# Patient Record
Sex: FEMALE | Race: OTHER | HISPANIC OR LATINO | URBAN - METROPOLITAN AREA
[De-identification: names, ages, dates, MRNs, and addresses within clinical notes are randomized per-mention and may not be internally consistent; named-entity substitution may affect disease eponyms.]

---

## 2020-03-11 ENCOUNTER — EMERGENCY (EMERGENCY)
Facility: HOSPITAL | Age: 50
LOS: 1 days | Discharge: HOME | End: 2020-03-11
Attending: EMERGENCY MEDICINE
Payer: SELF-PAY

## 2020-03-11 VITALS
HEART RATE: 81 BPM | DIASTOLIC BLOOD PRESSURE: 82 MMHG | HEIGHT: 66.54 IN | RESPIRATION RATE: 17 BRPM | TEMPERATURE: 98 F | SYSTOLIC BLOOD PRESSURE: 132 MMHG | OXYGEN SATURATION: 98 % | WEIGHT: 240.08 LBS

## 2020-03-11 LAB
APPEARANCE UR: CLEAR — SIGNIFICANT CHANGE UP
BACTERIA # UR AUTO: NEGATIVE — SIGNIFICANT CHANGE UP
BILIRUB UR-MCNC: NEGATIVE — SIGNIFICANT CHANGE UP
COLOR SPEC: YELLOW — SIGNIFICANT CHANGE UP
DIFF PNL FLD: ABNORMAL
EPI CELLS # UR: SIGNIFICANT CHANGE UP
GLUCOSE UR QL: 50 MG/DL
KETONES UR-MCNC: ABNORMAL
LEUKOCYTE ESTERASE UR-ACNC: NEGATIVE — SIGNIFICANT CHANGE UP
NITRITE UR-MCNC: NEGATIVE — SIGNIFICANT CHANGE UP
PH UR: 5 — SIGNIFICANT CHANGE UP (ref 5–8)
PROT UR-MCNC: 30 MG/DL
RBC CASTS # UR COMP ASSIST: ABNORMAL /HPF (ref 0–4)
SP GR SPEC: 1.03 — HIGH (ref 1.01–1.02)
UROBILINOGEN FLD QL: NEGATIVE MG/DL — SIGNIFICANT CHANGE UP
WBC UR QL: SIGNIFICANT CHANGE UP

## 2020-03-11 PROCEDURE — 99283 EMERGENCY DEPT VISIT LOW MDM: CPT | Mod: 25

## 2020-03-11 PROCEDURE — T1013: CPT

## 2020-03-11 PROCEDURE — 87086 URINE CULTURE/COLONY COUNT: CPT

## 2020-03-11 PROCEDURE — 71046 X-RAY EXAM CHEST 2 VIEWS: CPT

## 2020-03-11 PROCEDURE — 99284 EMERGENCY DEPT VISIT MOD MDM: CPT

## 2020-03-11 PROCEDURE — 81001 URINALYSIS AUTO W/SCOPE: CPT

## 2020-03-11 PROCEDURE — 71046 X-RAY EXAM CHEST 2 VIEWS: CPT | Mod: 26

## 2020-03-11 RX ORDER — METHOCARBAMOL 500 MG/1
1000 TABLET, FILM COATED ORAL ONCE
Refills: 0 | Status: COMPLETED | OUTPATIENT
Start: 2020-03-11 | End: 2020-03-11

## 2020-03-11 RX ORDER — AMOXICILLIN 250 MG/5ML
1 SUSPENSION, RECONSTITUTED, ORAL (ML) ORAL
Qty: 14 | Refills: 0
Start: 2020-03-11 | End: 2020-03-17

## 2020-03-11 RX ORDER — IBUPROFEN 200 MG
1 TABLET ORAL
Qty: 20 | Refills: 0
Start: 2020-03-11 | End: 2020-03-15

## 2020-03-11 RX ADMIN — METHOCARBAMOL 1000 MILLIGRAM(S): 500 TABLET, FILM COATED ORAL at 13:42

## 2020-03-11 NOTE — ED PROVIDER NOTE - ADDITIONAL NOTES AND INSTRUCTIONS:
PT was evaluated At [facility] ED and was found to have a condition that warranted time of to rest and heal from WORK/SCHOOL.   Juan Carlos Lemus PA-C

## 2020-03-11 NOTE — ED PROVIDER NOTE - CARE PROVIDER_API CALL
Hero Montoya)  Otolaryngology  [address] [place] [address], [place] [address]  Phone: [phone]  Fax: [phone]  Follow Up Time:

## 2020-03-11 NOTE — ED PROVIDER NOTE - ATTENDING CONTRIBUTION TO CARE
Danuta: I performed a face to face bedside interview with patient regarding history of present illness, review of symptoms and past medical history. I completed an independent physical exam.  I have discussed patient's plan of care with advanced care provider.   I agree with note as stated above including HISTORY OF PRESENT ILLNESS, HIV, PAST MEDICAL/SURGICAL/FAMILY/SOCIAL HISTORY, ALLERGIES AND HOME MEDICATIONS, REVIEW OF SYSTEMS, PHYSICAL EXAM, MEDICAL DECISION MAKING and any PROGRESS NOTES during the time I functioned as the attending physician for this patient  unless otherwise noted. My brief assessment is as follows: 50F h/o HTN, HLD p/w myalgias, fever. Debies travel, sick contacts, nausea, vomiting.   Gen: Well appearing in NAD  Head: NC/AT, R TM erythematous/bulging, no mastoid ttp.   Neck: trachea midline  Resp:  No distress, CTAB  Ext: no deformities  Neuro:  A&O appears non focal  Skin:  Warm and dry as visualized  Psych:  Normal affect and mood  UA and CXR WNL, will treat for otitis media.

## 2020-03-11 NOTE — ED ADULT TRIAGE NOTE - CHIEF COMPLAINT QUOTE
Patient reports 4-5 days of cough, fever and body aches. Pt states that she travels a lot on the train

## 2020-03-11 NOTE — ED PROVIDER NOTE - OBJECTIVE STATEMENT
PT with no SPMHx presents to the ED with complaint of body achess, fevers, SOB, x1wk. PT staets that she has been having mild non raditing symptioms over the last few PT with no SPMHx presents to the ED with complaint of body aches, fevers, x1wk. PT states that she has been having mild diffuse symptoms over the wk. PT states that symptoms started gradually have been consistent since onset, have not been made better or worse with anything. PT states that she has not been exposed to any sick contacts. PT dines cough, sob, diff breathing, weakness, numbness, tingling, rash, n/v.

## 2020-03-11 NOTE — ED PROVIDER NOTE - PATIENT PORTAL LINK FT
You can access the FollowMyHealth Patient Portal offered by [facility] by registering at the following website: [email or web address]. By joining [employer]’s FollowMyHealth portal, you will also be able to view your health information using other applications (apps) compatible with our system.

## 2020-03-12 LAB
CULTURE RESULTS: SIGNIFICANT CHANGE UP
SPECIMEN SOURCE: SIGNIFICANT CHANGE UP

## 2020-03-17 ENCOUNTER — EMERGENCY (EMERGENCY)
Facility: HOSPITAL | Age: 50
LOS: 1 days | Discharge: HOME | End: 2020-03-17
Attending: EMERGENCY MEDICINE
Payer: SELF-PAY

## 2020-03-17 VITALS
DIASTOLIC BLOOD PRESSURE: 81 MMHG | HEART RATE: 93 BPM | HEIGHT: 66.54 IN | OXYGEN SATURATION: 97 % | SYSTOLIC BLOOD PRESSURE: 125 MMHG | RESPIRATION RATE: 16 BRPM | TEMPERATURE: 98 F | WEIGHT: 227.08 LBS

## 2020-03-17 LAB
ALBUMIN SERPL ELPH-MCNC: 3.8 G/DL — SIGNIFICANT CHANGE UP (ref 3.3–5.2)
ALP SERPL-CCNC: 147 U/L — HIGH (ref 40–120)
ALT FLD-CCNC: 18 U/L — SIGNIFICANT CHANGE UP
ANION GAP SERPL CALC-SCNC: 11 MMOL/L — SIGNIFICANT CHANGE UP (ref 5–17)
AST SERPL-CCNC: 18 U/L — SIGNIFICANT CHANGE UP
BASOPHILS # BLD AUTO: 0.05 K/UL — SIGNIFICANT CHANGE UP (ref 0–0.2)
BASOPHILS NFR BLD AUTO: 0.5 % — SIGNIFICANT CHANGE UP (ref 0–2)
BILIRUB SERPL-MCNC: <0.2 MG/DL — LOW (ref 0.4–2)
BUN SERPL-MCNC: 20 MG/DL — SIGNIFICANT CHANGE UP (ref 8–20)
CALCIUM SERPL-MCNC: 8.9 MG/DL — SIGNIFICANT CHANGE UP (ref 8.6–10.2)
CHLORIDE SERPL-SCNC: 104 MMOL/L — SIGNIFICANT CHANGE UP (ref 98–107)
CO2 SERPL-SCNC: 24 MMOL/L — SIGNIFICANT CHANGE UP (ref 22–29)
CREAT SERPL-MCNC: 0.59 MG/DL — SIGNIFICANT CHANGE UP (ref 0.5–1.3)
EOSINOPHIL # BLD AUTO: 0.16 K/UL — SIGNIFICANT CHANGE UP (ref 0–0.5)
EOSINOPHIL NFR BLD AUTO: 1.5 % — SIGNIFICANT CHANGE UP (ref 0–6)
GLUCOSE SERPL-MCNC: 127 MG/DL — HIGH (ref 70–99)
HCG SERPL-ACNC: <4 MIU/ML — SIGNIFICANT CHANGE UP
HCT VFR BLD CALC: 40.6 % — SIGNIFICANT CHANGE UP (ref 34.5–45)
HGB BLD-MCNC: 12.9 G/DL — SIGNIFICANT CHANGE UP (ref 11.5–15.5)
IMM GRANULOCYTES NFR BLD AUTO: 0.5 % — SIGNIFICANT CHANGE UP (ref 0–1.5)
LYMPHOCYTES # BLD AUTO: 2.86 K/UL — SIGNIFICANT CHANGE UP (ref 1–3.3)
LYMPHOCYTES # BLD AUTO: 26 % — SIGNIFICANT CHANGE UP (ref 13–44)
MCHC RBC-ENTMCNC: 29.9 PG — SIGNIFICANT CHANGE UP (ref 27–34)
MCHC RBC-ENTMCNC: 31.8 GM/DL — LOW (ref 32–36)
MCV RBC AUTO: 94 FL — SIGNIFICANT CHANGE UP (ref 80–100)
MONOCYTES # BLD AUTO: 0.96 K/UL — HIGH (ref 0–0.9)
MONOCYTES NFR BLD AUTO: 8.7 % — SIGNIFICANT CHANGE UP (ref 2–14)
NEUTROPHILS # BLD AUTO: 6.91 K/UL — SIGNIFICANT CHANGE UP (ref 1.8–7.4)
NEUTROPHILS NFR BLD AUTO: 62.8 % — SIGNIFICANT CHANGE UP (ref 43–77)
PLATELET # BLD AUTO: 280 K/UL — SIGNIFICANT CHANGE UP (ref 150–400)
POTASSIUM SERPL-MCNC: 4.2 MMOL/L — SIGNIFICANT CHANGE UP (ref 3.5–5.3)
POTASSIUM SERPL-SCNC: 4.2 MMOL/L — SIGNIFICANT CHANGE UP (ref 3.5–5.3)
PROT SERPL-MCNC: 7.2 G/DL — SIGNIFICANT CHANGE UP (ref 6.6–8.7)
RAPID RVP RESULT: DETECTED
RBC # BLD: 4.32 M/UL — SIGNIFICANT CHANGE UP (ref 3.8–5.2)
RBC # FLD: 12.6 % — SIGNIFICANT CHANGE UP (ref 10.3–14.5)
RV+EV RNA SPEC QL NAA+PROBE: DETECTED
SODIUM SERPL-SCNC: 139 MMOL/L — SIGNIFICANT CHANGE UP (ref 135–145)
WBC # BLD: 10.99 K/UL — HIGH (ref 3.8–10.5)
WBC # FLD AUTO: 10.99 K/UL — HIGH (ref 3.8–10.5)

## 2020-03-17 PROCEDURE — 71045 X-RAY EXAM CHEST 1 VIEW: CPT | Mod: 26

## 2020-03-17 PROCEDURE — 87486 CHLMYD PNEUM DNA AMP PROBE: CPT

## 2020-03-17 PROCEDURE — 85027 COMPLETE CBC AUTOMATED: CPT

## 2020-03-17 PROCEDURE — 36415 COLL VENOUS BLD VENIPUNCTURE: CPT

## 2020-03-17 PROCEDURE — 87581 M.PNEUMON DNA AMP PROBE: CPT

## 2020-03-17 PROCEDURE — 94640 AIRWAY INHALATION TREATMENT: CPT

## 2020-03-17 PROCEDURE — 84702 CHORIONIC GONADOTROPIN TEST: CPT

## 2020-03-17 PROCEDURE — 71045 X-RAY EXAM CHEST 1 VIEW: CPT

## 2020-03-17 PROCEDURE — 99284 EMERGENCY DEPT VISIT MOD MDM: CPT | Mod: 25

## 2020-03-17 PROCEDURE — 96374 THER/PROPH/DIAG INJ IV PUSH: CPT

## 2020-03-17 PROCEDURE — 96375 TX/PRO/DX INJ NEW DRUG ADDON: CPT

## 2020-03-17 PROCEDURE — 80053 COMPREHEN METABOLIC PANEL: CPT

## 2020-03-17 PROCEDURE — 99284 EMERGENCY DEPT VISIT MOD MDM: CPT

## 2020-03-17 PROCEDURE — 87798 DETECT AGENT NOS DNA AMP: CPT

## 2020-03-17 PROCEDURE — 87633 RESP VIRUS 12-25 TARGETS: CPT

## 2020-03-17 RX ORDER — KETOROLAC TROMETHAMINE 30 MG/ML
15 SYRINGE (ML) INJECTION ONCE
Refills: 0 | Status: DISCONTINUED | OUTPATIENT
Start: 2020-03-17 | End: 2020-03-17

## 2020-03-17 RX ORDER — AZITHROMYCIN 500 MG/1
500 TABLET, FILM COATED ORAL ONCE
Refills: 0 | Status: COMPLETED | OUTPATIENT
Start: 2020-03-17 | End: 2020-03-17

## 2020-03-17 RX ORDER — ALBUTEROL 90 UG/1
2 AEROSOL, METERED ORAL ONCE
Refills: 0 | Status: COMPLETED | OUTPATIENT
Start: 2020-03-17 | End: 2020-03-17

## 2020-03-17 RX ORDER — CEFTRIAXONE 500 MG/1
1000 INJECTION, POWDER, FOR SOLUTION INTRAMUSCULAR; INTRAVENOUS ONCE
Refills: 0 | Status: COMPLETED | OUTPATIENT
Start: 2020-03-17 | End: 2020-03-17

## 2020-03-17 RX ORDER — SODIUM CHLORIDE 9 MG/ML
500 INJECTION INTRAMUSCULAR; INTRAVENOUS; SUBCUTANEOUS ONCE
Refills: 0 | Status: COMPLETED | OUTPATIENT
Start: 2020-03-17 | End: 2020-03-17

## 2020-03-17 RX ADMIN — Medication 15 MILLIGRAM(S): at 22:52

## 2020-03-17 RX ADMIN — ALBUTEROL 2 PUFF(S): 90 AEROSOL, METERED ORAL at 20:04

## 2020-03-17 RX ADMIN — AZITHROMYCIN 255 MILLIGRAM(S): 500 TABLET, FILM COATED ORAL at 20:52

## 2020-03-17 RX ADMIN — CEFTRIAXONE 100 MILLIGRAM(S): 500 INJECTION, POWDER, FOR SOLUTION INTRAMUSCULAR; INTRAVENOUS at 20:04

## 2020-03-17 RX ADMIN — SODIUM CHLORIDE 500 MILLILITER(S): 9 INJECTION INTRAMUSCULAR; INTRAVENOUS; SUBCUTANEOUS at 20:04

## 2020-03-17 NOTE — ED PROVIDER NOTE - PATIENT PORTAL LINK FT
You can access the FollowMyHealth Patient Portal offered by [facility] by registering at the following website: [email or web address]. By joining [employer]’s FollowMyHealth portal, you will also be able to view your health information using other applications (apps) compatible with our system.

## 2020-03-17 NOTE — ED PROVIDER NOTE - NSFOLLOWUPINSTRUCTIONS_ED_ALL_ED_FT
POR FAVOR SAMUEL WERO ALEXA CON CHOWDARY MEDICO PRIMARIO. PUEDE UTILIZAR 2 ROCIOS DE ALBUTEROL CADA 4 HORAS PARA LA TOZ O FALTA DE AIRE. SI SE SIENTE PEOR, REGRESE A LA KATE DE EMERGENCIAS EN SEGUIDA.  TRATE DE EVITAR LUGARES PUBLICOS Y OTRAS PERSONAS HASTA QUE SE SIENTE MEJOR

## 2020-03-17 NOTE — ED PROVIDER NOTE - OBJECTIVE STATEMENT
50yoF with HTN, HLD, c/o fever, cough, body aches since last Tuesday; reports productive cough; now c/o some SOB even at rest.  Reports she completed a course of antibiotics given to her on last ED visit but she feels worse now.  C/o L flank area pain.  Denies travel but reports she commutes on the train to [place] daily.  vaccinated via flu. Took ibuprofen last time this morning.  Finished course of antibiotics tomorrow.

## 2020-03-17 NOTE — ED ADULT NURSE NOTE - OBJECTIVE STATEMENT
Assumed pt care at this time. Pt resting comfortably in stretcher, A&Ox3, NAD noted, respirations even and nonlabored. Pt c/o of SOB and cough. Pt is r/o covid-19.

## 2020-03-17 NOTE — ED ADULT TRIAGE NOTE - CHIEF COMPLAINT QUOTE
50, F, nad, sent by [facility] for worsening PNA, sob and fever. Pt reports antibiotics being taken and making her worse. Per charge RN pt to wait in subwaiting A for MAIN [facility] bed to be free.

## 2020-03-17 NOTE — ED ADULT NURSE NOTE - CHIEF COMPLAINT QUOTE
50, F, nad, sent by [facility] for worsening PNA, sob and fever. Pt reports antibiotics being taken and making her worse. Per charge RN pt to wait in subwaiting A for MAIN [facility] bed to be free.

## 2020-03-17 NOTE — ED PROVIDER NOTE - PROGRESS NOTE DETAILS
Dyspnea improved. Pt pending RVP results/ Dyspnea improved after bronchodilator. Pt pending RVP results but well enough to be discharged so patient will leave prior to results. Instructed to exercise supportive care/ social isolation/ self quarantine, and return if worse.

## 2020-03-18 PROBLEM — I10 ESSENTIAL (PRIMARY) HYPERTENSION: Chronic | Status: ACTIVE | Noted: 2020-03-11

## 2020-03-18 PROBLEM — E78.5 HYPERLIPIDEMIA, UNSPECIFIED: Chronic | Status: ACTIVE | Noted: 2020-03-11

## 2020-06-11 NOTE — ED PROVIDER NOTE - RESPIRATORY DISTRESS
no Left UE Active Assistive ROM was WFL  (within functional limits)/Right UE Active ROM was WFL (within functional limits)

## 2020-06-25 ENCOUNTER — INPATIENT (INPATIENT)
Facility: HOSPITAL | Age: 50
LOS: 0 days | Discharge: HOME | DRG: 93 | End: 2020-06-26
Attending: HOSPITALIST | Admitting: HOSPITALIST
Payer: MEDICAID

## 2020-06-25 VITALS
TEMPERATURE: 98 F | SYSTOLIC BLOOD PRESSURE: 197 MMHG | HEIGHT: 63 IN | HEART RATE: 98 BPM | WEIGHT: 199.96 LBS | RESPIRATION RATE: 22 BRPM | OXYGEN SATURATION: 97 % | DIASTOLIC BLOOD PRESSURE: 110 MMHG

## 2020-06-25 DIAGNOSIS — I63.9 CEREBRAL INFARCTION, UNSPECIFIED: ICD-10-CM

## 2020-06-25 LAB
A1C WITH ESTIMATED AVERAGE GLUCOSE RESULT: 7.7 % — HIGH (ref 4–5.6)
ALBUMIN SERPL ELPH-MCNC: 3.7 G/DL — SIGNIFICANT CHANGE UP (ref 3.3–5.2)
ALP SERPL-CCNC: 146 U/L — HIGH (ref 40–120)
ALT FLD-CCNC: 22 U/L — SIGNIFICANT CHANGE UP
ANION GAP SERPL CALC-SCNC: 11 MMOL/L — SIGNIFICANT CHANGE UP (ref 5–17)
AST SERPL-CCNC: 15 U/L — SIGNIFICANT CHANGE UP
BILIRUB SERPL-MCNC: <0.2 MG/DL — LOW (ref 0.4–2)
BUN SERPL-MCNC: 14 MG/DL — SIGNIFICANT CHANGE UP (ref 8–20)
CALCIUM SERPL-MCNC: 8.9 MG/DL — SIGNIFICANT CHANGE UP (ref 8.6–10.2)
CHLORIDE SERPL-SCNC: 103 MMOL/L — SIGNIFICANT CHANGE UP (ref 98–107)
CO2 SERPL-SCNC: 26 MMOL/L — SIGNIFICANT CHANGE UP (ref 22–29)
CREAT SERPL-MCNC: 0.48 MG/DL — LOW (ref 0.5–1.3)
D DIMER BLD IA.RAPID-MCNC: <150 NG/ML DDU — SIGNIFICANT CHANGE UP
ESTIMATED AVERAGE GLUCOSE: 174 MG/DL — HIGH (ref 68–114)
GLUCOSE BLDC GLUCOMTR-MCNC: 121 MG/DL — HIGH (ref 70–99)
GLUCOSE BLDC GLUCOMTR-MCNC: 201 MG/DL — HIGH (ref 70–99)
GLUCOSE SERPL-MCNC: 212 MG/DL — HIGH (ref 70–99)
HCT VFR BLD CALC: 39.5 % — SIGNIFICANT CHANGE UP (ref 34.5–45)
HGB BLD-MCNC: 12.6 G/DL — SIGNIFICANT CHANGE UP (ref 11.5–15.5)
MCHC RBC-ENTMCNC: 29.3 PG — SIGNIFICANT CHANGE UP (ref 27–34)
MCHC RBC-ENTMCNC: 31.9 GM/DL — LOW (ref 32–36)
MCV RBC AUTO: 91.9 FL — SIGNIFICANT CHANGE UP (ref 80–100)
NT-PROBNP SERPL-SCNC: 30 PG/ML — SIGNIFICANT CHANGE UP (ref 0–300)
PLATELET # BLD AUTO: 264 K/UL — SIGNIFICANT CHANGE UP (ref 150–400)
POTASSIUM SERPL-MCNC: 4 MMOL/L — SIGNIFICANT CHANGE UP (ref 3.5–5.3)
POTASSIUM SERPL-SCNC: 4 MMOL/L — SIGNIFICANT CHANGE UP (ref 3.5–5.3)
PROT SERPL-MCNC: 6.8 G/DL — SIGNIFICANT CHANGE UP (ref 6.6–8.7)
RBC # BLD: 4.3 M/UL — SIGNIFICANT CHANGE UP (ref 3.8–5.2)
RBC # FLD: 12.6 % — SIGNIFICANT CHANGE UP (ref 10.3–14.5)
SODIUM SERPL-SCNC: 140 MMOL/L — SIGNIFICANT CHANGE UP (ref 135–145)
TROPONIN T SERPL-MCNC: <0.01 NG/ML — SIGNIFICANT CHANGE UP (ref 0–0.06)
TROPONIN T SERPL-MCNC: <0.01 NG/ML — SIGNIFICANT CHANGE UP (ref 0–0.06)
TSH SERPL-MCNC: 0.85 UIU/ML — SIGNIFICANT CHANGE UP (ref 0.27–4.2)
WBC # BLD: 8.91 K/UL — SIGNIFICANT CHANGE UP (ref 3.8–10.5)
WBC # FLD AUTO: 8.91 K/UL — SIGNIFICANT CHANGE UP (ref 3.8–10.5)

## 2020-06-25 PROCEDURE — 70498 CT ANGIOGRAPHY NECK: CPT | Mod: 26

## 2020-06-25 PROCEDURE — 93010 ELECTROCARDIOGRAM REPORT: CPT

## 2020-06-25 PROCEDURE — 70450 CT HEAD/BRAIN W/O DYE: CPT | Mod: 26,59

## 2020-06-25 PROCEDURE — 99285 EMERGENCY DEPT VISIT HI MDM: CPT

## 2020-06-25 PROCEDURE — 70496 CT ANGIOGRAPHY HEAD: CPT | Mod: 26

## 2020-06-25 PROCEDURE — 71045 X-RAY EXAM CHEST 1 VIEW: CPT | Mod: 26

## 2020-06-25 PROCEDURE — 99223 1ST HOSP IP/OBS HIGH 75: CPT

## 2020-06-25 PROCEDURE — 72125 CT NECK SPINE W/O DYE: CPT | Mod: 26

## 2020-06-25 RX ORDER — HEPARIN SODIUM 5000 [USP'U]/ML
5000 INJECTION INTRAVENOUS; SUBCUTANEOUS EVERY 8 HOURS
Refills: 0 | Status: DISCONTINUED | OUTPATIENT
Start: 2020-06-25 | End: 2020-06-26

## 2020-06-25 RX ORDER — ASPIRIN/CALCIUM CARB/MAGNESIUM 324 MG
325 TABLET ORAL DAILY
Refills: 0 | Status: DISCONTINUED | OUTPATIENT
Start: 2020-06-25 | End: 2020-06-26

## 2020-06-25 RX ORDER — DEXTROSE 50 % IN WATER 50 %
25 SYRINGE (ML) INTRAVENOUS ONCE
Refills: 0 | Status: DISCONTINUED | OUTPATIENT
Start: 2020-06-25 | End: 2020-06-26

## 2020-06-25 RX ORDER — VENLAFAXINE HCL 75 MG
75 CAPSULE, EXT RELEASE 24 HR ORAL DAILY
Refills: 0 | Status: DISCONTINUED | OUTPATIENT
Start: 2020-06-25 | End: 2020-06-26

## 2020-06-25 RX ORDER — DEXTROSE 50 % IN WATER 50 %
15 SYRINGE (ML) INTRAVENOUS ONCE
Refills: 0 | Status: DISCONTINUED | OUTPATIENT
Start: 2020-06-25 | End: 2020-06-26

## 2020-06-25 RX ORDER — DEXTROSE 50 % IN WATER 50 %
12.5 SYRINGE (ML) INTRAVENOUS ONCE
Refills: 0 | Status: DISCONTINUED | OUTPATIENT
Start: 2020-06-25 | End: 2020-06-26

## 2020-06-25 RX ORDER — SODIUM CHLORIDE 9 MG/ML
1000 INJECTION, SOLUTION INTRAVENOUS
Refills: 0 | Status: DISCONTINUED | OUTPATIENT
Start: 2020-06-25 | End: 2020-06-26

## 2020-06-25 RX ORDER — GLUCAGON INJECTION, SOLUTION 0.5 MG/.1ML
1 INJECTION, SOLUTION SUBCUTANEOUS ONCE
Refills: 0 | Status: DISCONTINUED | OUTPATIENT
Start: 2020-06-25 | End: 2020-06-26

## 2020-06-25 RX ORDER — INSULIN LISPRO 100/ML
VIAL (ML) SUBCUTANEOUS
Refills: 0 | Status: DISCONTINUED | OUTPATIENT
Start: 2020-06-25 | End: 2020-06-26

## 2020-06-25 RX ORDER — NITROGLYCERIN 6.5 MG
0.4 CAPSULE, EXTENDED RELEASE ORAL
Refills: 0 | Status: DISCONTINUED | OUTPATIENT
Start: 2020-06-25 | End: 2020-06-26

## 2020-06-25 RX ORDER — ASPIRIN/CALCIUM CARB/MAGNESIUM 324 MG
162 TABLET ORAL DAILY
Refills: 0 | Status: DISCONTINUED | OUTPATIENT
Start: 2020-06-25 | End: 2020-06-25

## 2020-06-25 RX ORDER — FAMOTIDINE 10 MG/ML
20 INJECTION INTRAVENOUS
Refills: 0 | Status: DISCONTINUED | OUTPATIENT
Start: 2020-06-25 | End: 2020-06-26

## 2020-06-25 RX ORDER — MORPHINE SULFATE 50 MG/1
2 CAPSULE, EXTENDED RELEASE ORAL EVERY 4 HOURS
Refills: 0 | Status: DISCONTINUED | OUTPATIENT
Start: 2020-06-25 | End: 2020-06-25

## 2020-06-25 RX ORDER — ATORVASTATIN CALCIUM 80 MG/1
20 TABLET, FILM COATED ORAL AT BEDTIME
Refills: 0 | Status: DISCONTINUED | OUTPATIENT
Start: 2020-06-25 | End: 2020-06-26

## 2020-06-25 RX ORDER — MORPHINE SULFATE 50 MG/1
4 CAPSULE, EXTENDED RELEASE ORAL ONCE
Refills: 0 | Status: DISCONTINUED | OUTPATIENT
Start: 2020-06-25 | End: 2020-06-25

## 2020-06-25 RX ADMIN — ATORVASTATIN CALCIUM 20 MILLIGRAM(S): 80 TABLET, FILM COATED ORAL at 21:56

## 2020-06-25 RX ADMIN — Medication 162 MILLIGRAM(S): at 11:25

## 2020-06-25 RX ADMIN — HEPARIN SODIUM 5000 UNIT(S): 5000 INJECTION INTRAVENOUS; SUBCUTANEOUS at 21:56

## 2020-06-25 NOTE — PHYSICAL THERAPY INITIAL EVALUATION ADULT - DISCHARGE DISPOSITION, PT EVAL
home w/ home PT/home w/ assist/home with assist, RW and home PT pending progress and pending continued stair training

## 2020-06-25 NOTE — PHYSICAL THERAPY INITIAL EVALUATION ADULT - PHYSICAL ASSIST/NONPHYSICAL ASSIST: STAIR NEGOTIATION, REHAB EVAL
nonverbal cues (demo/gestures)/1 person assist/verbal cues/pt required increased physical assistance to help maintain proper upright walking posture and for safety + falls prevention during stair negotiation; verbal cues for proper stair sequence; provided demonstration re proper use of RW during stair negotiation with repeat demonstration provided by pt

## 2020-06-25 NOTE — PHYSICAL THERAPY INITIAL EVALUATION ADULT - PHYSICAL ASSIST/NONPHYSICAL ASSIST: STAND/SIT, REHAB EVAL
pt required increased physical assistance to help perform transfer/to safely lower to a sitting position; verbal cues re proper sequence + proper hand placement in prep to perform transfer/verbal cues/1 person assist

## 2020-06-25 NOTE — ED ADULT TRIAGE NOTE - CHIEF COMPLAINT QUOTE
pt came to ED c/o left side chest heaviness and SOB that began this AM. Pt states she also has had left arm tingling x 2 days. Pt denies any cardiac history. Ambulatory in ED. No other symptoms noted. Pt tearful in triage

## 2020-06-25 NOTE — ED PROVIDER NOTE - OBJECTIVE STATEMENT
51 yo female pmh htn, hld comes to ed wit 3 days history of numbness and pain of left upper extremity; pt reports chest pain this morning; pt denies fever, chills, exposure to sick contacts;

## 2020-06-25 NOTE — PHYSICAL THERAPY INITIAL EVALUATION ADULT - GENERAL OBSERVATIONS, REHAB EVAL
Pt received in [facility], [facility], WOLFGANG chavez'ed pt for PT. pt [place]ian speaking, treating PT spoke Mal[place]ian. Pt observed semi-ontiveros in bed with telemontior with , darcy hemphill, A&O and c/o 8/10 headache

## 2020-06-25 NOTE — H&P ADULT - NEUROLOGICAL DETAILS
responds to verbal commands/alert and oriented x 3/responds to pain/focal deficit/strength decreased

## 2020-06-25 NOTE — CONSULT NOTE ADULT - SUBJECTIVE AND OBJECTIVE BOX
CHIEF COMPLAINT: chest pressure    HPI: 50yFemale    · Chief Complaint: The patient is a 50y Female complaining of chest pressure.	  · HPI Objective Statement: 51 yo female pmh htn, hld comes to ed wit 3 days history of numbness and pain of left upper extremity; pt reports chest pain this morning; pt denies fever, chills, exposure to sick contacts;	  · Presenting Symptoms: DIZZINESS	      PAST MEDICAL & SURGICAL HISTORY:  HLD (hyperlipidemia)  HTN (hypertension)  No significant past surgical history    MEDICATIONS  (STANDING):  aspirin enteric coated 325 milliGRAM(s) Oral daily  atorvastatin 20 milliGRAM(s) Oral at bedtime  dextrose 5%. 1000 milliLiter(s) (50 mL/Hr) IV Continuous <Continuous>  dextrose 50% Injectable 12.5 Gram(s) IV Push once  dextrose 50% Injectable 25 Gram(s) IV Push once  dextrose 50% Injectable 25 Gram(s) IV Push once  famotidine    Tablet 20 milliGRAM(s) Oral two times a day  heparin   Injectable 5000 Unit(s) SubCutaneous every 8 hours  insulin lispro (HumaLOG) corrective regimen sliding scale   SubCutaneous three times a day before meals  venlafaxine 75 milliGRAM(s) Oral daily    MEDICATIONS  (PRN):  dextrose 40% Gel 15 Gram(s) Oral once PRN Blood Glucose LESS THAN 70 milliGRAM(s)/deciliter  glucagon  Injectable 1 milliGRAM(s) IntraMuscular once PRN Glucose LESS THAN 70 milligrams/deciliter  nitroglycerin     SubLingual 0.4 milliGRAM(s) SubLingual every 5 minutes PRN Chest Pain    Allergies    No Known Allergies    Intolerances        FAMILY HISTORY:          SOCIAL HISTORY:    Tobacco:  no  Alcohol:  no  Drugs:  no        REVIEW OF SYSTEMS:    Relevant systems are negative except as noted in the chart, HPI, and PMH      VITAL SIGNS:  Vital Signs Last 24 Hrs  T(C): 36.1 (25 Jun 2020 10:51), Max: 36.8 (25 Jun 2020 09:19)  T(F): 96.9 (25 Jun 2020 10:51), Max: 98.3 (25 Jun 2020 09:19)  HR: 74 (25 Jun 2020 10:51) (74 - 98)  BP: 132/93 (25 Jun 2020 10:51) (132/93 - 197/110)  BP(mean): --  RR: 20 (25 Jun 2020 10:51) (20 - 22)  SpO2: 94% (25 Jun 2020 10:51) (94% - 97%)    PHYSICAL EXAMINATION:    General: Well-developed, well nourished, in no acute distress.  Cardiac:  Regular rate and rhythm. No carotid bruits appreciated.  Eyes: Fundoscopic examination was deferred.  Neurologic:  - Mental Status:  Alert, awake, oriented to person, place, and time; Speech is fluent. Language is normal. Follows commands well.  Insight and knowledge appear appropriate.  Cranial Nerves II-XII:    II:  Visual acuity is normal for age ; Visual fields are full to confrontation; Pupils are equal, round, and reactive to light.  III, IV, VI:  Extraocular movements are intact without nystagmus.  V:  Facial sensation is intact in the V1-V3 distribution bilaterally.  VII:  Face is symmetric with normal eye closure and smile  VIII:  Hearing is grossly intact  IX, X, XII:  speech is clear  XI:  Head turning and shoulder shrug are intact.  - Motor:  Strength is 5/5 x 4.   There is no pronator drift. .  - Reflexes:  2+ and symmetric at the knees.  Plantar responses flexor.  - Sensory:  Symmetric to light touch  - Coordination:  Finger-nose-finger is normal. Rapid alternating hand and foot  movements are intact. Dexterity appears normal      LABS:                          12.6   8.91  )-----------( 264      ( 25 Jun 2020 10:58 )             39.5     25 Jun 2020 10:58    140    |  103    |  14.0   ----------------------------<  212    4.0     |  26.0   |  0.48     Ca    8.9        25 Jun 2020 10:58    TPro  6.8    /  Alb  3.7    /  TBili  <0.2   /  DBili  x      /  AST  15     /  ALT  22     /  AlkPhos  146    25 Jun 2020 10:58    LIVER FUNCTIONS - ( 25 Jun 2020 10:58 )  Alb: 3.7 g/dL / Pro: 6.8 g/dL / ALK PHOS: 146 U/L / ALT: 22 U/L / AST: 15 U/L / GGT: x                 RADIOLOGY & ADDITIONAL STUDIES:    < from: CT Head No Cont (06.25.20 @ 13:19) >  CT head:     Age-indeterminate right cerebellar lacunar infarct (4:7). There is no acute intracranial hemorrhage or mass effect. The ventricles and sulci are normal in size for patient's age.     There is no extraaxial fluid collection.     There is no displaced calvarial fracture. The visualized orbits are within normal limits. Left sphenoid sinus mucosal thickening. Right mastoid effusion.      CT cervical spine:    Reversal of the cervical lordosis. Vertebral heights are within normal limits. Intervertebral disc spaces are preserved. Disc osteophyte complexes at C5-C6 and C6-C7.    Vertebral body heights and alignment are maintained. The intervertebral disc spaces are preserved. There is no significant spinal canal or neural foraminal narrowing.     Prominent nasopharyngeal soft tissues and bilateral palatine tonsils. The paraspinal soft tissues are unremarkable. The lung apices are clear.       IMPRESSION:     CT head:   1.  No acute intracranial hemorrhage.  2.  Age-indeterminate right cerebellar lacunar infarct.    CT cervical spine:   1.  No evidence for acute displaced fracture or malalignment.   2.  Prominent nasopharyngeal soft tissues and bilateral palatine tonsils. Correlate with direct visualization to exclude underlying lesion.    < end of copied text >      < from: CT Angio Head w/ IV Cont (06.25.20 @ 13:19) >  CT angiography neck:   1.  No hemodynamically significant stenosis of the bilateral cervical ICAs using NASCET criteria.  Patent vertebral arteries.  No evidence of vascular dissection.  2.  Prominent nasopharyngeal soft tissues and bilateral palatine tonsils.Recommend direct visualization to exclude an underlying lesion.    CT angiography brain:   1.  No large vessel occlusion.  2.  3 x 2 x 2 mm outpouching from the left supraclinoid ICA directed inferiorly suggesting an aneurysm (8:53).      < end of copied text >      IMPRESSION:  Atypical symptoms of left arm and chest pain/pressure, dizziness  with nonfocal exam as per ED  Ct shows questionable cerebellar infarct vs artifact.   CTA suggests incidental aneurysm. No significant stenosis    PLAN:  1. MRI brain and C spine  2. MRA brain. Consider neurosurgical opinion on the incidental finding   3. Cerebrovascular risk factor assessment and management  4. Medical and Cardiac evaluation and treatment as indicated  5. Antiplatelet therapy as prescribed.  4. Will see patient in the AM personally. COVID pending??  5.

## 2020-06-25 NOTE — PHYSICAL THERAPY INITIAL EVALUATION ADULT - RANGE OF MOTION EXAMINATION, REHAB EVAL
Left UE ROM was WFL (within functional limits)/Left LE ROM was WFL (within functional limits)/Right UE ROM was WNL (within normal limits)/Right LE ROM was WNL (within normal limits)

## 2020-06-25 NOTE — H&P ADULT - RS GEN PE MLT RESP DETAILS PC
airway patent/good air movement/breath sounds equal/no rales/no chest wall tenderness/respirations non-labored/no rhonchi/clear to auscultation bilaterally

## 2020-06-25 NOTE — H&P ADULT - NSICDXFAMILYHX_GEN_ALL_CORE_FT
FAMILY HISTORY:  Family history of coronary artery disease, father  at age 64  Family history of hypertension in mother, she is alive 83 yo

## 2020-06-25 NOTE — PHYSICAL THERAPY INITIAL EVALUATION ADULT - CRITERIA FOR SKILLED THERAPEUTIC INTERVENTIONS
rehab potential/anticipated equipment needs at discharge/predicted duration of therapy intervention/impairments found/anticipated discharge recommendation/functional limitations in following categories/therapy frequency/risk reduction/prevention

## 2020-06-25 NOTE — PHYSICAL THERAPY INITIAL EVALUATION ADULT - PHYSICAL ASSIST/NONPHYSICAL ASSIST: SIT/STAND, REHAB EVAL
verbal cues/pt required increased physical assistance to help perform transfer/to rise to a full standing position. verbal cues re proper sequence + proper hand placement in prep to perform transfer/1 person assist

## 2020-06-25 NOTE — H&P ADULT - HISTORY OF PRESENT ILLNESS
49 y/o F pt with PMHx depression presents to the ED c/o chest pressure beginning this morning, She was interviewed with   Casi . Pt states  that 3 days ago she started  to have tingling pressure pain in the left hand then went up to his shoulder and felt weak and pain in the left side of chest . Pt's also had reported some dyspnea . She felt off balance  and confused this am on ambulation . she still having left arm weakness as well as left leg and chest discomfort at present . On arrival BP is high she denies any h/o HTN or taking medication + dizziness.  Pt has never had similar symptoms.  No cardiac hx, no hx PE, no hx blood clots.  Smoker.

## 2020-06-25 NOTE — PHYSICAL THERAPY INITIAL EVALUATION ADULT - ADDITIONAL COMMENTS
Pt lives in a 1 story private home with spouse, daughter and three grandchildren with 1 AVERY no handrails, bed&bath on ground level and no stairs inside. Pt's PLOF was independent in all ADL's + ambulation without an Assistive Device. Pt has no DME at home. At this time, RW and tub transfer bench &/or shower chair recommended upon d/c

## 2020-06-25 NOTE — H&P ADULT - NSHPLABSRESULTS_GEN_ALL_CORE
12.6   8.91  )-----------( 264      ( 25 Jun 2020 10:58 )             39.5   06-25    140  |  103  |  14.0  ----------------------------<  212<H>  4.0   |  26.0  |  0.48<L>    Ca    8.9      25 Jun 2020 10:58    TPro  6.8  /  Alb  3.7  /  TBili  <0.2<L>  /  DBili  x   /  AST  15  /  ALT  22  /  AlkPhos  146<H>  06-25  < from: CT Angio Head w/ IV Cont (06.25.20 @ 13:19) >    IMPRESSION:     CT angiography neck:   1.  No hemodynamically significant stenosis of the bilateral cervical ICAs using NASCET criteria.  Patent vertebral arteries.  No evidence of vascular dissection.  2.  Prominent nasopharyngeal soft tissues and bilateral palatine tonsils.Recommend direct visualization to exclude an underlying lesion.    CT angiography brain:   1.  No large vessel occlusion.  2.  3 x 2 x 2 mm outpouching from the left supraclinoid ICA directed inferiorly suggesting an aneurysm (8:53).          < end of copied text >

## 2020-06-25 NOTE — ED STATDOCS - PROGRESS NOTE DETAILS
49 y/o F pt with PMHx depression presents to the ED c/o chest pressure beginning this morning.  Pt reports chest pressure, currently rated 5/10 in severity.  She also states this morning she began having tingling and pain in her LUE, beginning in her L hand and gradually traveling up her arm, states she feels like she is unable to raise her LUE.  She also reports dizziness.  Pt has never had similar symptoms.  No cardiac hx, no hx PE, no hx blood clots.  Smoker.  Exam: FROM of LUE, strength and sensation intact, (+) focal costochondral tenderness  EKG shows NSR, HR 93, no ST changes.  Pt will be sent to the Main ED for further treatment and evaluation. Initial orders placed.

## 2020-06-25 NOTE — PHYSICAL THERAPY INITIAL EVALUATION ADULT - PHYSICAL ASSIST/NONPHYSICAL ASSIST: SIT/SUPINE, REHAB EVAL
verbal cues/pt required supervision for safety + falls prevention; verbal cues for proper sequence + proper use of bed rails/supervision

## 2020-06-25 NOTE — PHYSICAL THERAPY INITIAL EVALUATION ADULT - PERTINENT HX OF CURRENT PROBLEM, REHAB EVAL
BIBA with c/o left sided chest pressure/heaviness, SOB and arm tingling x multiple days; CT head (+) age indetermine right cerebellar lacunar infarct

## 2020-06-25 NOTE — ED PROVIDER NOTE - PATIENT/CAREGIVER ACCEPTED INTERPRETER SERVICES
Cued into patient's room.  Permission received per patient to turn camera to view patient.  Introduced as VN for night shift that will be working with floor nurse and nursing assistant.  Hanane ROCA and Becca WALLER at bedside.  Educated patient on VN's role in patient care. Plan of care reviewed with patient. Education per flowsheet.  Opportunity given for questions and questions answered.  Instructed to call for assistance.  Will cont to monitor.         yes

## 2020-06-25 NOTE — ED ADULT NURSE NOTE - OBJECTIVE STATEMENT
Pt with hx of Vertigo, HTN (did NOT take htn meds today) arrives with c/o 3 days LUE numbness from fingers progressively moving towards shoulder. This morning pt reports sudden onset left chest "pressure" with dizziness when moving head. Pt able to maex4 with equal strength and purpose.

## 2020-06-25 NOTE — H&P ADULT - NSHPPHYSICALEXAM_GEN_ALL_CORE
Vital Signs Last 24 Hrs  T(C): 36.1 (25 Jun 2020 10:51), Max: 36.8 (25 Jun 2020 09:19)  T(F): 96.9 (25 Jun 2020 10:51), Max: 98.3 (25 Jun 2020 09:19)  HR: 74 (25 Jun 2020 10:51) (74 - 98)  BP: 132/93 (25 Jun 2020 10:51) (132/93 - 197/110)  BP(mean): --  RR: 20 (25 Jun 2020 10:51) (20 - 22)  SpO2: 94% (25 Jun 2020 10:51) (94% - 97%)

## 2020-06-25 NOTE — PHYSICAL THERAPY INITIAL EVALUATION ADULT - ASSISTIVE DEVICE FOR STAIR TRANSFER, REHAB EVAL
1st attempt used 1 handrail and contralateral HHA provided by treating PT and 2nd attempt used RW/rolling walker/left rail up/right rail down

## 2020-06-25 NOTE — H&P ADULT - ASSESSMENT
49 yo obese F with h/o NICOLE on CIPAP, depression [presented to the ED with c/o left arm/ leg  weakness , Chest pain and SOB , in the ED BP is high on arrival       1- Left sided weakness arm and leg   suspected TIA / vs CVA   CT of head result noted , ? cerebellar infarct   aspirin and statin started   stroke protocol   neurology consulted   TTE , PT ambulate     2- Chest pain ? SOB   serial cardiac enzymes troponin ordered   TTE , if echo abnormal  troponin positive will call cardiology to evaluate   NTG if needed for pain   cont  aspirin     3- Elevated BP - no h/o HTN   will cont to monitor treat if indicated     4- NICOLE on CIPAP   pt can use own     5- Depression on effexor will continue

## 2020-06-26 ENCOUNTER — TRANSCRIPTION ENCOUNTER (OUTPATIENT)
Age: 50
End: 2020-06-26

## 2020-06-26 VITALS
RESPIRATION RATE: 18 BRPM | HEART RATE: 84 BPM | DIASTOLIC BLOOD PRESSURE: 82 MMHG | TEMPERATURE: 98 F | SYSTOLIC BLOOD PRESSURE: 135 MMHG | OXYGEN SATURATION: 96 %

## 2020-06-26 LAB
A1C WITH ESTIMATED AVERAGE GLUCOSE RESULT: 7.8 % — HIGH (ref 4–5.6)
CHOLEST SERPL-MCNC: 194 MG/DL — SIGNIFICANT CHANGE UP (ref 110–199)
ESTIMATED AVERAGE GLUCOSE: 177 MG/DL — HIGH (ref 68–114)
GLUCOSE BLDC GLUCOMTR-MCNC: 166 MG/DL — HIGH (ref 70–99)
GLUCOSE BLDC GLUCOMTR-MCNC: 185 MG/DL — HIGH (ref 70–99)
GLUCOSE BLDC GLUCOMTR-MCNC: 244 MG/DL — HIGH (ref 70–99)
HDLC SERPL-MCNC: 31 MG/DL — LOW
LIPID PNL WITH DIRECT LDL SERPL: SIGNIFICANT CHANGE UP MG/DL
SARS-COV-2 IGG SERPL QL IA: NEGATIVE — SIGNIFICANT CHANGE UP
SARS-COV-2 IGM SERPL IA-ACNC: <0.1 INDEX — SIGNIFICANT CHANGE UP
SARS-COV-2 RNA SPEC QL NAA+PROBE: SIGNIFICANT CHANGE UP
TOTAL CHOLESTEROL/HDL RATIO MEASUREMENT: 6 RATIO — SIGNIFICANT CHANGE UP (ref 3.3–7.1)
TRIGL SERPL-MCNC: 450 MG/DL — HIGH (ref 10–200)
TROPONIN T SERPL-MCNC: <0.01 NG/ML — SIGNIFICANT CHANGE UP (ref 0–0.06)

## 2020-06-26 PROCEDURE — 70450 CT HEAD/BRAIN W/O DYE: CPT

## 2020-06-26 PROCEDURE — 80061 LIPID PANEL: CPT

## 2020-06-26 PROCEDURE — T1013: CPT

## 2020-06-26 PROCEDURE — 70544 MR ANGIOGRAPHY HEAD W/O DYE: CPT | Mod: 26,59

## 2020-06-26 PROCEDURE — 84443 ASSAY THYROID STIM HORMONE: CPT

## 2020-06-26 PROCEDURE — 93306 TTE W/DOPPLER COMPLETE: CPT | Mod: 26

## 2020-06-26 PROCEDURE — 99285 EMERGENCY DEPT VISIT HI MDM: CPT

## 2020-06-26 PROCEDURE — C8929: CPT

## 2020-06-26 PROCEDURE — 99239 HOSP IP/OBS DSCHRG MGMT >30: CPT

## 2020-06-26 PROCEDURE — 36415 COLL VENOUS BLD VENIPUNCTURE: CPT

## 2020-06-26 PROCEDURE — 83036 HEMOGLOBIN GLYCOSYLATED A1C: CPT

## 2020-06-26 PROCEDURE — U0003: CPT

## 2020-06-26 PROCEDURE — 85379 FIBRIN DEGRADATION QUANT: CPT

## 2020-06-26 PROCEDURE — 82962 GLUCOSE BLOOD TEST: CPT

## 2020-06-26 PROCEDURE — 86769 SARS-COV-2 COVID-19 ANTIBODY: CPT

## 2020-06-26 PROCEDURE — 70496 CT ANGIOGRAPHY HEAD: CPT

## 2020-06-26 PROCEDURE — 70498 CT ANGIOGRAPHY NECK: CPT

## 2020-06-26 PROCEDURE — 72125 CT NECK SPINE W/O DYE: CPT

## 2020-06-26 PROCEDURE — 85027 COMPLETE CBC AUTOMATED: CPT

## 2020-06-26 PROCEDURE — 70544 MR ANGIOGRAPHY HEAD W/O DYE: CPT

## 2020-06-26 PROCEDURE — 71045 X-RAY EXAM CHEST 1 VIEW: CPT

## 2020-06-26 PROCEDURE — 80053 COMPREHEN METABOLIC PANEL: CPT

## 2020-06-26 PROCEDURE — 70551 MRI BRAIN STEM W/O DYE: CPT | Mod: 26

## 2020-06-26 PROCEDURE — 70551 MRI BRAIN STEM W/O DYE: CPT

## 2020-06-26 PROCEDURE — 93005 ELECTROCARDIOGRAM TRACING: CPT

## 2020-06-26 PROCEDURE — 83880 ASSAY OF NATRIURETIC PEPTIDE: CPT

## 2020-06-26 PROCEDURE — 84484 ASSAY OF TROPONIN QUANT: CPT

## 2020-06-26 RX ORDER — VENLAFAXINE HCL 75 MG
1 CAPSULE, EXT RELEASE 24 HR ORAL
Qty: 0 | Refills: 0 | DISCHARGE
Start: 2020-06-26

## 2020-06-26 RX ORDER — LISINOPRIL 2.5 MG/1
1 TABLET ORAL
Qty: 0 | Refills: 0 | DISCHARGE
Start: 2020-06-26

## 2020-06-26 RX ORDER — METFORMIN HYDROCHLORIDE 850 MG/1
1 TABLET ORAL
Qty: 60 | Refills: 0
Start: 2020-06-26 | End: 2020-07-25

## 2020-06-26 RX ORDER — LISINOPRIL 2.5 MG/1
2 TABLET ORAL
Qty: 0 | Refills: 0 | DISCHARGE
Start: 2020-06-26

## 2020-06-26 RX ORDER — ASPIRIN/CALCIUM CARB/MAGNESIUM 324 MG
1 TABLET ORAL
Qty: 0 | Refills: 0 | DISCHARGE
Start: 2020-06-26

## 2020-06-26 RX ORDER — ASPIRIN/CALCIUM CARB/MAGNESIUM 324 MG
1 TABLET ORAL
Qty: 0 | Refills: 0 | DISCHARGE

## 2020-06-26 RX ORDER — ASPIRIN/CALCIUM CARB/MAGNESIUM 324 MG
1 TABLET ORAL
Qty: 30 | Refills: 0
Start: 2020-06-26 | End: 2020-07-25

## 2020-06-26 RX ORDER — LISINOPRIL 2.5 MG/1
5 TABLET ORAL DAILY
Refills: 0 | Status: DISCONTINUED | OUTPATIENT
Start: 2020-06-26 | End: 2020-06-26

## 2020-06-26 RX ORDER — ATORVASTATIN CALCIUM 80 MG/1
1 TABLET, FILM COATED ORAL
Qty: 30 | Refills: 0
Start: 2020-06-26 | End: 2020-07-25

## 2020-06-26 RX ORDER — LISINOPRIL 2.5 MG/1
1 TABLET ORAL
Qty: 30 | Refills: 0
Start: 2020-06-26 | End: 2020-07-25

## 2020-06-26 RX ORDER — ATORVASTATIN CALCIUM 80 MG/1
1 TABLET, FILM COATED ORAL
Qty: 30 | Refills: 0
Start: 2020-06-26

## 2020-06-26 RX ORDER — ATORVASTATIN CALCIUM 80 MG/1
1 TABLET, FILM COATED ORAL
Qty: 0 | Refills: 0 | DISCHARGE
Start: 2020-06-26

## 2020-06-26 RX ADMIN — HEPARIN SODIUM 5000 UNIT(S): 5000 INJECTION INTRAVENOUS; SUBCUTANEOUS at 05:44

## 2020-06-26 RX ADMIN — Medication 2: at 12:25

## 2020-06-26 RX ADMIN — Medication 75 MILLIGRAM(S): at 12:25

## 2020-06-26 RX ADMIN — FAMOTIDINE 20 MILLIGRAM(S): 10 INJECTION INTRAVENOUS at 05:43

## 2020-06-26 RX ADMIN — HEPARIN SODIUM 5000 UNIT(S): 5000 INJECTION INTRAVENOUS; SUBCUTANEOUS at 14:10

## 2020-06-26 RX ADMIN — Medication 1: at 09:59

## 2020-06-26 RX ADMIN — Medication 1: at 15:46

## 2020-06-26 RX ADMIN — Medication 325 MILLIGRAM(S): at 12:25

## 2020-06-26 NOTE — DISCHARGE NOTE PROVIDER - CARE PROVIDER_API CALL
Cornelio Enriquez  NEUROLOGY  [address] [place] [address]  Phone: [phone]  Fax: [phone]  Follow Up Time: 2 weeks

## 2020-06-26 NOTE — CHART NOTE - NSCHARTNOTEFT_GEN_A_CORE
Consult received for assessment.    -Pt provided with written diabetes nutrition education. Pt encouraged to request RD with further questions.     RD to remain available.

## 2020-06-26 NOTE — DISCHARGE NOTE PROVIDER - NSDCMRMEDTOKEN_GEN_ALL_CORE_FT
atorvastatin 20 mg oral tablet: 1 tab(s) orally once a day (at bedtime)  Ecotrin Adult Low Strength 81 mg oral delayed release tablet: 1 tab(s) orally once a day  lisinopril 5 mg oral tablet: 1 tab(s) orally once a day  metFORMIN 500 mg oral tablet: 1 tab(s) orally 2 times a day   venlafaxine 75 mg oral tablet: 1 tab(s) orally once a day glucometer (per patient&#x27;s insurance): Test blood sugars four times a day. Dispense #1 glucometer.  lisinopril 5 mg oral tablet: 1 tab(s) orally once a day  metFORMIN 500 mg oral tablet: 1 tab(s) orally 2 times a day   test strips (per patient&#x27;s insurance): 1 application subcutaneously 4 times a day. ** Compatible with patient&#x27;s glucometer **  venlafaxine 75 mg oral tablet: 1 tab(s) orally once a day

## 2020-06-26 NOTE — PROGRESS NOTE ADULT - SUBJECTIVE AND OBJECTIVE BOX
INTERVAL HISTORY:  asymptomatic, walking aobut without difficulty  She decribes mainly pain and discomfort of lthe chest and left arm      VITAL SIGNS:  Vital Signs Last 24 Hrs  T(C): 36.6 (26 Jun 2020 07:29), Max: 37 (25 Jun 2020 21:10)  T(F): 97.9 (26 Jun 2020 07:29), Max: 98.6 (25 Jun 2020 21:10)  HR: 74 (26 Jun 2020 07:29) (73 - 79)  BP: 148/83 (26 Jun 2020 07:29) (125/79 - 148/83)  BP(mean): --  RR: 19 (26 Jun 2020 07:29) (18 - 20)  SpO2: 95% (26 Jun 2020 07:29) (94% - 98%)    PHYSICAL EXAMINATION:    Mentation:  nl  Language/Speech: nl  CN: nl  Visual Fields: ful  Motor: nl, gait normal  Sensory:nl  DTR: 2+ symm  Babinski:      MEDS:  MEDICATIONS  (STANDING):  aspirin enteric coated 325 milliGRAM(s) Oral daily  atorvastatin 20 milliGRAM(s) Oral at bedtime  dextrose 5%. 1000 milliLiter(s) (50 mL/Hr) IV Continuous <Continuous>  dextrose 50% Injectable 12.5 Gram(s) IV Push once  dextrose 50% Injectable 25 Gram(s) IV Push once  dextrose 50% Injectable 25 Gram(s) IV Push once  famotidine    Tablet 20 milliGRAM(s) Oral two times a day  heparin   Injectable 5000 Unit(s) SubCutaneous every 8 hours  insulin lispro (HumaLOG) corrective regimen sliding scale   SubCutaneous three times a day before meals  lisinopril 5 milliGRAM(s) Oral daily  venlafaxine 75 milliGRAM(s) Oral daily    MEDICATIONS  (PRN):  dextrose 40% Gel 15 Gram(s) Oral once PRN Blood Glucose LESS THAN 70 milliGRAM(s)/deciliter  glucagon  Injectable 1 milliGRAM(s) IntraMuscular once PRN Glucose LESS THAN 70 milligrams/deciliter  morphine  - Injectable 2 milliGRAM(s) IV Push every 4 hours PRN Severe Pain (7 - 10)  nitroglycerin     SubLingual 0.4 milliGRAM(s) SubLingual every 5 minutes PRN Chest Pain      LABS:                          12.6   8.91  )-----------( 264      ( 25 Jun 2020 10:58 )             39.5     06-25    140  |  103  |  14.0  ----------------------------<  212<H>  4.0   |  26.0  |  0.48<L>    Ca    8.9      25 Jun 2020 10:58    TPro  6.8  /  Alb  3.7  /  TBili  <0.2<L>  /  DBili  x   /  AST  15  /  ALT  22  /  AlkPhos  146<H>  06-25    LIVER FUNCTIONS - ( 25 Jun 2020 10:58 )  Alb: 3.7 g/dL / Pro: 6.8 g/dL / ALK PHOS: 146 U/L / ALT: 22 U/L / AST: 15 U/L / GGT: x               RADIOLOGY & ADDITIONAL STUDIES:    MRI- + old cerebellar encephalomalacia, no acute changes  MRA- confirms tiny aneurysm  CTAs - no large  vessel disease    IMPRESSION & PLAN:      Atypical symptoms as described- TIA unlikley  Incidental cerebral aneurysm    REC:  Medical and Cardiac evaluation and treatment as indicated- r/o atypical angina  Cerebrovascular risk factor assessment and management  Antiplatelet therapy as prescribed.  Neurosurgery eval of aneurysm- can be as outpatient  Consider MRI of Cspine and EMG if symtptoms persisit- can be done as outpatient  Will not actively follow.   Neurologically cleared for discharge/disposition.  Please recontact as needed.  Follow up in office in 2-4 weeks as instructed.

## 2020-06-26 NOTE — DISCHARGE NOTE PROVIDER - HOSPITAL COURSE
51 yo F obese with depression no other significant  PMHX admitted with atypical chest pain , left sided weakness arm pain , dyspnea     BP on arrival is high as well , CT of head done showed cerebellar infarct cronic , small aneursm . Pt's day 2 symptoms resolved , MR of brain and MRA of head ordered , showed no acute infarcts , 3 mm aneurism .  Ecotrin simvastatin given . Pt's had serial cardiac enzymes neg , TTE normal EF no wall motion abnormalities , chest pain resolved . Pt's also noted to have high BG hemoglobin a1c ordered 7.8 , new dx of diabetes , educated about BG monitoring , referal given to home care and diabetes     pt has no insurance apparently will go to clinic

## 2020-06-26 NOTE — DISCHARGE NOTE NURSING/CASE MANAGEMENT/SOCIAL WORK - NSDCPEPTSTRK_GEN_ALL_CORE
Prescribed medications/Stroke warning signs and symptoms/Signs and symptoms of stroke/Need for follow up after discharge/Risk factors for stroke/Stroke education booklet/Call 911 for stroke/Stroke support groups for patients, families, and friends

## 2020-06-26 NOTE — PROGRESS NOTE ADULT - ASSESSMENT
51 yo F obese with depression no other significant  PMHX admitted with atypical chest pain , left sided weakness arm pain , dyspnea   BP on arrival is high , neurology consulted , MR of brain with old cerebellar infarct , TTE normal EF no wall motion abnormalities , chest pain resolved , troponin neg       1- Left sided weakness / numbness resolved   cont aspirin 81 , risk managament   on statin   life style changes , diet and exercise recommended   neurology input appreciated   symptoms resolved     2- Dm type 2 new dx   diet education , rec weight loss   will get diabetic  education prior discharge   and refer to outpatient diabetes meeting     3- high blood pressure   started low dose lisinopril   cont to monitor outpatient   instructed pt to buy machine to check her BP   follow up with her PCP in 3-4 days     4- brain aneurism incidental on Mr   small , per neurology follow up outpatient     if symptoms persist may need outpatient MR EMG per Dr Enriquez     will discharge home soon

## 2020-06-26 NOTE — DISCHARGE NOTE NURSING/CASE MANAGEMENT/SOCIAL WORK - PATIENT PORTAL LINK FT
You can access the FollowMyHealth Patient Portal offered by [facility] by registering at the following website: [email or web address]. By joining [email or web address]’s [email or web address] portal, you will also be able to view your health information using other applications (apps) compatible with our system.

## 2020-06-26 NOTE — PROGRESS NOTE ADULT - SUBJECTIVE AND OBJECTIVE BOX
Internal Medicine Hospitalist Progress Note  follow up for left sided weakness , resolved   Pt is feeling better today , denies any chest pain, no dizziness , no arm weakness   mood is good   seen with  Svetlana davis/carly neurologist Dr Enriquez           ROS: as above, all remaining ROS are negative.       BACKGROUND:  MEDICATIONS  (STANDING):  aspirin enteric coated 325 milliGRAM(s) Oral daily  atorvastatin 20 milliGRAM(s) Oral at bedtime  dextrose 5%. 1000 milliLiter(s) (50 mL/Hr) IV Continuous <Continuous>  dextrose 50% Injectable 12.5 Gram(s) IV Push once  dextrose 50% Injectable 25 Gram(s) IV Push once  dextrose 50% Injectable 25 Gram(s) IV Push once  famotidine    Tablet 20 milliGRAM(s) Oral two times a day  heparin   Injectable 5000 Unit(s) SubCutaneous every 8 hours  insulin lispro (HumaLOG) corrective regimen sliding scale   SubCutaneous three times a day before meals  lisinopril 5 milliGRAM(s) Oral daily  venlafaxine 75 milliGRAM(s) Oral daily    MEDICATIONS  (PRN):  dextrose 40% Gel 15 Gram(s) Oral once PRN Blood Glucose LESS THAN 70 milliGRAM(s)/deciliter  glucagon  Injectable 1 milliGRAM(s) IntraMuscular once PRN Glucose LESS THAN 70 milligrams/deciliter  morphine  - Injectable 2 milliGRAM(s) IV Push every 4 hours PRN Severe Pain (7 - 10)  nitroglycerin     SubLingual 0.4 milliGRAM(s) SubLingual every 5 minutes PRN Chest Pain    Allergies    No Known Allergies    Intolerances            VITALS:  Vital Signs Last 24 Hrs  T(C): 36.6 (26 Jun 2020 07:29), Max: 37 (25 Jun 2020 21:10)  T(F): 97.9 (26 Jun 2020 07:29), Max: 98.6 (25 Jun 2020 21:10)  HR: 74 (26 Jun 2020 07:29) (73 - 79)  BP: 148/83 (26 Jun 2020 07:29) (125/79 - 148/83)  BP(mean): --  RR: 19 (26 Jun 2020 07:29) (18 - 20)  SpO2: 95% (26 Jun 2020 07:29) (94% - 98%) Daily     Daily   CAPILLARY BLOOD GLUCOSE      POCT Blood Glucose.: 185 mg/dL (26 Jun 2020 09:44)  POCT Blood Glucose.: 201 mg/dL (25 Jun 2020 21:46)  POCT Blood Glucose.: 121 mg/dL (25 Jun 2020 16:43)    A1C with Estimated Average Glucose Result: 7.8 % (06.26.20 @ 09:42)          PHYSICAL EXAM:      Constitutional: awake alert no distress    Neck: supple , no JVD     Respiratory: CTA bilateral     Cardiovascular: regular s1/s2     Gastrointestinal: soft no tenderness , BS positive     Extremities: no pretibial edema     Neurological: AXOX3 , no focal deficits LUE LLE MS 5/5     Skin: normal color no rash             LABS:                        12.6   8.91  )-----------( 264      ( 25 Jun 2020 10:58 )             39.5     06-25    140  |  103  |  14.0  ----------------------------<  212<H>  4.0   |  26.0  |  0.48<L>    Ca    8.9      25 Jun 2020 10:58    TPro  6.8  /  Alb  3.7  /  TBili  <0.2<L>  /  DBili  x   /  AST  15  /  ALT  22  /  AlkPhos  146<H>  06-25        Radiology :        < from: MR Head No Cont (06.26.20 @ 08:51) >    Brain MRI: No acute infarct. Chronic right cerebellar lacunar infarct.     Brain MRA:   1.  No large vessel occlusion.  2.  Redemonstration of a 3 mm inferiorly directed outpouching arising from the left supraclinoid ICA suggesting an aneurysm versus infundibulum.            < end of copied text >

## 2021-01-01 NOTE — DISCHARGE NOTE NURSING/CASE MANAGEMENT/SOCIAL WORK - NSTRANSFERBELONGINGSRESP_GEN_A_NUR
----- Message from Elaine Rodriguez sent at 2021 11:32 AM CDT -----  Subject: Appointment Request    Reason for Call: Routine ED Follow Up Visit    QUESTIONS  Type of Appointment? Established Patient  Reason for appointment request? No appointments available during search  Additional Information for Provider? Patient of Dr. Alethea Khan Was at ER on   10/23 [facility]), for Rhino virus, still has a bad cough, runny nose,   fever down, no appt available Please call [phone]  ---------------------------------------------------------------------------  --------------  CALL BACK INFO  What is the best way for the office to contact you? OK to leave message on   voicemail  Preferred Call Back Phone Number? [phone]  ---------------------------------------------------------------------------  --------------  SCRIPT ANSWERS  Relationship to Patient? Parent  Representative Name? Consuelo Freeman  Additional information verified (besides Name and Date of Birth)? Address  (Patient requests to see provider urgently. )? No  Do you have any questions for your/childs primary care provider that need   to be answered prior to the appointment? No  Have you been diagnosed with, awaiting test results for, or told that you   are suspected of having COVID-19 (Coronavirus)? (If patient has tested   negative or was tested as a requirement for work, school, or travel and   not based on symptoms, answer no)? No  Within the past two weeks have you developed any of the following symptoms   (answer no if symptoms have been present longer than 2 weeks or began   more than 2 weeks ago)? Fever or Chills, Cough, Shortness of breath or   difficulty breathing, Loss of taste or smell, Sore throat, Nasal   congestion, Sneezing or runny nose, Fatigue or generalized body aches   (answer no if pain is specific to a body part e.g. back pain), Diarrhea,   Headache?  Yes yes

## 2021-01-09 ENCOUNTER — EMERGENCY (EMERGENCY)
Facility: HOSPITAL | Age: 51
LOS: 1 days | Discharge: HOME | End: 2021-01-09
Attending: EMERGENCY MEDICINE
Payer: MEDICAID

## 2021-01-09 VITALS
SYSTOLIC BLOOD PRESSURE: 154 MMHG | TEMPERATURE: 98 F | OXYGEN SATURATION: 97 % | HEART RATE: 91 BPM | RESPIRATION RATE: 20 BRPM | DIASTOLIC BLOOD PRESSURE: 91 MMHG

## 2021-01-09 VITALS — WEIGHT: 211.64 LBS | HEIGHT: 63 IN

## 2021-01-09 PROCEDURE — 99284 EMERGENCY DEPT VISIT MOD MDM: CPT

## 2021-01-09 PROCEDURE — 96372 THER/PROPH/DIAG INJ SC/IM: CPT

## 2021-01-09 PROCEDURE — 99283 EMERGENCY DEPT VISIT LOW MDM: CPT | Mod: 25

## 2021-01-09 RX ORDER — METHOCARBAMOL 500 MG/1
1 TABLET, FILM COATED ORAL
Qty: 15 | Refills: 0
Start: 2021-01-09 | End: 2021-01-13

## 2021-01-09 RX ORDER — IBUPROFEN 200 MG
1 TABLET ORAL
Qty: 15 | Refills: 0
Start: 2021-01-09 | End: 2021-01-13

## 2021-01-09 RX ORDER — KETOROLAC TROMETHAMINE 30 MG/ML
30 SYRINGE (ML) INJECTION ONCE
Refills: 0 | Status: DISCONTINUED | OUTPATIENT
Start: 2021-01-09 | End: 2021-01-09

## 2021-01-09 RX ORDER — METHOCARBAMOL 500 MG/1
1500 TABLET, FILM COATED ORAL ONCE
Refills: 0 | Status: COMPLETED | OUTPATIENT
Start: 2021-01-09 | End: 2021-01-09

## 2021-01-09 RX ADMIN — METHOCARBAMOL 1500 MILLIGRAM(S): 500 TABLET, FILM COATED ORAL at 11:59

## 2021-01-09 RX ADMIN — Medication 30 MILLIGRAM(S): at 11:59

## 2021-01-09 RX ADMIN — Medication 40 MILLIGRAM(S): at 11:59

## 2021-01-09 NOTE — ED PROVIDER NOTE - OBJECTIVE STATEMENT
49 y/o F with left LBP radiating to buttocks and down posterior aspect left leg with paresthesia worsening over 3 days.  She denies trauma, bowel or bladder dysfunction, or saddle anesthesia.  The pain improves when she is up and walking or busy doing chores, worse with sitting and laying down.  Last took ibuprofen 600mg at 5 am with some relief. 50 year old female with left LBP radiating to buttocks and down posterior aspect left leg with paresthesia worsening over 3 days.  She denies trauma, bowel or bladder dysfunction, or saddle anesthesia.  The pain improves when she is up and walking or busy doing chores, worse with sitting and laying down.  Last took ibuprofen 600mg at 5 am with some relief.

## 2021-01-09 NOTE — ED PROVIDER NOTE - NSFOLLOWUPINSTRUCTIONS_ED_ALL_ED_FT
- take all medication as directed with food for 5 full days  - do not drive while taking robaxin, it will make you sleepy  - call to make a follow up appointment with spine center if the pain lasts longer than 5 days

## 2021-01-09 NOTE — ED PROVIDER NOTE - PATIENT PORTAL LINK FT
You can access the FollowMyHealth Patient Portal offered by [facility] by registering at the following website: [email or web address]. By joining [facility]’s FollowMyHealth portal, you will also be able to view your health information using other applications (apps) compatible with our system.

## 2021-01-09 NOTE — ED PROVIDER NOTE - NSFOLLOWUPCLINICS_GEN_ALL_ED_FT
[facility] - [facility] ([place], [place])  Neurosurgery/Spine  [address] [place] [address]  Phone: [phone]  Fax:   Follow Up Time:

## 2021-01-09 NOTE — ED PROVIDER NOTE - FAMILY HISTORY
Family history of hypertension in mother, she is alive 85 yo     Father  Still living? Unknown  Family history of coronary artery disease, Age at diagnosis: Age Unknown

## 2021-01-09 NOTE — ED PROVIDER NOTE - CLINICAL SUMMARY MEDICAL DECISION MAKING FREE TEXT BOX
Neurologically intact 57 y/o F with lumbar radiculopathy, no evidence of cauda equina, able to ambulate.  Will rx NSAID, muscle relaxer and steroids, spine referral.

## 2021-01-18 ENCOUNTER — EMERGENCY (EMERGENCY)
Facility: HOSPITAL | Age: 51
LOS: 1 days | Discharge: HOME | End: 2021-01-18
Attending: EMERGENCY MEDICINE
Payer: MEDICAID

## 2021-01-18 VITALS
SYSTOLIC BLOOD PRESSURE: 112 MMHG | HEART RATE: 88 BPM | RESPIRATION RATE: 16 BRPM | OXYGEN SATURATION: 99 % | TEMPERATURE: 98 F | HEIGHT: 63 IN | WEIGHT: 212.97 LBS | DIASTOLIC BLOOD PRESSURE: 72 MMHG

## 2021-01-18 PROBLEM — E11.9 TYPE 2 DIABETES MELLITUS WITHOUT COMPLICATIONS: Chronic | Status: ACTIVE | Noted: 2021-01-09

## 2021-01-18 LAB — SARS-COV-2 RNA SPEC QL NAA+PROBE: DETECTED

## 2021-01-18 PROCEDURE — 96372 THER/PROPH/DIAG INJ SC/IM: CPT

## 2021-01-18 PROCEDURE — U0005: CPT

## 2021-01-18 PROCEDURE — 99283 EMERGENCY DEPT VISIT LOW MDM: CPT | Mod: 25

## 2021-01-18 PROCEDURE — 99284 EMERGENCY DEPT VISIT MOD MDM: CPT

## 2021-01-18 PROCEDURE — U0003: CPT

## 2021-01-18 RX ORDER — IBUPROFEN 200 MG
600 TABLET ORAL ONCE
Refills: 0 | Status: COMPLETED | OUTPATIENT
Start: 2021-01-18 | End: 2021-01-18

## 2021-01-18 RX ORDER — METHOCARBAMOL 500 MG/1
750 TABLET, FILM COATED ORAL ONCE
Refills: 0 | Status: COMPLETED | OUTPATIENT
Start: 2021-01-18 | End: 2021-01-18

## 2021-01-18 RX ORDER — DEXAMETHASONE 0.5 MG/5ML
10 ELIXIR ORAL ONCE
Refills: 0 | Status: COMPLETED | OUTPATIENT
Start: 2021-01-18 | End: 2021-01-18

## 2021-01-18 RX ADMIN — Medication 600 MILLIGRAM(S): at 09:02

## 2021-01-18 RX ADMIN — METHOCARBAMOL 750 MILLIGRAM(S): 500 TABLET, FILM COATED ORAL at 09:02

## 2021-01-18 RX ADMIN — Medication 10 MILLIGRAM(S): at 09:03

## 2021-01-18 NOTE — ED PROVIDER NOTE - CHPI ED SYMPTOMS NEG
no anorexia/no bladder dysfunction/no bowel dysfunction/no constipation/no fatigue/no neck tenderness/no numbness/no difficulty bearing weight/no motor function loss

## 2021-01-18 NOTE — ED PROVIDER NOTE - CLINICAL SUMMARY MEDICAL DECISION MAKING FREE TEXT BOX
The patient presents with L sided low back pain radiating down to L leg most likely sciatica and will dc home and follow up with [facility] and spine clinic

## 2021-01-18 NOTE — ED PROVIDER NOTE - OBJECTIVE STATEMENT
The patient is a 50 year old female presents with left sided back pain radiating down to L leg and was here for same problem but unable to follow up with spine surgery clinic.  No motor No sensory loss, No bowel dysfunction, No bladder dysfunction, No CP, No SOB, No abd pain  The patient has exposure to covid as the patient was told by her family member today that they have test positive today.

## 2021-01-18 NOTE — ED ADULT TRIAGE NOTE - CHIEF COMPLAINT QUOTE
Left lower back pain radiating down left leg to big toe, was seen 1 week ago for same, symptoms worsening.

## 2021-01-18 NOTE — ED PROVIDER NOTE - PATIENT PORTAL LINK FT
You can access the FollowMyHealth Patient Portal offered by [facility] by registering at the following website: [email or web address]. By joining [employer]’s FollowMyHealth portal, you will also be able to view your health information using other applications (apps) compatible with our system.

## 2021-01-25 PROBLEM — Z00.00 ENCOUNTER FOR PREVENTIVE HEALTH EXAMINATION: Status: ACTIVE | Noted: 2021-01-25

## 2021-01-27 ENCOUNTER — APPOINTMENT (OUTPATIENT)
Dept: NEUROSURGERY | Facility: CLINIC | Age: 51
End: 2021-01-27

## 2021-01-30 ENCOUNTER — EMERGENCY (EMERGENCY)
Facility: HOSPITAL | Age: 51
LOS: 1 days | Discharge: HOME | End: 2021-01-30
Attending: EMERGENCY MEDICINE
Payer: MEDICAID

## 2021-01-30 VITALS
OXYGEN SATURATION: 98 % | HEIGHT: 63 IN | TEMPERATURE: 98 F | WEIGHT: 203.93 LBS | SYSTOLIC BLOOD PRESSURE: 188 MMHG | HEART RATE: 91 BPM | DIASTOLIC BLOOD PRESSURE: 99 MMHG | RESPIRATION RATE: 20 BRPM

## 2021-01-30 LAB — HCG UR QL: NEGATIVE — SIGNIFICANT CHANGE UP

## 2021-01-30 PROCEDURE — 72131 CT LUMBAR SPINE W/O DYE: CPT | Mod: 26

## 2021-01-30 PROCEDURE — 96372 THER/PROPH/DIAG INJ SC/IM: CPT

## 2021-01-30 PROCEDURE — 99284 EMERGENCY DEPT VISIT MOD MDM: CPT | Mod: 25

## 2021-01-30 PROCEDURE — 99284 EMERGENCY DEPT VISIT MOD MDM: CPT

## 2021-01-30 PROCEDURE — 72131 CT LUMBAR SPINE W/O DYE: CPT

## 2021-01-30 PROCEDURE — 81025 URINE PREGNANCY TEST: CPT

## 2021-01-30 RX ORDER — KETOROLAC TROMETHAMINE 30 MG/ML
30 SYRINGE (ML) INJECTION ONCE
Refills: 0 | Status: DISCONTINUED | OUTPATIENT
Start: 2021-01-30 | End: 2021-01-30

## 2021-01-30 RX ORDER — OXYCODONE AND ACETAMINOPHEN 5; 325 MG/1; MG/1
1 TABLET ORAL EVERY 4 HOURS
Refills: 0 | Status: DISCONTINUED | OUTPATIENT
Start: 2021-01-30 | End: 2021-01-30

## 2021-01-30 RX ORDER — CYCLOBENZAPRINE HYDROCHLORIDE 10 MG/1
10 TABLET, FILM COATED ORAL ONCE
Refills: 0 | Status: COMPLETED | OUTPATIENT
Start: 2021-01-30 | End: 2021-01-30

## 2021-01-30 RX ORDER — LIDOCAINE 4 G/100G
1 CREAM TOPICAL ONCE
Refills: 0 | Status: COMPLETED | OUTPATIENT
Start: 2021-01-30 | End: 2021-01-30

## 2021-01-30 RX ADMIN — LIDOCAINE 1 PATCH: 4 CREAM TOPICAL at 11:13

## 2021-01-30 RX ADMIN — OXYCODONE AND ACETAMINOPHEN 1 TABLET(S): 5; 325 TABLET ORAL at 12:35

## 2021-01-30 RX ADMIN — Medication 60 MILLIGRAM(S): at 11:13

## 2021-01-30 RX ADMIN — CYCLOBENZAPRINE HYDROCHLORIDE 10 MILLIGRAM(S): 10 TABLET, FILM COATED ORAL at 11:13

## 2021-01-30 RX ADMIN — Medication 30 MILLIGRAM(S): at 11:13

## 2021-01-30 NOTE — ED STATDOCS - NSFOLLOWUPINSTRUCTIONS_ED_ALL_ED_FT
Take Tylenol every 6 hours   Use heating pads   take steroids the next couple days   Follow up with spine asap     Feel better !!!         Back Pain    Back pain is very common in adults. The cause of back pain is rarely dangerous and the pain often gets better over time. The cause of your back pain may not be known and may include strain of muscles or ligaments, degeneration of the spinal disks, or arthritis. Occasionally the pain may radiate down your leg(s). Over-the-counter medicines to reduce pain and inflammation are often the most helpful. Stretching and remaining active frequently helps the healing process.     SEEK IMMEDIATE MEDICAL CARE IF YOU HAVE ANY OF THE FOLLOWING SYMPTOMS: bowel or bladder control problems, unusual weakness or numbness in your arms or legs, nausea or vomiting, abdominal pain, fever, dizziness/lightheadedness.

## 2021-01-30 NOTE — ED STATDOCS - CLINICAL SUMMARY MEDICAL DECISION MAKING FREE TEXT BOX
symptoms consistent with lumbar radiculopathy, since this is the pt's 3rd visit will perform work up with lumbar imaging

## 2021-01-30 NOTE — ED ADULT NURSE NOTE - NSIMPLEMENTINTERV_GEN_ALL_ED
Implemented All Fall Risk Interventions:  [place] to call system. Call bell, personal items and telephone within reach. Instruct patient to call for assistance. Room bathroom lighting operational. Non-slip footwear when patient is off stretcher. Physically safe environment: no spills, clutter or unnecessary equipment. Stretcher in lowest position, wheels locked, appropriate side rails in place. Provide visual cue, wrist band, yellow gown, etc. Monitor gait and stability. Monitor for mental status changes and reorient to person, place, and time. Review medications for side effects contributing to fall risk. Reinforce activity limits and safety measures with patient and family.

## 2021-01-30 NOTE — ED STATDOCS - CARE PROVIDER_API CALL
Tan Sierra; PhD)  Neurosurgery  [address] [place] [address]  Phone: [phone]  Fax: [phone]  Follow Up Time:

## 2021-01-30 NOTE — ED STATDOCS - MUSCULOSKELETAL, MLM
+ left paraspinal lumbar ttp,+ decreased sensation to the left lateral thigh and + positive straight leg raise to the left

## 2021-01-30 NOTE — ED STATDOCS - ATTENDING CONTRIBUTION TO CARE
I, Jose Angel Quiles, performed the initial face to face bedside interview with this patient regarding history of present illness, review of symptoms and relevant past medical, social and family history.  I completed an independent physical examination.  I was the initial provider who evaluated this patient. I have signed out the follow up of any pending tests (i.e. labs, radiological studies) to the ACP.  I have communicated the patient’s plan of care and disposition with the ACP.

## 2021-01-30 NOTE — ED ADULT TRIAGE NOTE - CHIEF COMPLAINT QUOTE
Pt walks in limping c/o L leg pain from the lower back down to the leg for almost one month , pt was here twice received pain meds without much improvement , pain got worse today

## 2021-01-30 NOTE — ED STATDOCS - NS ED MD DISPO DISCHARGE
DVT PPx: IMPROVE Score of 0, no indication for pharmacological ppx  Diet: Regular  Dispo:    Polly Garcia PGY-1  Internal Medicine   Pager [phone] Home

## 2021-01-30 NOTE — ED STATDOCS - OBJECTIVE STATEMENT
50 y.o F with a PMHx of DM, HTN, and HLD presents to the ED with c/o back pain for 4 weeks. Pt has been to the ED twice and was given injections. Pt took her pain pills of muscle relaxers and ibuprofen but the pain was unbearable where she could not sleep. Pt does not have insurance and as a result could not see the spine specialist. Pt is waiting to see her PMD. Pt states that her left leg is numb as well.     : Quang

## 2021-01-30 NOTE — ED STATDOCS - PATIENT PORTAL LINK FT
You can access the FollowMyHealth Patient Portal offered by [facility] by registering at the following website: [email or web address]. By joining [email or web address]’s FollowMyHealth portal, you will also be able to view your health information using other applications (apps) compatible with our system.

## 2021-01-30 NOTE — ED ADULT NURSE NOTE - OBJECTIVE STATEMENT
Assumed  care 1100 pt in apparent distress co left lower back pain radiating to left leg, pt having difficulty walking.

## 2021-01-30 NOTE — ED STATDOCS - PROGRESS NOTE DETAILS
Discussed the + CT results with the pt, + for herniated discs. Explained this is the cause of her symptoms. Explained the importance of following up with spine asap -  Johare

## 2021-02-11 ENCOUNTER — EMERGENCY (EMERGENCY)
Facility: HOSPITAL | Age: 51
LOS: 1 days | Discharge: HOME | End: 2021-02-11
Attending: EMERGENCY MEDICINE
Payer: MEDICAID

## 2021-02-11 VITALS
OXYGEN SATURATION: 98 % | DIASTOLIC BLOOD PRESSURE: 93 MMHG | SYSTOLIC BLOOD PRESSURE: 154 MMHG | HEIGHT: 63 IN | WEIGHT: 205.91 LBS | RESPIRATION RATE: 20 BRPM | TEMPERATURE: 99 F | HEART RATE: 87 BPM

## 2021-02-11 VITALS
HEART RATE: 78 BPM | DIASTOLIC BLOOD PRESSURE: 65 MMHG | RESPIRATION RATE: 16 BRPM | SYSTOLIC BLOOD PRESSURE: 132 MMHG | OXYGEN SATURATION: 99 % | TEMPERATURE: 98 F

## 2021-02-11 LAB
ALBUMIN SERPL ELPH-MCNC: 3.8 G/DL — SIGNIFICANT CHANGE UP (ref 3.3–5.2)
ALP SERPL-CCNC: 182 U/L — HIGH (ref 40–120)
ALT FLD-CCNC: 25 U/L — SIGNIFICANT CHANGE UP
ANION GAP SERPL CALC-SCNC: 12 MMOL/L — SIGNIFICANT CHANGE UP (ref 5–17)
APPEARANCE UR: CLEAR — SIGNIFICANT CHANGE UP
AST SERPL-CCNC: 18 U/L — SIGNIFICANT CHANGE UP
BACTERIA # UR AUTO: NEGATIVE — SIGNIFICANT CHANGE UP
BASOPHILS # BLD AUTO: 0.04 K/UL — SIGNIFICANT CHANGE UP (ref 0–0.2)
BASOPHILS NFR BLD AUTO: 0.5 % — SIGNIFICANT CHANGE UP (ref 0–2)
BILIRUB SERPL-MCNC: <0.2 MG/DL — LOW (ref 0.4–2)
BILIRUB UR-MCNC: NEGATIVE — SIGNIFICANT CHANGE UP
BUN SERPL-MCNC: 22 MG/DL — HIGH (ref 8–20)
CALCIUM SERPL-MCNC: 9.2 MG/DL — SIGNIFICANT CHANGE UP (ref 8.6–10.2)
CHLORIDE SERPL-SCNC: 101 MMOL/L — SIGNIFICANT CHANGE UP (ref 98–107)
CO2 SERPL-SCNC: 25 MMOL/L — SIGNIFICANT CHANGE UP (ref 22–29)
COLOR SPEC: YELLOW — SIGNIFICANT CHANGE UP
CREAT SERPL-MCNC: 0.47 MG/DL — LOW (ref 0.5–1.3)
DIFF PNL FLD: ABNORMAL
EOSINOPHIL # BLD AUTO: 0.17 K/UL — SIGNIFICANT CHANGE UP (ref 0–0.5)
EOSINOPHIL NFR BLD AUTO: 1.9 % — SIGNIFICANT CHANGE UP (ref 0–6)
EPI CELLS # UR: SIGNIFICANT CHANGE UP
GLUCOSE SERPL-MCNC: 146 MG/DL — HIGH (ref 70–99)
GLUCOSE UR QL: 50 MG/DL
HCG UR QL: NEGATIVE — SIGNIFICANT CHANGE UP
HCT VFR BLD CALC: 39.7 % — SIGNIFICANT CHANGE UP (ref 34.5–45)
HGB BLD-MCNC: 12.4 G/DL — SIGNIFICANT CHANGE UP (ref 11.5–15.5)
IMM GRANULOCYTES NFR BLD AUTO: 0.3 % — SIGNIFICANT CHANGE UP (ref 0–1.5)
KETONES UR-MCNC: NEGATIVE — SIGNIFICANT CHANGE UP
LEUKOCYTE ESTERASE UR-ACNC: ABNORMAL
LYMPHOCYTES # BLD AUTO: 2.47 K/UL — SIGNIFICANT CHANGE UP (ref 1–3.3)
LYMPHOCYTES # BLD AUTO: 28.1 % — SIGNIFICANT CHANGE UP (ref 13–44)
MCHC RBC-ENTMCNC: 29.2 PG — SIGNIFICANT CHANGE UP (ref 27–34)
MCHC RBC-ENTMCNC: 31.2 GM/DL — LOW (ref 32–36)
MCV RBC AUTO: 93.6 FL — SIGNIFICANT CHANGE UP (ref 80–100)
MONOCYTES # BLD AUTO: 0.66 K/UL — SIGNIFICANT CHANGE UP (ref 0–0.9)
MONOCYTES NFR BLD AUTO: 7.5 % — SIGNIFICANT CHANGE UP (ref 2–14)
NEUTROPHILS # BLD AUTO: 5.43 K/UL — SIGNIFICANT CHANGE UP (ref 1.8–7.4)
NEUTROPHILS NFR BLD AUTO: 61.7 % — SIGNIFICANT CHANGE UP (ref 43–77)
NITRITE UR-MCNC: NEGATIVE — SIGNIFICANT CHANGE UP
PH UR: 6 — SIGNIFICANT CHANGE UP (ref 5–8)
PLATELET # BLD AUTO: 280 K/UL — SIGNIFICANT CHANGE UP (ref 150–400)
POTASSIUM SERPL-MCNC: 4.4 MMOL/L — SIGNIFICANT CHANGE UP (ref 3.5–5.3)
POTASSIUM SERPL-SCNC: 4.4 MMOL/L — SIGNIFICANT CHANGE UP (ref 3.5–5.3)
PROT SERPL-MCNC: 6.8 G/DL — SIGNIFICANT CHANGE UP (ref 6.6–8.7)
PROT UR-MCNC: 15 MG/DL
RBC # BLD: 4.24 M/UL — SIGNIFICANT CHANGE UP (ref 3.8–5.2)
RBC # FLD: 13.2 % — SIGNIFICANT CHANGE UP (ref 10.3–14.5)
RBC CASTS # UR COMP ASSIST: ABNORMAL /HPF (ref 0–4)
SODIUM SERPL-SCNC: 138 MMOL/L — SIGNIFICANT CHANGE UP (ref 135–145)
SP GR SPEC: 1.02 — SIGNIFICANT CHANGE UP (ref 1.01–1.02)
UROBILINOGEN FLD QL: NEGATIVE MG/DL — SIGNIFICANT CHANGE UP
WBC # BLD: 8.8 K/UL — SIGNIFICANT CHANGE UP (ref 3.8–10.5)
WBC # FLD AUTO: 8.8 K/UL — SIGNIFICANT CHANGE UP (ref 3.8–10.5)
WBC UR QL: SIGNIFICANT CHANGE UP

## 2021-02-11 PROCEDURE — 80053 COMPREHEN METABOLIC PANEL: CPT

## 2021-02-11 PROCEDURE — 74176 CT ABD & PELVIS W/O CONTRAST: CPT | Mod: 26

## 2021-02-11 PROCEDURE — 36415 COLL VENOUS BLD VENIPUNCTURE: CPT

## 2021-02-11 PROCEDURE — 96374 THER/PROPH/DIAG INJ IV PUSH: CPT

## 2021-02-11 PROCEDURE — 96375 TX/PRO/DX INJ NEW DRUG ADDON: CPT

## 2021-02-11 PROCEDURE — 99285 EMERGENCY DEPT VISIT HI MDM: CPT

## 2021-02-11 PROCEDURE — 99284 EMERGENCY DEPT VISIT MOD MDM: CPT | Mod: 25

## 2021-02-11 PROCEDURE — 74176 CT ABD & PELVIS W/O CONTRAST: CPT

## 2021-02-11 PROCEDURE — 85025 COMPLETE CBC W/AUTO DIFF WBC: CPT

## 2021-02-11 PROCEDURE — 81025 URINE PREGNANCY TEST: CPT

## 2021-02-11 PROCEDURE — 81001 URINALYSIS AUTO W/SCOPE: CPT

## 2021-02-11 PROCEDURE — 87086 URINE CULTURE/COLONY COUNT: CPT

## 2021-02-11 RX ORDER — MORPHINE SULFATE 50 MG/1
6 CAPSULE, EXTENDED RELEASE ORAL ONCE
Refills: 0 | Status: DISCONTINUED | OUTPATIENT
Start: 2021-02-11 | End: 2021-02-11

## 2021-02-11 RX ORDER — LIDOCAINE 4 G/100G
1 CREAM TOPICAL
Qty: 4 | Refills: 0
Start: 2021-02-11 | End: 2021-02-14

## 2021-02-11 RX ORDER — DEXAMETHASONE 0.5 MG/5ML
10 ELIXIR ORAL ONCE
Refills: 0 | Status: COMPLETED | OUTPATIENT
Start: 2021-02-11 | End: 2021-02-11

## 2021-02-11 RX ORDER — IBUPROFEN 200 MG
1 TABLET ORAL
Qty: 20 | Refills: 0
Start: 2021-02-11 | End: 2021-02-15

## 2021-02-11 RX ORDER — KETOROLAC TROMETHAMINE 30 MG/ML
15 SYRINGE (ML) INJECTION ONCE
Refills: 0 | Status: DISCONTINUED | OUTPATIENT
Start: 2021-02-11 | End: 2021-02-11

## 2021-02-11 RX ORDER — DIAZEPAM 5 MG
1 TABLET ORAL
Qty: 6 | Refills: 0
Start: 2021-02-11 | End: 2021-02-13

## 2021-02-11 RX ADMIN — Medication 15 MILLIGRAM(S): at 10:31

## 2021-02-11 RX ADMIN — Medication 10 MILLIGRAM(S): at 12:46

## 2021-02-11 RX ADMIN — MORPHINE SULFATE 6 MILLIGRAM(S): 50 CAPSULE, EXTENDED RELEASE ORAL at 10:33

## 2021-02-11 NOTE — ED PROVIDER NOTE - NSFOLLOWUPCLINICS_GEN_ALL_ED_FT
[facility] - [facility]  Neurosurgery/Spine  [address] [place] [id number]  Phone: [phone]  Fax:   Follow Up Time:

## 2021-02-11 NOTE — ED PROVIDER NOTE - PROGRESS NOTE DETAILS
Juan Carlos Lemus PA-C: Pt informed that they will be contacted if after culture is resulted there is a need for change in ABx or return to the ED.

## 2021-02-11 NOTE — ED PROVIDER NOTE - CLINICAL SUMMARY MEDICAL DECISION MAKING FREE TEXT BOX
PT with stable VS, no acute distress, non toxic appearing, tolerating PO in the ED, PT neuro vasc intact, sig improvement after NSAID, no acute findings, on CT, Pt informed of all lab work, no UTI, pt to be dc home with pain meds follow up to neuro, educated about when to return to the ED if needed. PT verbalizes that he understands all instructions and results. Pt informed that ED is open and available 24/7 365 days a yr, encouraged to return to the ED if they have any change in condition, or feel the need for revaluation.    utilized to obtain History, ROS, Physical Exam, explanations of results and plan of care, as well as follow up instructions.

## 2021-02-11 NOTE — ED PROVIDER NOTE - NS ED ROS FT
ROS: CONTUSIONAL: Denies fever, chills, fatigue, wt loss. HEAD: Denies trauma, HA, Dizziness. EYE: Denies Acute visual changes, diplopia. ENMT: Denies change in hearing, tinnitus, epistaxis, difficulty swallowing, sore throat. CARDIO: Denies CP, palpitations, edema. RESP: Denies Cough, SOB , Diff breathing, hemoptysis. GI: Denies N/V, ABD pain, change in bowel movement. URINARY: Denies difficulty urinating, pelvic pain. MS: +back pain, Denies joint pain,  weakness, decreased ROM, swelling. NEURO: Denies change in gait, seizures, loss of sensation, dizziness, confusion LOC.  PSY: NO SI/HI.

## 2021-02-11 NOTE — ED PROVIDER NOTE - ADDITIONAL NOTES AND INSTRUCTIONS:
PT was evaluated At [facility] ED and was found to have a condition that warranted time of to rest and heal from WORK/SCHOOL.   Juan Carlos Lemus PA-C

## 2021-02-11 NOTE — ED PROVIDER NOTE - PATIENT PORTAL LINK FT
You can access the FollowMyHealth Patient Portal offered by [facility] by registering at the following website: [email or web address]. By joining [employer]’s FollowMyHealth portal, you will also be able to view your health information using other applications (apps) compatible with our system.

## 2021-02-11 NOTE — ED PROVIDER NOTE - OBJECTIVE STATEMENT
PT with SPMHX of recent dx of disk herniation presents to the ED with complaint of acute on chronic lower back pain. Pt states that last night she had a change in her back pain and she has been having Lt sided back pain since then. Pt states that she has sever, constat, sharp pain, that radiates down the back of her leg and to her abd. Pt states that pain is similar to earlier pain but more sever. PT denies fever, chills, numbness tingling, loss of sensation saddle anesthesia, weakness, HE, loss of control of urine, or bowels IVDA. PT with PMHx of recent dx of disk herniation presents to the ED with complaint of acute on chronic lower back pain. Pt states that last night she had a change in her back pain and she has been having Lt sided back pain since then. Pt states that she has sever, constat, sharp pain, that radiates down the back of her leg and to her abd. Pt states that pain is similar to earlier pain but more sever. PT denies fever, chills, numbness tingling, loss of sensation saddle anesthesia, weakness, HE, loss of control of urine, or bowels IVDA.

## 2021-02-11 NOTE — ED PROVIDER NOTE - NSFOLLOWUPINSTRUCTIONS_ED_ALL_ED_FT
Educación para el paciente: Dolor en la parte baja de la espalda en adultos (Conceptos Básicos)  View in English  Redactado por los médicos y editores de [employer]  ¿Micki preocuparme si tengo dolor en la parte baja de la espalda?  No suponga lo peor. Geeta todo el danisha sufre dolor de espalda en algún momento. El dolor en la parte baja de la espalda puede asustarlo, kulwinder incluso si es intenso, generalmente desaparece por sí solo en algunas semanas. Son muy poco frecuentes los casos que requieren atención de urgencia o cirugía.    Consulte a burnette médico o enfermero si tiene dolor de espalda y, además:    ?Se cayó o sufrió camille lesión en la espalda hace poco    ?Siente las piernas adormecidas o débiles    ?Tiene problemas para controlar la orina o kesha evacuaciones    ?Bajó de peso sin explicación    ?Tiene fiebre o se siente enfermo de cualquier otra forma    ?Brenda esteroides, christopher prednisona, en forma regular    ?Tiene diabetes o un problema de cm que debilita el sistema inmunitario    ?Tiene antecedentes de cáncer u osteoporosis    También debe consultar al médico si:    ?El dolor de espalda es tan grave que no puede hacer tareas simples    ?El dolor de espalda no comienza a mejorar dentro de las 4 semanas    ¿Cómo está formada la espalda?  La espalda está formada por (figura 1):    ?Vértebras – Camille pila de huesos que se apoyan honorio sobre otro christopher camille pila de monedas. Cada honorio de estos huesos tiene un agujero en el centro. Cuando están apilados, los huesos gabbi un tubo hueco que protege la médula botello.    ?Discos – Discos de aspecto gomoso que se ubican entre cada camille de las vértebras y sirven para amortiguar y permitir los movimientos.    ?Médula botello y nervios – La médula botello es la autopista de nervios que conecta el cerebro con el rod del cuerpo. Recorre el interior de las vértebras dentro del conducto raquídeo. Los nervios se ramifican desde la médula botello y pasan entre las vértebras. Desde allí se conectan con los brazos, las piernas y los órganos. (Es por esto que los problemas de espalda pueden causar dolor en las piernas o problemas de vejiga o intestinos).    ?Músculos, tendones y ligamentos – Los músculos, tendones y ligamentos reciben el nombre de "tejidos blandos" de la espalda. Estos tejidos blandos sirven christopher soporte de la espalda y ayudan a mantenerla íntegra.    ¿Cuál es la causa del dolor en la parte baja de la espalda?  El dolor en la parte baja de la espalda puede deberse a muchas cosas. La mayoría de las veces, los médicos desconocen la causa exacta.    Un desgarro en un músculo puede causar dolor de espalda. Con frecuencia, esto es lo que ocurre cuando alguien dice que sintió un "tirón" en la espalda, y se refiere al dolor que comienza súbitamente después de hacer camille actividad física, christopher levantar un objeto pesado o flexionar la espalda.    El dolor de espalda también puede aparecer si usted padece:    ?Discos dañados, protuberantes o lesionados    ?Artritis que afecta las articulaciones de la columna    ?Crecimientos óseos en las vértebras que comprimen los nervios cercanos    ?Vértebras fuera de lugar    ?Estrechamiento del conducto raquídeo    ?Un tumor o camille infección (aunque es muy poco frecuente)    ¿Micki realizarme un estudio de imagen?  La mayoría de las personas no necesitan ningún estudio de imagen, christopher por ejemplo camille radiografía, camille tomografía o camille resonancia magnética nuclear. En la mayoría de los casos, el dolor en la parte baja de la espalda desaparece en algunas semanas. Los médicos por lo general no indican estudios de imagen a menos que existan indicios de un problema inusual.    No se preocupe si burnette médico no le indica un estudio de imagen. Igual puede obtener mucha información sobre burnette dolor con solo revisarlo y hablar con usted.    ¿Cómo puede determinar el médico o enfermero qué me pasa con solo hablar conmigo?  Kesha síntomas aportan mucha información sobre la causa del dolor al médico o enfermero, christopher por ejemplo:    ?Si el dolor comenzó después de hacer algo específico, christopher levantar un objeto pesado o torcer la espalda, es posible que se haya desagarrado un músculo.    ?Si el dolor se extiende desde la espalda a la parte posterior de un muslo, podría indicar que un disco protuberante o lesionado está pellizcando honorio de los nervios que va a burnette pierna.    ?Si el dolor se extiende hasta las dos piernas, podría indicar que tiene un estrechamiento del conducto raquídeo. Lockhart se debe con más frecuencia a formaciones óseas en la columna.    ¿Cómo se trata el dolor de espalda?  La mayoría de las personas que sufren un episodio de dolor en la parte baja de la espalda no tienen ningún problema de cm grave, y pueden probar tratamientos simples, christopher por ejemplo:    ?Mantenerse activos – Lo mejor que puede hacer es mantenerse lo más activo posible, incluso si siente dolor. Las personas con dolor en la parte baja de la espalda se recuperan más rápidamente si se mantienen activas. Si el dolor es patrick, posiblemente deba reposar 1 o 2 días. Sin embargo, es importante que vuelva a caminar y moverse lo antes posible. Si wayne es conveniente que evite levantar objetos pesados y hacer deporte mientras le duela la espalda, trate de seguir con kesha actividades diarias.    ?Calor – A algunas personas les ayuda usar camille almohadilla o manta térmica. Tenga cuidado y evite un ajuste de temperatura muy lauren para no quemarse la piel.    ?Medicinas – En primer lugar, puede probar medicinas para el dolor de venta sin receta. En muchos casos, los médicos sugieren que winnie pruebe con camille medicina antiinflamatoria no esteroidea, o "JEN". Entre las JEN se encuentran el ibuprofeno (ejemplos de nombres comerciales: Advil, Motrin) y el naproxeno (ejemplo de nombre comercial: Aleve). Estas medicinas podrían ser más efectivas que el paracetamol (acetaminofén) (Tylenol) para el dolor de espalda.    Si las medicinas de venta sin receta no son de ayuda, dígaselo a burnette médico o enfermero. En algunos casos, los médicos recetan camille medicina para relajar los músculos (llamada “relajante muscular”). Sin embargo, tenga en cuenta que por lo general los relajantes musculares no se usan en pacientes mayores de 65 años. En las personas mayores, estas medicinas pueden causar efectos secundarios, christopher dificultad para orinar o confusión.    ?Tratamientos para ayudar con los síntomas – Algunos tratamientos podrían ayudarlo a sentirse mejor kiki algún tiempo. Son, entre otros:    •Manipulación de la columna – Es un tratamiento que realiza un quiropráctico, un fisioterapeuta u otro profesional al  o "acomodar" las articulaciones de burnette espalda. Si desea probar esta opción, hable winnie con burnette médico o enfermero.    •Acupuntura – Es un tratamiento que realiza un experto en medicina china tradicional, mediante el cual se insertan pequeñas agujas en el cuerpo para bloquear las señales de dolor.    •Masajes    Si wayne el dolor en la parte baja de la espalda suele desaparecer por sí solo en pocas semanas, algunas personas siguen teniendo dolor por más tiempo. En rhonda stephy, algunos tratamientos adicionales podrían ser, entre otros:    ?Autocuidado – Lockhart consiste en estar atento al dolor. Si wayne es recomendable que repose cuando lo necesite, es importante que se mantenga activo en la mayor medida posible. Aplicar calor y hacer estiramientos suaves, entre otras cosas, también puede ayudar a que se sienta mejor.    ?Fisioterapia – Un fisioterapeuta es un experto en ejercicios que puede enseñarle estiramientos y movimientos para ayudar a fortalecer los músculos. El objetivo es aliviar el dolor kulwinder también ayudarlo a reanudar kesha actividades normales.    Algunos ejercicios pueden ser caminar, nadar o usar camille bicicleta fija. El Jose Chi o el yoga también pueden ayudar a algunas personas con dolor de espalda. Encontrar actividades que disfrute puede ayudarlo a mantenerse activo.    ?Reducción del estrés – A algunas personas las ayuda probar un tratamiento llamado "reducción del estrés basada en técnicas contemplativas", que consiste en asistir a un programa grupal para practicar la relajación y la meditación. Si se siente deprimido o ansioso a causa del dolor de espalda, hable con burnette médico o enfermero. Hay otros tratamientos que pueden ayudar con estos problemas.    Algunas personas se preguntan si las inyecciones pueden ayudar a aliviar el dolor de espalda. En algunos casos, los médicos podrían recomendar camille inyección de camille medicina para adormecer la deya o reducir la inflamación, kulwinder se cronin comprobado que esto solo funciona en situaciones específicas.    Solo en muy pocos casos es necesaria camille cirugía para tratar el dolor de espalda.    ¿Qué puedo hacer para no volver a tener dolor de espalda?  Lo mejor que puede hacer es mantenerse activo. Hacer ejercicios para fortalecer y estirar la espalda puede ser de ayuda. También puede hacer lo siguiente:    ?Aprenda a levantar cosas usando las piernas en lugar de la espalda    ?Evite estar parado o sentado en la misma posición kiki mucho tiempo    Tener dolor de espalda puede causarle temor o frustración, kulwinder quizás le ayude saber que mary estas medidas puede reducir el riesgo de que tenga otro episodio.

## 2021-02-12 LAB
CULTURE RESULTS: SIGNIFICANT CHANGE UP
SPECIMEN SOURCE: SIGNIFICANT CHANGE UP

## 2021-11-18 ENCOUNTER — EMERGENCY (EMERGENCY)
Facility: HOSPITAL | Age: 51
LOS: 1 days | Discharge: HOME | End: 2021-11-18
Attending: STUDENT IN AN ORGANIZED HEALTH CARE EDUCATION/TRAINING PROGRAM
Payer: MEDICAID

## 2021-11-18 VITALS — HEIGHT: 63 IN | WEIGHT: 242.07 LBS

## 2021-11-18 LAB
ALBUMIN SERPL ELPH-MCNC: 4.3 G/DL — SIGNIFICANT CHANGE UP (ref 3.3–5.2)
ALP SERPL-CCNC: 186 U/L — HIGH (ref 40–120)
ALT FLD-CCNC: 27 U/L — SIGNIFICANT CHANGE UP
ANION GAP SERPL CALC-SCNC: 16 MMOL/L — SIGNIFICANT CHANGE UP (ref 5–17)
APTT BLD: 25.5 SEC — LOW (ref 27.5–35.5)
AST SERPL-CCNC: 20 U/L — SIGNIFICANT CHANGE UP
BASOPHILS # BLD AUTO: 0.06 K/UL — SIGNIFICANT CHANGE UP (ref 0–0.2)
BASOPHILS NFR BLD AUTO: 0.5 % — SIGNIFICANT CHANGE UP (ref 0–2)
BILIRUB SERPL-MCNC: <0.2 MG/DL — LOW (ref 0.4–2)
BUN SERPL-MCNC: 17 MG/DL — SIGNIFICANT CHANGE UP (ref 8–20)
CALCIUM SERPL-MCNC: 8.9 MG/DL — SIGNIFICANT CHANGE UP (ref 8.6–10.2)
CHLORIDE SERPL-SCNC: 101 MMOL/L — SIGNIFICANT CHANGE UP (ref 98–107)
CO2 SERPL-SCNC: 21 MMOL/L — LOW (ref 22–29)
CREAT SERPL-MCNC: 0.43 MG/DL — LOW (ref 0.5–1.3)
EOSINOPHIL # BLD AUTO: 0.09 K/UL — SIGNIFICANT CHANGE UP (ref 0–0.5)
EOSINOPHIL NFR BLD AUTO: 0.7 % — SIGNIFICANT CHANGE UP (ref 0–6)
ETHANOL SERPL-MCNC: <10 MG/DL — SIGNIFICANT CHANGE UP (ref 0–9)
GLUCOSE SERPL-MCNC: 239 MG/DL — HIGH (ref 70–99)
HCT VFR BLD CALC: 41.6 % — SIGNIFICANT CHANGE UP (ref 34.5–45)
HGB BLD-MCNC: 13.3 G/DL — SIGNIFICANT CHANGE UP (ref 11.5–15.5)
IMM GRANULOCYTES NFR BLD AUTO: 0.6 % — SIGNIFICANT CHANGE UP (ref 0–1.5)
INR BLD: 1.02 RATIO — SIGNIFICANT CHANGE UP (ref 0.88–1.16)
LYMPHOCYTES # BLD AUTO: 24 % — SIGNIFICANT CHANGE UP (ref 13–44)
LYMPHOCYTES # BLD AUTO: 3 K/UL — SIGNIFICANT CHANGE UP (ref 1–3.3)
MCHC RBC-ENTMCNC: 28.4 PG — SIGNIFICANT CHANGE UP (ref 27–34)
MCHC RBC-ENTMCNC: 32 GM/DL — SIGNIFICANT CHANGE UP (ref 32–36)
MCV RBC AUTO: 88.9 FL — SIGNIFICANT CHANGE UP (ref 80–100)
MONOCYTES # BLD AUTO: 0.7 K/UL — SIGNIFICANT CHANGE UP (ref 0–0.9)
MONOCYTES NFR BLD AUTO: 5.6 % — SIGNIFICANT CHANGE UP (ref 2–14)
NEUTROPHILS # BLD AUTO: 8.6 K/UL — HIGH (ref 1.8–7.4)
NEUTROPHILS NFR BLD AUTO: 68.6 % — SIGNIFICANT CHANGE UP (ref 43–77)
PLATELET # BLD AUTO: 290 K/UL — SIGNIFICANT CHANGE UP (ref 150–400)
POTASSIUM SERPL-MCNC: 3.9 MMOL/L — SIGNIFICANT CHANGE UP (ref 3.5–5.3)
POTASSIUM SERPL-SCNC: 3.9 MMOL/L — SIGNIFICANT CHANGE UP (ref 3.5–5.3)
PROT SERPL-MCNC: 7.9 G/DL — SIGNIFICANT CHANGE UP (ref 6.6–8.7)
PROTHROM AB SERPL-ACNC: 11.8 SEC — SIGNIFICANT CHANGE UP (ref 10.6–13.6)
RBC # BLD: 4.68 M/UL — SIGNIFICANT CHANGE UP (ref 3.8–5.2)
RBC # FLD: 12.4 % — SIGNIFICANT CHANGE UP (ref 10.3–14.5)
SODIUM SERPL-SCNC: 138 MMOL/L — SIGNIFICANT CHANGE UP (ref 135–145)
TROPONIN T SERPL-MCNC: <0.01 NG/ML — SIGNIFICANT CHANGE UP (ref 0–0.06)
WBC # BLD: 12.52 K/UL — HIGH (ref 3.8–10.5)
WBC # FLD AUTO: 12.52 K/UL — HIGH (ref 3.8–10.5)

## 2021-11-18 PROCEDURE — 93010 ELECTROCARDIOGRAM REPORT: CPT

## 2021-11-18 PROCEDURE — 70496 CT ANGIOGRAPHY HEAD: CPT | Mod: 26,MA

## 2021-11-18 PROCEDURE — 99232 SBSQ HOSP IP/OBS MODERATE 35: CPT

## 2021-11-18 PROCEDURE — 0042T: CPT

## 2021-11-18 PROCEDURE — 99220: CPT

## 2021-11-18 PROCEDURE — 70498 CT ANGIOGRAPHY NECK: CPT | Mod: 26,MA

## 2021-11-18 RX ORDER — LISINOPRIL 2.5 MG/1
5 TABLET ORAL DAILY
Refills: 0 | Status: DISCONTINUED | OUTPATIENT
Start: 2021-11-18 | End: 2021-11-23

## 2021-11-18 RX ORDER — ONDANSETRON 8 MG/1
4 TABLET, FILM COATED ORAL ONCE
Refills: 0 | Status: COMPLETED | OUTPATIENT
Start: 2021-11-18 | End: 2021-11-18

## 2021-11-18 RX ORDER — PANTOPRAZOLE SODIUM 20 MG/1
40 TABLET, DELAYED RELEASE ORAL
Refills: 0 | Status: DISCONTINUED | OUTPATIENT
Start: 2021-11-18 | End: 2021-11-23

## 2021-11-18 RX ORDER — METFORMIN HYDROCHLORIDE 850 MG/1
500 TABLET ORAL DAILY
Refills: 0 | Status: DISCONTINUED | OUTPATIENT
Start: 2021-11-18 | End: 2021-11-23

## 2021-11-18 RX ORDER — VENLAFAXINE HCL 75 MG
75 CAPSULE, EXT RELEASE 24 HR ORAL DAILY
Refills: 0 | Status: DISCONTINUED | OUTPATIENT
Start: 2021-11-18 | End: 2021-11-23

## 2021-11-18 RX ORDER — MORPHINE SULFATE 50 MG/1
4 CAPSULE, EXTENDED RELEASE ORAL ONCE
Refills: 0 | Status: DISCONTINUED | OUTPATIENT
Start: 2021-11-18 | End: 2021-11-18

## 2021-11-18 RX ORDER — ALTEPLASE 100 MG
9 KIT INTRAVENOUS ONCE
Refills: 0 | Status: DISCONTINUED | OUTPATIENT
Start: 2021-11-18 | End: 2021-11-18

## 2021-11-18 RX ORDER — ALTEPLASE 100 MG
81 KIT INTRAVENOUS ONCE
Refills: 0 | Status: DISCONTINUED | OUTPATIENT
Start: 2021-11-18 | End: 2021-11-18

## 2021-11-18 RX ORDER — METOCLOPRAMIDE HCL 10 MG
10 TABLET ORAL ONCE
Refills: 0 | Status: COMPLETED | OUTPATIENT
Start: 2021-11-18 | End: 2021-11-18

## 2021-11-18 RX ADMIN — Medication 10 MILLIGRAM(S): at 16:47

## 2021-11-18 NOTE — ED PROVIDER NOTE - PROGRESS NOTE DETAILS
Pt seen ambulating to the bathroom unassisted with steady gait. Reviewed all results with pt as well as plans for observation admission. Pt is comfortable with plan for obs stay. Questions answered.. - Adalberto Yost, PGY-3

## 2021-11-18 NOTE — ED ADULT NURSE NOTE - NSICDXFAMILYHX_GEN_ALL_CORE_FT
FAMILY HISTORY:  Family history of hypertension in mother, she is alive 85 yo    Father  Still living? Unknown  Family history of coronary artery disease, Age at diagnosis: Age Unknown

## 2021-11-18 NOTE — CONSULT NOTE ADULT - SUBJECTIVE AND OBJECTIVE BOX
NI[facility] SS:   Date:   Time:   1a) Level of consciousness (0-3):   1b) Questions (0-2):   1c) Commands (0-2):   2  ) Gaze (0-2):   3  ) Visual field (0-3):   4  ) Facial palsy (0-3):   Motor  5a) Left arm (0-4):   5b) Right arm (0-4):   6a) Left leg (0-4):   6b) Right leg (0-4):   7  ) Ataxia (0-2):   Sensory  8  ) Sensory (0-2):   Speech  9  ) Language (0-3):   10) Dysarthria (0-2):   Extinction  11) Extinction/inattention (0-2):     Total score:     Prestroke Modified [place]:     (0: No symptoms and no disability.  1: No significant disability despite symptoms; able to carry out all usual duties and activities.  2: Slight disability; unable to carry out all previous activity but able to look after own affairs without assistance.  3: Moderate disability; requiring some help but able to walk without assistance.   4: Moderately severe disability; unable to walk without assistance and unable to attend to own bodily needs without assistance.  5: Severe disability; bedridden, incontinent and requiring constant nursing care and attention.   6: Dead. )     Neurology consult    YANICK JONES 51y Female       HPI:  Patient is a 51y old  Female who is primarily Malaysian speaking. Swedish interpretor services were utilized for this evalaution.  Patient reports she woke up at about 5:30 am and developed a headache soon after. Later on at  1 PM she reports that she developed dizziness with nausea and double vision.  In the ED she was noticed to have left sided weakness and code stroke was activated.          PMH: HTN (hypertension)    HLD (hyperlipidemia)    Diabetes    Prior right cerebellar lacunar stroke.     PSH: No significant past surgical history    FAMILY HISTORY:  Family history of hypertension in mother  she is alive 83 yo    Family history of coronary artery disease (Father)  father  at age 64        SOCIAL HISTORY:  No history of tobacco or alcohol use     Allergies    No Known Allergies    Intolerances        Height (cm): 160 ( @ 16:20)  Weight (kg): 109.8 ( @ 17:02)  BMI (kg/m2): 42.9 ( @ 17:02)    Vital Signs Last 24 Hrs  T(C): 36.7 (2021 17:00), Max: 36.7 (2021 17:00)  T(F): 98 (2021 17:00), Max: 98 (2021 17:00)  HR: 81 (2021 17:00) (81 - 81)  BP: 156/81 (2021 17:00) (156/81 - 156/81)  BP(mean): --  RR: 17 (2021 17:00) (17 - 17)  SpO2: 100% (2021 17:00) (100% - 100%)  MEDICATIONS          LABS:  CBC Full  -  ( 2021 16:22 )  WBC Count : 12.52 K/uL  RBC Count : 4.68 M/uL  Hemoglobin : 13.3 g/dL  Hematocrit : 41.6 %  Platelet Count - Automated : 290 K/uL  Mean Cell Volume : 88.9 fl  Mean Cell Hemoglobin : 28.4 pg  Mean Cell Hemoglobin Concentration : 32.0 gm/dL  Auto Neutrophil # : 8.60 K/uL  Auto Lymphocyte # : 3.00 K/uL  Auto Monocyte # : 0.70 K/uL  Auto Eosinophil # : 0.09 K/uL  Auto Basophil # : 0.06 K/uL  Auto Neutrophil % : 68.6 %  Auto Lymphocyte % : 24.0 %  Auto Monocyte % : 5.6 %  Auto Eosinophil % : 0.7 %  Auto Basophil % : 0.5 %          138  |  101  |  17.0  ----------------------------<  239<H>  3.9   |  21.0<L>  |  0.43<L>    Ca    8.9      2021 16:22    TPro  7.9  /  Alb  4.3  /  TBili  <0.2<L>  /  DBili  x   /  AST  20  /  ALT  27  /  AlkPhos  186<H>      LIVER FUNCTIONS - ( 2021 16:22 )  Alb: 4.3 g/dL / Pro: 7.9 g/dL / ALK PHOS: 186 U/L / ALT: 27 U/L / AST: 20 U/L / GGT: x           Hemoglobin A1C:       PT/INR - ( 2021 16:22 )   PT: 11.8 sec;   INR: 1.02 ratio         PTT - ( 2021 16:22 )  PTT:25.5 sec      On Neurological Examination:  Patients exam fluctated. On my initial exam she seemed to have significant left sided weakness and was not antigravity on left but was 5/5 on the right. Subsequently her exam improved to NIH of 0.  Mental Status - Patient is alert, awake, oriented X3. fluent, names and repeats correctly  There is no dysarthria, no aphasia, follows commands well and able to answer questions appropriately.     Cranial Nerves - PERRL, EOMI,  Visual fields are full to finger counting, no gross facial asymmetry, tongue/uvula midline    Motor Exam -   Right upper ---5/5 No drift  Left upper ---4+/5  but no drift seen.  Right lower ---5/5 No drift  Left lower  ---5-/5 But no drift     nml bulk/tone    Sensory    Intact to light touch and pinprick bilaterally    Coord: FTN intact bilaterally     Gait -  Not assessed.     Reflexes:                         Patient was last seen well at 1300  Patient was seen by me at 1615  CT head was read by me at 1630       [facility] SS:   Date: 2021  Time: 1635  1a) Level of consciousness (0-3):   1b) Questions (0-2):   1c) Commands (0-2):   2  ) Gaze (0-2):   3  ) Visual field (0-3):   4  ) Facial palsy (0-3):   Motor  5a) Left arm (0-4):   5b) Right arm (0-4):   6a) Left leg (0-4):   6b) Right leg (0-4):   7  ) Ataxia (0-2):   Sensory  8  ) Sensory (0-2):   Speech  9  ) Language (0-3):   10) Dysarthria (0-2):   Extinction  11) Extinction/inattention (0-2):     Total score: = 0    Prestroke Modified Tony: 0          GENERAL Exam:     Nontoxic , No Acute Distress   	  HEENT:  normocephalic, atraumatic  		  LUNGS:	Clear bilaterally  No Wheeze  Decreased bilaterally  	  HEART:	 Normal S1S2   No murmur RRR        	  GI/ ABDOMEN:  Soft  Non tender    EXTREMITIES:   No Edema  No Clubbing  No Cyanosis No Edema    MUSCULOSKELETAL: Normal Range of Motion  	   SKIN:      Normal   No Ecchymosis               RADIOLOGY  CTH   1.  No CT evidence of acuteintracranial hemorrhage, territorial infarction or mass effect. If patient has persistent symptoms, new focal neurologic deficits or high clinical suspicion for acute cerebral ischemia, consider further evaluation with MRI as it is a more sensitive modality.    2.  Partial right mastoid/middle ear cavity effusion, nonspecific but correlate clinically for otomastoiditis.      CTA brain:  1. No proximal large vessel arterial occlusion, flow-limiting stenosis, dissection or arteriovenous malformation within the brain. Consider further evaluation with MRI if there is persistent clinical suspicion for evolving acute cerebral infarction.  2. 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    CTA neck:  1. No arterial occlusion, flow-limiting stenosis, dissection or pseudoaneurysm within the neck.  2. ill-defined biapical groundglass opacities, nonspecific but likely due to expiratory phase imaging versus less likely infection/inflammation. Consider dedicated chest imaging as clinically warranted.    CT perfusion:  1. No evidence for acute core ischemic or areas of critically hypoperfused tissue at risk    Initial noncontrast and CTA/CTP findings were discussed with Dr. Adalberto Yost via telephone on 2021 at 4:47 PM with verbal read back.    MRI:  TTE:

## 2021-11-18 NOTE — ED PROVIDER NOTE - PHYSICAL EXAMINATION
Reason For Visit  Verona Ravi is a 67year old right handed female patient with a chief complaint of dementia, behaviors . :  services not used. A chaperone is not applicable. She is accompanied by her adult child son Sabi Juarez. Referred By: Dr. Elliott Beltran   Fax #:. [phone]. Quality    Communication CI communication of results to PCP: Dr. Elliott Beltran. Smoking Cessation CI no tobacco use and did not provide intervention and counseling in regards to tobacco use. History of Present Illness  68 yo RH [facility] woman with a history of hyperlipidemia, hyperparathyroidism, hypothyroidism, osteoarthritis, and long standing DELUSIONS, late onset, age> 76 , abnormal NP testing 2014 for mild cognitive deficits in visuoconstructional and visual attention tasks, and concern for paraphrenia. Abnormal FDG brain PET scan would be concerning for a neurodegenerative process, Alzheimer's dementia (3.28.18). Currently main concern with anger and irritability, challenges in executive brain functions.     had visitors from [facility] and was busy shopping    was bit by a wasp x 2    she feels better, she thinks the citalopram is helping her     per son citalopram 10 mg took the edge off     gets agitated when son checks mail as she misplaces paperwork due to poor organization     son notes mainly problem with time management, she can not prioritize, she can not multitask, she does not accomplish things when she starts she obsesses about things     she denies problems     she insists on taking medications regularly, even though son noted that she forgets to take medications, she still has medication from [facility]     son would like patient to take her medications regularly. Cognitive   Not knowing date/time: no (). Not knowing location: no ().    change in short-term memory: yes (occ). Difficulty remembering recent events: yes (occ). difficulty with word finding: yes (freq).     Forgetting names: yes (freq). Misplacing/losing items: yes (freq). Repeating stories/questions: yes (occ). Getting lost in familiar place: no (). Difficulty following a conversation or TV program: yes (occ). difficulty following instructions: yes (freq). Difficulty with basic arithmetic: yes (occ). Poor judgement (dangerous actions, excess spending, etc.): yes (rare). Behavior   Delusions: occasional.   Hallucinations: none. Agitation/Aggression: occasional.   Depression: occasional.   Anxiety: occasional.   Elation/Euphoria: none. Apathy: rare. Disinhibition: rare. Irritability: frequent. Motor Behavior: rare. Sleep: rare. Appetite: rare. Instrumental ADL's   Driving: no.   Public Transportation: Not performing. Shopping: Independent. Household Chores: Assisted. Cooking: Independent. Paying bills: Assisted, son helps with filing. Medication management: Independent. Telephone: Independent. Laundry: Independent. Socializing: Assisted. Personal ADL's   Dressing: Independent. Bathing: Independent. Personal Grooming: Independent. Toileting: Independent. Eating: Independent. Transferring: No falls. Caregiver Stress   Caregiver Stress is: moderate      Review of Systems    Neuro: memory lapses or loss, but no loss of balance and no falls. Psych: anxiety, depression and delusions . anger and irritability. All other systems reviewed and negative. Current Meds   1. Biotin 5 MG Oral Tablet; TAKE 1 TABLET BY MOUTH DAILY AS DIRECTED; Therapy: (Recorded:[id number]) to Recorded   2. Citalopram Hydrobromide 10 MG Oral Tablet; TAKE 1 TABLET EVERY MORNING; Therapy: [id number] to (Frank Watt)  Requested for: 81FEH3792; Last   Rx:[id number] Ordered   3. Citracal + D TABS; 1-2 TABLETS DAILY; Therapy: [id number] to  Requested for: 21Mar2018 Recorded   4. Donepezil HCl - 5 MG Oral Tablet; TAKE 1 TABLET EVERY MORNING;    Therapy: [id number] to RoseanneLatosha Greeryarelisloren)  Requested for: 87NTX4656; Last   Rx:[id number] Ordered   5. Levothyroxine Sodium 75 MCG Oral Tablet; TAKE ONE TABLET BY MOUTH DAILY; Therapy: 00FOO9173 to (Evaluate:14Jan2018) Recorded   6. Nitrofurantoin Monohyd Macro 100 MG Oral Capsule; TAKE 1 CAPSULE EVERY 12   HOURS DAILY; Therapy: 71GSX3982 to (Evaluate:97Fdq1080)  Requested for: 46Nya8612; Last   Rx:[id number] Ordered   7. Vitamin D 2000 UNIT Oral Capsule; TAKE 1 CAPSULE DAILY; Therapy: 96HOL9500 to Recorded    Active Problems   1. Acute pharyngitis (J02.9)   resolved   2. Anxiety (F41.9)   3. Asymptomatic varicose veins (I83.90)   4. Behavioral change (R46.89)   5. History of Bladder Surgery   Â· 1995- bladder suspension   6. Carpal tunnel syndrome (G56.00)   7. Cystocele   8. Delusions (F22)   9. Dementia (F03.90)   10. Depression (F32.9)   11. Dermatitis (L30.9)   12. Diverticulosis (K57.90)   13. Dysuria (R30.0)   14. Essential hypertension (I10)   15. Foreign Body Sensation In Eyes   16. Hyperlipidemia (E78.5)   17. Hyperlipidemia (E78.5)   18. Hyperparathyroidism (E21.3)   19. Hypothyroidism (E03.9)   20. Hypothyroidism (E03.9)   21. History of Hysterectomy   Â· -1 ovary removed   22. Internal hemorrhoids (K64.8)   23. Irritability and anger (R45.4)   24. Joint Pain, Localized In The Knee   25. Memory loss (R41.3)   26. Multinodular non-toxic goiter (E04.2)   27. Need for immunization against influenza (Z23)   28. Neurodegenerative dementia (F03.90)   29. Osteoarthritis (arthritis due to wear and tear of joints) (M19.90)   30. Osteoporosis (M81.0)   31. Osteoporosis, senile (M81.0)   32. Paraphrenia (F22)   33. Pelvic pain in female (R10.2)   34. Postmenopausal atrophic vaginitis (N95.2)   Â· juanatoclari , recommend kegel exercise   35. Preop examination (Z01.818)   36. Prolapse of female pelvic organs (N81.9)   37. Prolapse of vaginal wall (N81.10)   38. Rash (R21)   39. Stress incontinence, female (N39.3)   40.  Subcutaneous nodules (R22.9)   1 nocule behind left ear   41. Urethral prolapse (N36.8)   42. Urinary tract infection (N39.0)   43. Urinary tract infection symptoms (R39.9)   44. Visit for screening mammogram (Z12.31)   45. Vitamin D deficiency (E55.9)    Past Medical History   1. History of hypothyroidism (Z86.39)   2. Need for immunization against influenza (Z23)    Surgical History   1. History of Bladder Surgery   Â· - bladder suspension   2. History of Hysterectomy   Â· -1 ovary removed   3. History of Varicose Vein Ligation    Family History  Family History    1. Denied: Family history of Breast Cancer   2. Family history of Cancer   3. Denied: Family history of Colon Cancer   4. Family history of Dementia   Â·  old age 80   10. Family history of hypertension (Z82.49)   6. Family history of Heart Disease   7. Family history of Psoriasis   8.  Family history of Stroke syndrome   Â· father age 70  age 80    Social History   Â· Activities of daily living (ADL's), independent   Â· Denied: Alcohol   Â· Denied: Being A Social Drinker   Â· Current diet nutritional quality   Â· Last Impression: 2015  LOW NUTRITIONAL RISK  Caretha Pulse   Â· Exercise habits   Â· Last Impression: 2015  WALKS WEATHER PERMITTING  Caretha Pulse   Â· Feels safe at home   Â· Functional status   Â· Last Impression: 2015  DOES NOT DRIVE  RORY Erinn Gutierres Good dental hygiene   Â· Home safety plan   Â· Living environment   Â· Last Impression: 2015  [address] [facility], FEELS SECURE AND      SUPPORTED  Caretha Pulse   Â·    Â· Never smoker   Â· Retired   Â· Last Impression: 2015    Caretha Pulse   Â· Denied: Tobacco Use    Vitals  Signs   Recorded: [id number] 03:48PM   Height: 5 ft 2 in  Weight: 165 lb 12.48 oz  BMI Calculated: 30.32  BSA Calculated: 9.26  Systolic: 620, LUE, Sitting  Diastolic: 84, LUE, Sitting  Heart Rate: 68    Testing Scores  Test Scores    [id number] [id number] 31BXK4525   MMSE 25 25 24   MoCA   19   Geriatric Gen: no acute distress  Head: normocephalic, atraumatic  Lung: CTAB, no respiratory distress, no wheezing, rales, rhonchi  CV: normal s1/s2, rrr,   Abd: soft, no tenderness, non-distended  MSK: No edema, no visible deformities, full range of motion in all 4 extremities  Neuro: 2/5 strength to LLE, 5/5 strength to all other extremities, sensation intact, CN 2-12 intact   Skin: No rash   Psych: normal affect Depression Scale 0  0     Geriatric Depression Scale: 0     MMSE:   - Orientation Date: 5   - Orientation Place: 4   - Registration: 3   - Serial 7: 3   - Recall: 2   - Namin   - Repetition: 0   - Command: 3   - Readin   - Writin   - Copyin   Total: 25      Physical Exam    GENERAL EXAM  Constitutional: No acute distress, well appearing. Cardiovascular: Auscultation of heart: no murmur. Examination of carotid arteries: no bruits. NEUROLOGIC EXAM    MENTAL STATUS  see testing scores  insight poor  denial  able to provide personal information  able to relay recent events   argumentative with son   Praxis: no apraxia   No aphasia  no neglect     CRANIAL NERVES  II: Pupils equal and reactive to light. III,IV, VI: Extraocular movements full. V: Facial sensation normal.  VII: Facial sensation and symmetry normal.  VIII: hearing normal.  IX, X: Palate elevation normal.  XI: SCM and trapezius strength: normal.  XII: tongue protruded in midline. MUSCULOSKELETAL EXAM  Muscle tone in upper and lower extremities: normal.  Muscle strength in upper and lower extremities 5/5. COORDINATION  Finger to Nose normal bilaterally, no ataxia. Extrapyramidal: no bradykinesia, no bradyphrenia, no facial masking, no rare blinking. Tremor: no tremor    REFLEXES  UE:Biceps,Triceps, Brachioradialis: bilaterally L 2+, R 1+  LE:Patella bilaterally 2+ , Ankle Jerk: bilaterally 0    GAIT AND STATION  Gait: normal, steady,   Freezing of gait: absent  Postural instability on a pull test: absent  Romberg: negative  Tandem: abnormal        Results/Data      Neuropsychological testing dr. Hugo Cole-- relatively well retained cognitive abilities, except visual retention and construction. Suspected late life paraphrenia or delusional disorder.    [id number] 03:10PM   MRI BRAIN WO/W CON   MRI BRAIN WO/W CON: Accession #    [id number]    EXAM: MRI BRAIN WO/W CON    CLINICAL INDICATION: Memory loss, behavioral change    COMPARISON:  06/17/2014    TECHNIQUE: Sagittal, axial and coronal sequences were performed through the brain pre and post   intravenous gadolinium administration. FINDINGS: The ventricles are normal in size. There is age-appropriate cerebral atrophy and   mild ventriculomegaly. There is no acute/subacute infarction. There are no chronic cortical   infarctions. There is a mild degree of chronic supratentorial microvascular ischemic change. There is a single chronic microbleed within the right inferior frontal gyrus that was   present previously. There is no acute hemorrhage. There is no intracranial mass or region   of pathologic contrast enhancement. There is a punctate chronic infarction within the right   cerebellum that was not present previously. There is a small chronic infarction involving the   left lateral cerebellum that was present previously. The brainstem and craniocervical junction   are normal.  The pituitary gland is normal in appearance. Normal flow-voids are present   within the vertebral arteries, basilar artery and carotid arteries. The paranasal sinuses are   essentially clear. IMPRESSION:    No acute abnormality. A punctate right cerebellar chronic infarction has occurred in the interim   since 06/17/2014. Otherwise, there is no change. ****  F I N A L  ****    Transcribed By: VERNON   02/17/18 3:24 pm    Dictated By:            Ritika Reynolds    Electronically Reviewed and Approved By:           Ritika Reynolds  02/17/18 3:36 pm   [id number] 10:42AM   PET BRAIN ALZHEIMER'S   PET BRAIN ALZHEIMERS: Accession #    [id number]    PET CT brain study. Isotope: 15.51 mCi of F-18 FDG. INDICATION: 76-year-old female with dementia for pet CT brain evaluation. PROCEDURE: 45 minutes after the intravenous administration of 15.51 mCi of F-18 FDG to the right   antecubital vein, PET and CT images of the brain were obtained.   The low-dose noncontrast CT is   for anatomical correlation and attenuation correction purposes only. The serum glucose at the   time of isotope administration is 83 mg/dL. FINDINGS: The noncontrast CT images of the brain appear to be unremarkable. No evidence of any   mass lesions or midline shift of structures. The PET images show very subtle decreased FDG activity in the upper posterior parietal cortical   regions bilaterally. Very subtle decreased FDG activity in the lateral temporal cortical region   on the left side. Findings can raise the possibility for minimal cognitive impairment. A   repeat PET/CT brain study in 12-18 months can be of value. Otherwise the isotope activity in   the remaining cerebral cortical and cerebellar tissues appear to be grossly normal.    IMPRESSION:    1. Very subtle decreased FDG activity in the bilateral upper posterior parietal occipital and   left temporal cortical regions, may raise the suspicion for minimal cognitive impairment. Suggest a follow-up in 12-18 months if necessary. ****  F I N A L  ****    Transcribed By: VERNON   03/28/18 11:24 am    Dictated By:            Reanna Ordonez    Electronically Reviewed and Approved By:           Reanna Ordonez  03/29/18 10:10 am   Assessment   1. Irritability and anger (R45.4)   2. Dementia (F03.90)   3. Neurodegenerative dementia (F03.90)    Impression  68 yo RH [facility] woman with a history of hyperlipidemia, hyperparathyroidism, hypothyroidism, osteoarthritis, and long standing DELUSIONS, late onset, age> 76 , abnormal NP testing 2014 for mild cognitive deficits in visuoconstructional and visual attention tasks, and concern for paraphrenia. Abnormal FDG brain PET scan would be concerning for a neurodegenerative process, Alzheimer's dementia (3.28.18). Currently main concern with anger and irritability, argumentative, challenges in executive brain functions (difficult to multitask and prioritize) and denial. Frontal presentation of AD likely. Challenges in care rare elate to denial and refusing to increase assistance with mail and medication. Citalopram helped some. , but better medication compliance, likely would help with behaviors. Plan   Â· [address] TEST; Status:Active; Requested for:54Tjg0414;   TEMP (ETEMP) : n  NAME (ETSTN) : n  LAB ORDER CODE (ERCOD) : n  LAB (EREFL) : n   Â· Citalopram Hydrobromide 20 MG Oral Tablet; TAKE 1 TABLET DAILY         Other Recommendations:     continue donepezil 5 mg a day    increase citalopram 20 mg a day     supervise medication    supervise bills     caregiver stress in in part related to denial and refusal of help    Return to Clinic   6 months. Time    Total face to face time spent with patient 30 minutes. Greater than 50% of the total time was spent counseling and/or coordinating care. Signatures   Electronically signed by :  Margarito William, ; Oct 17 2018  3:53PM CST (Acknowledgement)    Electronically signed by : Roque Cabrales MD; Oct 17 2018 10:54PM CST (Author)

## 2021-11-18 NOTE — ED CDU PROVIDER INITIAL DAY NOTE - NS ED ROS FT
CONSTITUTIONAL: No fevers, no chills  Eyes: No vision changes  Cardiovascular: No Chest pain  Respiratory: No SOB  Gastrointestinal: +n/v, n oc/d, no abd pain  Genitourinary: no dysuria, no hematuria  SKIN: no rashes.  MSK: no weakness, no myalgias, no arthralgias  NEURO: +headache, +weakness, no numbness  PSYCHIATRIC: no SI/HI

## 2021-11-18 NOTE — ED CDU PROVIDER INITIAL DAY NOTE - MEDICAL DECISION MAKING DETAILS
Pt presenting as code stroke with onset of headache, neck pain, and back pain with lkw 0500 today and then onset of dizziness and n/v at 1200. Pt also with focal L leg weakness with unknown time of onset. CTH, CTA h/n, CTP ordered. Labs, trop, ekg, coags, t/s unremarkable, will get MR

## 2021-11-18 NOTE — ED ADULT TRIAGE NOTE - HEIGHT IN FEET
*[facility]*  *[facility]*  [address] [place] [address] [id number] [phone]  Transesophageal Echocardiogram (BHUMIKA)  Patient: Lobito Howell  Study Date/Time:  2019 10:48AM  MRN:     [id number]         FIN#:             [id number]  :     1973        Ht/Wt:  Age:     46                BSA/BMI:  Gender:  M                 Baseline BP:      105 / 92  Ordering Physician:   Monserrat Esquivel  Referring Physician:  Staff Bianca Huitron Staff Cnd  Attending Physician:  Dax Decker  Diagnostic Physician: Angel Cortez MD  Sonographer:          Carlita Marcelo RDCS  -------------------------------------------------------------------  INDICATIONS:   Endocarditis.  -------------------------------------------------------------------  STUDY CONCLUSIONS  SUMMARY:  1. Left ventricle: The cavity size is normal. Wall thickness is     normal.  2. Aortic valve: There is a medium-sized, 1.4cm (L) x 0.9cm (W),     mobile vegetation on the left ventricular aspect of the right     coronary cusp. Mild to moderate regurgitation.  -------------------------------------------------------------------  STUDY DATA:   Procedure:  Initial setup. Surface ECG leads, blood  pressure measurements, and pulse oximetric signals were monitored.  Time out performed. Sedation. Moderate sedation was administered by  the performing cardiologist, Sedation was started at 11:00.  Sedation was ended at 11:02. Transesophageal echocardiography. A  transesophageal probe was inserted by the attending cardiologist.  Image quality was good. The transesophageal probe was removed.  2D,  Doppler, intravenous contrast injection, and image interpretation  of data obtained by other staff.  Study status:  Routine.  Consent:  The risks, benefits, and alternatives to the procedure were  explained and informed consent was obtained.  Study completion:  The patient tolerated the procedure well. There were  no  complications.  FINDINGS  LEFT VENTRICLE:  The cavity size is normal. Wall thickness is  normal. Systolic function is normal.  AORTIC VALVE:  The annulus is normal-sized. trileaflet There is a  medium-sized, 1.4cm (L) x 0.9cm (W), mobile vegetation on the left  ventricular aspect of the right coronary cusp.  Doppler:   Mild to  moderate regurgitation.  AORTA:  There is mild atheromatous plaque.  MITRAL VALVE:   Structurally normal valve.   Leaflet separation is  normal.  There is no evidence of a vegetation.  Doppler:   Trivial  regurgitation.  ATRIAL SEPTUM:  No defect or patent foramen ovale is identified.  Echo contrast study following an increase in RA pressure induced by  provocative maneuvers, shows no right-to-left shunt.  LEFT ATRIUM:  The atrium is normal in size.  There is no evidence  of a thrombus in the atrial cavity or appendage. The appendage is  of normal size. Emptying velocity is normal.  RIGHT VENTRICLE:  The cavity size is normal. Wall thickness is  normal. Systolic function is normal.  PULMONIC VALVE:    Structurally normal valve.   Cusp separation is  normal.  There is no evidence of a vegetation.  TRICUSPID VALVE:   Structurally normal valve.   Leaflet separation  is normal.  There is no evidence of a vegetation.  Doppler:   Mild  regurgitation.  RIGHT ATRIUM:  The atrium is normal in size.  There is no evidence  of a thrombus in the atrial cavity or appendage.  PERICARDIUM:  The pericardium is normal in appearance.  -------------------------------------------------------------------  Measurements   Aortic valve      Value   AR ED v           2.25  m/s   AR decel          661   cm/s^2   AR PHT            100   ms   AR ED grad        20    mm Hg  Legend:  (L)  and  (H)  aurora values outside specified reference range.  Prepared and electronically signed by  Angel Cortez MD  07/24/2019 12:15   5

## 2021-11-18 NOTE — ED CDU PROVIDER INITIAL DAY NOTE - ATTENDING CONTRIBUTION TO CARE
52 y/o F pt with hx of htn, hld, dm,  CVA presenting today for evaluation of dizziness at 0500 today and also  headache, neck pain, back pain. plan tia terry

## 2021-11-18 NOTE — ED PROVIDER NOTE - CLINICAL SUMMARY MEDICAL DECISION MAKING FREE TEXT BOX
Pt presenting as code stroke with onset of headache, neck pain, and back pain with lkw 0500 today and then onset of dizziness and n/v at 1200. Pt also with focal L leg weakness with unknown time of onset. CTH, CTA h/n, CTP ordered. Labs, trop, ekg, coags, t/s ordered will reeval closely.

## 2021-11-18 NOTE — ED ADULT TRIAGE NOTE - CHIEF COMPLAINT QUOTE
biba for altered mental status, patient states she has headache. last known well 9 am. denies fever/cough chills. in transport with ems heart rate went to 20 BPM patient directed to critical care md at bedside at 1610

## 2021-11-18 NOTE — ED PROVIDER NOTE - NSICDXFAMILYHX_GEN_ALL_CORE_FT
FAMILY HISTORY:  Family history of hypertension in mother, she is alive 83 yo    Father  Still living? Unknown  Family history of coronary artery disease, Age at diagnosis: Age Unknown

## 2021-11-18 NOTE — ED PROVIDER NOTE - OBJECTIVE STATEMENT
50 y/o F pt with hx of htn, hld, dm, depression, chronic low back pain, CVA presenting today for evaluation of dizziness. States that at 0500 today she began to develop headache, neck pain, back pain. Around noon developed dizziness, n/v which has persisted. Code stroke called up pt arrival. Pt is a remarkably poor historian. States that her L leg is weak, does not know when this started. Pt denies any dyspnea, fevers, n/v/d, abdominal pain, dysuria, cough, congestion, sore throat, numbness, tingling, syncope, or other complaint.

## 2021-11-18 NOTE — ED PROVIDER NOTE - ATTENDING CONTRIBUTION TO CARE
50 y/o F pt with hx of htn, hld, dm, depression, chronic low back pain, CVA here with multiple symptoms. Woke in morning with mild headache. Here complaining of chest pain, vomiting, low back pain and headache. on exam patient found to have left leg weakness. patient was not aware of weakness. in CT scan, patient seen using left leg moving to CT scan without difficulty. Seen by stroke attending Dr. Child after and had possible left arm weakness that TPA was considered for but not given as patient symptoms resolved.  patient with multiple symptoms. on several rpt exam patient with NIH 0. ambulated to bathroom without difficulty. neuro recommending obs for MR

## 2021-11-18 NOTE — CONSULT NOTE ADULT - ASSESSMENT
IMPRESSION:  - R/O Posterior circulation stroke or TIA.  - R/O Complex migraine    ASSESSMENT/ PLAN:     - Admit to inpatient hospitalist service.  - Neuro checks and vital signs Q 2 hours.  - SBP goal permissive upto 220/120 for 48 hours.  - ASA 81 mg PO or 300 WA QD. First dose now.  - IV Fluids for hydration.  - Lipitor 80 mg PO QD for LDL goal of < 70 when cleared by dysphagia screen.  - Telemetry monitoring.  -MRI Brain stroke protocol when stable.  - Check fasting Lipid panel and HbA1c  - TTE with bubble study.  - PT OT SLP evaluation.  - SCD/ SQ Lovenox for DVT prophylaxis.

## 2021-11-19 VITALS
TEMPERATURE: 98 F | DIASTOLIC BLOOD PRESSURE: 83 MMHG | OXYGEN SATURATION: 99 % | SYSTOLIC BLOOD PRESSURE: 123 MMHG | HEART RATE: 81 BPM | RESPIRATION RATE: 17 BRPM

## 2021-11-19 LAB
A1C WITH ESTIMATED AVERAGE GLUCOSE RESULT: 7.6 % — HIGH (ref 4–5.6)
CHOLEST SERPL-MCNC: 142 MG/DL — SIGNIFICANT CHANGE UP
ESTIMATED AVERAGE GLUCOSE: 171 MG/DL — HIGH (ref 68–114)
HDLC SERPL-MCNC: 46 MG/DL — LOW
LIPID PNL WITH DIRECT LDL SERPL: 69 MG/DL — SIGNIFICANT CHANGE UP
NON HDL CHOLESTEROL: 96 MG/DL — SIGNIFICANT CHANGE UP
RAPID RVP RESULT: SIGNIFICANT CHANGE UP
SARS-COV-2 RNA SPEC QL NAA+PROBE: SIGNIFICANT CHANGE UP
TRIGL SERPL-MCNC: 133 MG/DL — SIGNIFICANT CHANGE UP

## 2021-11-19 PROCEDURE — 93970 EXTREMITY STUDY: CPT | Mod: 26

## 2021-11-19 PROCEDURE — 93306 TTE W/DOPPLER COMPLETE: CPT | Mod: 26

## 2021-11-19 PROCEDURE — 70551 MRI BRAIN STEM W/O DYE: CPT | Mod: 26,MA

## 2021-11-19 PROCEDURE — 99232 SBSQ HOSP IP/OBS MODERATE 35: CPT

## 2021-11-19 PROCEDURE — 99217: CPT

## 2021-11-19 RX ORDER — KETOROLAC TROMETHAMINE 30 MG/ML
15 SYRINGE (ML) INJECTION ONCE
Refills: 0 | Status: DISCONTINUED | OUTPATIENT
Start: 2021-11-19 | End: 2021-11-19

## 2021-11-19 RX ORDER — ATORVASTATIN CALCIUM 80 MG/1
1 TABLET, FILM COATED ORAL
Qty: 30 | Refills: 0
Start: 2021-11-19 | End: 2021-12-18

## 2021-11-19 RX ORDER — ASPIRIN/CALCIUM CARB/MAGNESIUM 324 MG
1 TABLET ORAL
Qty: 30 | Refills: 0
Start: 2021-11-19 | End: 2021-12-18

## 2021-11-19 RX ORDER — ENOXAPARIN SODIUM 100 MG/ML
40 INJECTION SUBCUTANEOUS DAILY
Refills: 0 | Status: DISCONTINUED | OUTPATIENT
Start: 2021-11-19 | End: 2021-11-23

## 2021-11-19 RX ORDER — ATORVASTATIN CALCIUM 80 MG/1
80 TABLET, FILM COATED ORAL AT BEDTIME
Refills: 0 | Status: DISCONTINUED | OUTPATIENT
Start: 2021-11-19 | End: 2021-11-23

## 2021-11-19 RX ORDER — ASPIRIN/CALCIUM CARB/MAGNESIUM 324 MG
81 TABLET ORAL DAILY
Refills: 0 | Status: DISCONTINUED | OUTPATIENT
Start: 2021-11-19 | End: 2021-11-23

## 2021-11-19 RX ADMIN — PANTOPRAZOLE SODIUM 40 MILLIGRAM(S): 20 TABLET, DELAYED RELEASE ORAL at 07:54

## 2021-11-19 RX ADMIN — Medication 75 MILLIGRAM(S): at 10:16

## 2021-11-19 RX ADMIN — Medication 81 MILLIGRAM(S): at 11:12

## 2021-11-19 RX ADMIN — Medication 15 MILLIGRAM(S): at 05:13

## 2021-11-19 RX ADMIN — ATORVASTATIN CALCIUM 80 MILLIGRAM(S): 80 TABLET, FILM COATED ORAL at 20:24

## 2021-11-19 RX ADMIN — LISINOPRIL 5 MILLIGRAM(S): 2.5 TABLET ORAL at 05:17

## 2021-11-19 RX ADMIN — ENOXAPARIN SODIUM 40 MILLIGRAM(S): 100 INJECTION SUBCUTANEOUS at 11:13

## 2021-11-19 RX ADMIN — METFORMIN HYDROCHLORIDE 500 MILLIGRAM(S): 850 TABLET ORAL at 10:16

## 2021-11-19 NOTE — PROGRESS NOTE ADULT - ASSESSMENT
IMPRESSION:  - R/O Posterior circulation stroke or TIA.  - R/O Complex migraine    ASSESSMENT/ PLAN:   - Neuro checks and vital signs Q  hours.  - SBP goal permissive upto 180/100 for 24 hours.  - TTE with bubble study. Has a moderate sized PFO. Her ROPE Score is 3 however--giving her a 0% chance the the TIA is related to PFO  - Check lower extremity venous duplex ( Ordered)  - ASA 81 mg PO  QD.   - IV Fluids for hydration.  - Lipitor 80 mg PO QD for LDL goal of < 70 when cleared by dysphagia screen.  - Telemetry monitoring.  -MRI Brain stroke reviewed no acute infarct seen.  - Check fasting Lipid panel and HbA1c  - SCD/ SQ Lovenox for DVT prophylaxis.  If LE Duplex is negative D/C home on ASA 81 QD.

## 2021-11-19 NOTE — ED CDU PROVIDER DISPOSITION NOTE - PATIENT PORTAL LINK FT
You can access the FollowMyHealth Patient Portal offered by [facility] by registering at the following website: [email or web address]. By joining [facility] [employer] [facility]’s FollowMyHealth portal, you will also be able to view your health information using other applications (apps) compatible with our system.

## 2021-11-19 NOTE — ED ADULT NURSE REASSESSMENT NOTE - PUPILS PERRL
UROLOGY [place]  8 26 20    Cc elevated psa    Age 72, comes in today because his psa was 6.5 in 2020. It had been 8.1 in 2014. In 2012 he had a prostate bx by is and ten years previously had had one done by dr omid pierce, both times for elevated psa; the biopsies were negative for malignancy.      Nocturia x 0-1, no pains or burning. Stream is variable, at times with some intermittency. Pt is on flomax for 15 years. Denies needing to rush to the bathroom to void, no incontinence.     Says his main health problem is his eyes; has been legally blind x 10 years because of bad glaucoma.     In  had an ivp and it showed an abnormal lower pole of the left kidney with a cluster of       stones from 2 to 7 mm in size and also deformity of the calyceal system likely produced by a compressive effect. He was later found to have a renal cyst at that level. The rest of the left     kidney as well as the entire right kidney were normal.                         PMH    Surgical:  has a past surgical history that includes Glaucoma surgery; Eye surgery; Colonoscopy (3/17/2006  [place]); Colonoscopy (2012  [place]); Cystoscopy; and Colonoscopy (N/A, 2019).    Medical:  has a past medical history of Allergy, BPH (benign prostatic hyperplasia), Diverticulitis, Diverticulosis, Elevated PSA, Glaucoma, HEARING LOSS, Hypertension, and Kidney stone.    Familial: brother  of pancreas cancer. No fh of prostate ca    Social:     Meds:   Current Outpatient Medications on File Prior to Visit   Medication Sig Dispense Refill    brimonidine (ALPHAGAN) 0.2 % ophthalmic solution Place 1 drop into both eyes 3y      cholecalciferol, vitamin D3, (VITAMIN D-3) 1,000 unit Chew Take 1 tablet by mouthday      coenzyme Q10 (CO Q-10) 100 mg capsule Take 1 capsule by mouth 2 (day      dorzolamide-timolol (COSOPT) 2-0.5 % ophthalmic solution Place 1 drop into both eyes 2 (two) times da day      DUREZOL 0.05 % Drop 
"ophthalmic solution       latanoprost 0.005 % ophthalmic solution       magnesium oxide-Mg AA chelate (MG-PLUS-PROTEIN) 133 mg Tab Take by mouth.      pilocarpine HCL 2% (PILOCAR) 2 % ophthalmic solution       PROLENSA 0.07 % Drop PLACE 1 DR      tamsulosin (FLOMAX) 0.4 mg Cap TAKE 2 CAPSULES NIGHTLY 180 capsule 2    fish oil-omega-3 fatty acids 300-1,000 mg capsule Take 2 g by mouth once daily.      verapamiL (VERELAN) 180 MG C24P Take 1 capsule (180 mg total) by mo4 day 14 capsule 0       REVIEW OF SYSTEMS  GENERAL: . No headaches or dizzy spells.   HEENT: vision null, legally blind. Sinuses: No complaints.   CARDIOPULMONARY: No swelling of the legs; no chest pain. No shortness of breath.   GASTROINTESTINAL: No heartburn. Denies diarrhea; denies constipation, no blood or mucus in stools.   GENITOURINARY: slow voiding stream. Has high psa. Denies dysuria, bleeding or incontinence.   MUSCULOSKELETAL: has arthritic complaints such as pain or stiffness.   PSYCHIATRIC: No history of depression or anxiety.   ENDOCRINOLOGIC: No reports of sweating, cold or heat intolerance. No polyuria or polydipsia.   NEUROLOGICAL: No dizziness, syncope, paralysis  LYMPHATICS: No enlarged nodes. No history of splenectomy.    Pt alert, oriented, no distress  HEENT: wnl.  Neck: supple, no JVD, no lymphadenopathy  Chest: CV NSR  Lungs: normal chest expansion, no labored breathing  Abdomen flat, nontender, no organomegaly, no masses.  No hernias  Penis noncircumcised, meatus nl  Testes nl, epi nl, scrotum nl  ISAI: anus nl, sphincter nl tone, mucosa without lesions, prostate 60 gm, symmetric, no nodules or indurations  Extremities: no edema, peripheral pulses nl  Neuro: preserved    IMP    bph on flomax, doing well  Elevated psa, long hx since 2004. Has had two negative prostate biopsies. Level currently is 6.5, I discussed pros and cons of proceeding with another needle biopsy of the prostate. Pt is not inclined to get one, "unless "
"the value shoots up".     Sex dysfc on cialis.     Renal cysts    Kidney stones    RTC 6 mo to check again on psa level    "
yes

## 2021-11-19 NOTE — ED CDU PROVIDER SUBSEQUENT DAY NOTE - HISTORY
Pt has been resting comfortably overnight with no acute events or complaints. denied dizziness overnight. she is pending MR head Pt has been resting comfortably overnight with no acute events or complaints. denied dizziness overnight. she is pending MR head, TTE, Neuro following

## 2021-11-19 NOTE — ED CDU PROVIDER SUBSEQUENT DAY NOTE - ATTENDING CONTRIBUTION TO CARE
I performed a face to face history and physical exam of the patient and discussed their management with the student/resident/ACP. I reviewed the student/resident/ACP's note and agree with the documented findings and plan of care.    Pt pending MRI and TTE.

## 2021-11-19 NOTE — ED CDU PROVIDER DISPOSITION NOTE - ATTENDING CONTRIBUTION TO CARE
placed in observation for evaluation of possible TIA.  found to have PFO on echo LE dopplers performed as per neuro recommendation: unremarkable.  Pt discharged to home with daily baby aspirin as recommended by stroke hospitalist.

## 2021-11-19 NOTE — ED CDU PROVIDER DISPOSITION NOTE - CLINICAL COURSE
52 y/o F pt with hx of htn, hld, dm, depression, chronic low back pain, CVA presenting today for evaluation of dizziness. Pt had CT head, neck, stroke, MR head and LE ultrasound all without emergent findings. pt is aware of Saccular aneurysm and understands to follow up with neurosurgeon. Pt aware of PFO and will follow up with cardiology outpatient. she denies all symptoms at time of discharge and all results have been discussed with .

## 2021-11-19 NOTE — ED ADULT NURSE REASSESSMENT NOTE - COMFORT CARE
ambulated to bathroom/meal provided/plan of care explained/po fluids offered/repositioned/wait time explained
ambulated to bathroom/meal provided/plan of care explained/po fluids offered/repositioned/wait time explained
darkened lights/plan of care explained
ambulated to bathroom/plan of care explained/repositioned/wait time explained
ambulated to bathroom/plan of care explained/po fluids offered/side rails up

## 2021-11-19 NOTE — ED CDU PROVIDER DISPOSITION NOTE - NSFOLLOWUPCLINICS_GEN_ALL_ED_FT
[facility] Cardiology  Cardiology  [address] [place] [address]  Phone: [phone]  Fax:

## 2021-11-19 NOTE — PROGRESS NOTE ADULT - SUBJECTIVE AND OBJECTIVE BOX
YANICK JONES 51y Female       HPI:  Patient is a 51y old  Female who is primarily [place] speaking. [place] interpretor services were utilized for this evalaution.  Patient reports she woke up at about 5:30 am and developed a headache soon after. Later on at  1 PM she reports that she developed dizziness with nausea and double vision.  In the ED she was noticed to have left sided weakness and code stroke was activated.    21  Reports being back to her baseline. Currently has no neurological complaints.      PMH: HTN (hypertension)    HLD (hyperlipidemia)    Diabetes    Prior right cerebellar lacunar stroke.     PSH: No significant past surgical history    FAMILY HISTORY:  Family history of hypertension in mother  she is alive 85 yo    Family history of coronary artery disease (Father)  father  at age 64        SOCIAL HISTORY:  No history of tobacco or alcohol use     Allergies    No Known Allergies    Intolerances        Height (cm): 160 ( @ 16:20)  Weight (kg): 109.8 ( @ 17:02)  BMI (kg/m2): 42.9 ( @ 17:02)    Vital Signs Last 24 Hrs  T(C): 36.2 (2021 15:49), Max: 36.8 (2021 05:15)  T(F): 97.2 (2021 15:49), Max: 98.3 (2021 12:13)  HR: 96 (2021 15:49) (80 - 96)  BP: 136/93 (2021 15:49) (129/79 - 139/91)  BP(mean): 136 (2021 12:13) (136 - 136)  RR: 18 (2021 15:49) (16 - 18)  SpO2: 95% (2021 15:49) (95% - 99%)    MEDICATIONS    aspirin  chewable 81 milliGRAM(s) Oral daily  atorvastatin 80 milliGRAM(s) Oral at bedtime  enoxaparin Injectable 40 milliGRAM(s) SubCutaneous daily  lisinopril 5 milliGRAM(s) Oral daily  metFORMIN 500 milliGRAM(s) Oral daily  pantoprazole    Tablet 40 milliGRAM(s) Oral before breakfast  venlafaxine 75 milliGRAM(s) Oral daily         LABS:  CBC Full  -  ( 2021 16:22 )  WBC Count : 12.52 K/uL  RBC Count : 4.68 M/uL  Hemoglobin : 13.3 g/dL  Hematocrit : 41.6 %  Platelet Count - Automated : 290 K/uL  Mean Cell Volume : 88.9 fl  Mean Cell Hemoglobin : 28.4 pg  Mean Cell Hemoglobin Concentration : 32.0 gm/dL  Auto Neutrophil # : 8.60 K/uL  Auto Lymphocyte # : 3.00 K/uL  Auto Monocyte # : 0.70 K/uL  Auto Eosinophil # : 0.09 K/uL  Auto Basophil # : 0.06 K/uL  Auto Neutrophil % : 68.6 %  Auto Lymphocyte % : 24.0 %  Auto Monocyte % : 5.6 %  Auto Eosinophil % : 0.7 %  Auto Basophil % : 0.5 %          138  |  101  |  17.0  ----------------------------<  239<H>  3.9   |  21.0<L>  |  0.43<L>    Ca    8.9      2021 16:22    TPro  7.9  /  Alb  4.3  /  TBili  <0.2<L>  /  DBili  x   /  AST  20  /  ALT  27  /  AlkPhos  186<H>      LIVER FUNCTIONS - ( 2021 16:22 )  Alb: 4.3 g/dL / Pro: 7.9 g/dL / ALK PHOS: 186 U/L / ALT: 27 U/L / AST: 20 U/L / GGT: x           Hemoglobin A1C:   Lipid Panel  @ 06:14  Total Cholesterol, Serum 142  LDL --  Triglycerides 133      PT/INR - ( 2021 16:22 )   PT: 11.8 sec;   INR: 1.02 ratio         PTT - ( 2021 16:22 )  PTT:25.5 sec          138  |  101  |  17.0  ----------------------------<  239<H>  3.9   |  21.0<L>  |  0.43<L>    Ca    8.9      2021 16:22    TPro  7.9  /  Alb  4.3  /  TBili  <0.2<L>  /  DBili  x   /  AST  20  /  ALT  27  /  AlkPhos  186<H>      LIVER FUNCTIONS - ( 2021 16:22 )  Alb: 4.3 g/dL / Pro: 7.9 g/dL / ALK PHOS: 186 U/L / ALT: 27 U/L / AST: 20 U/L / GGT: x           Hemoglobin A1C:       PT/INR - ( 2021 16:22 )   PT: 11.8 sec;   INR: 1.02 ratio         PTT - ( 2021 16:22 )  PTT:25.5 sec      On Neurological Examination:    On Exam: Patient is AAOX3 Follows commands well. Can name and repeat accurately. No dysarthria.  PERRL EOMI VFF Face is symmetric.   Motor: 5/5 in all 4 extremities No drift.  Sensory is intact to LT   Cerebellar Normal FTN and HTS  Gait not assessed.      Patient was last seen well at 1300  Patient was seen by me at 1615  CT head was read by me at 1630       [facility] SS:   Date: 2021  Time: 1330  1a) Level of consciousness (0-3):   1b) Questions (0-2):   1c) Commands (0-2):   2  ) Gaze (0-2):   3  ) Visual field (0-3):   4  ) Facial palsy (0-3):   Motor  5a) Left arm (0-4):   5b) Right arm (0-4):   6a) Left leg (0-4):   6b) Right leg (0-4):   7  ) Ataxia (0-2):   Sensory  8  ) Sensory (0-2):   Speech  9  ) Language (0-3):   10) Dysarthria (0-2):   Extinction  11) Extinction/inattention (0-2):     Total score: = 0    Prestroke Modified Braxton: 0          GENERAL Exam:     Nontoxic , No Acute Distress   	  HEENT:  normocephalic, atraumatic  		  LUNGS:	Clear bilaterally  No Wheeze  Decreased bilaterally  	  HEART:	 Normal S1S2   No murmur RRR        	  GI/ ABDOMEN:  Soft  Non tender    EXTREMITIES:   No Edema  No Clubbing  No Cyanosis No Edema    MUSCULOSKELETAL: Normal Range of Motion  	   SKIN:      Normal   No Ecchymosis               RADIOLOGY  CTH   1.  No CT evidence of acuteintracranial hemorrhage, territorial infarction or mass effect. If patient has persistent symptoms, new focal neurologic deficits or high clinical suspicion for acute cerebral ischemia, consider further evaluation with MRI as it is a more sensitive modality.    2.  Partial right mastoid/middle ear cavity effusion, nonspecific but correlate clinically for otomastoiditis.      CTA brain:  1. No proximal large vessel arterial occlusion, flow-limiting stenosis, dissection or arteriovenous malformation within the brain. Consider further evaluation with MRI if there is persistent clinical suspicion for evolving acute cerebral infarction.  2. 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    CTA neck:  1. No arterial occlusion, flow-limiting stenosis, dissection or pseudoaneurysm within the neck.  2. ill-defined biapical groundglass opacities, nonspecific but likely due to expiratory phase imaging versus less likely infection/inflammation. Consider dedicated chest imaging as clinically warranted.    CT perfusion:  1. No evidence for acute core ischemic or areas of critically hypoperfused tissue at risk    Initial noncontrast and CTA/CTP findings were discussed with Dr. Adalberto Yost via telephone on 2021 at 4:47 PM with verbal read back.    MRI:  MR Head No Cont (21 @ 03:43)     IMPRESSION:    1)  Unremarkable MR study of the brain. No ischemic changes ordemyelinating foci noted. No hemorrhagic lesion or mass identified.  2)  inflammatory changes noted in the right temporal bone with middle ear and mastoid opacification.    --- End of Report ---    TTE Echo Complete w/ Contrast w/ Doppler (21 @ 12:26) >    Summary:   1. Technically suboptimal study. Endocardial visualization was enhanced with intravenous echo contrast.   2. Mildly enlarged left atrium.   3. Moderately sized patent foramen ovale, with predominantly right to left shunting across the atrial septum.   4. Intravenous injection of agitated saline demonstrates the presence of a patent foramen ovale.   5. There is mild concentric leftventricular hypertrophy.   6. Normal wall motion. Left ventricular ejection fraction, by visual estimation, is 60 to 65%. Grade I diastolic dysfunction.   7. Normal right atrial size.   8. Normal right ventricular size and function.   9. No significant valvular abnormality.  10. There is no evidence of pericardial effusion.    MD Jessica, RPVI Electronically signed on 2021 at 2:44:32 PM      < end of copied text >      TTE:

## 2021-11-19 NOTE — ED CDU PROVIDER SUBSEQUENT DAY NOTE - PROGRESS NOTE DETAILS
MRI negative. TTE shows "Moderately sized patent foramen ovale, with predominantly right to left shunting across the atrial septum." Pt feeling well, no dizziness. no complaints at this time. Results d/w Neurologist Dr. Child, states pt likely will be outpt f/u but he will see pt again prior to dc. Neurologist Dr. Child recommending us duplex b/l LE. if negative for DVT pt can be discharged on asa 81mg and f/u in office.

## 2021-11-19 NOTE — ED CDU PROVIDER SUBSEQUENT DAY NOTE - NS ED ROS FT
CONSTITUTIONAL: No fevers, no chills  Eyes: No vision changes  Cardiovascular: No Chest pain  Respiratory: No SOB  Gastrointestinal: - n/v/d, , no abd pain  Genitourinary: no dysuria, no hematuria  SKIN: no rashes   MSK: no weakness, no myalgias, no arthralgias  NEURO: +headache, - weakness, no numbness  PSYCHIATRIC: no SI/HI

## 2021-11-19 NOTE — ED ADULT NURSE REASSESSMENT NOTE - NS ED NURSE REASSESS COMMENT FT1
Assumed care of the patient at 0730. Verbal report received from Richa GOSS Obs. Patient A&Ox4. no s/s of acute distress ir pain. No neuro deficits noted. NIH 0. states some dizziness persists while ambulating. NSR on CM. VSS. PIV patent. Ambulatory with steady gait. Patient pending MRI read and TTE testing. Patient in understanding of plan of care. Patient with no further questions for the RN. Resting in comfort. Call bell within reach and encouraged to use when assistance needed. Will continue to monitor.
care assumed from WOLFGANG Matthew. all questions answered. AAO x 4. robin Howard at bedside. neuro intact, pending b/l LE DVT study. tele in use. will ctm
Received patient from aleksander RN.  Pt AxO4, VSS.  Pt denies chest pain/SOB at this time.  Cardio NSR on cardiac monitor.   Left thumb IV insertion site  intact with no sign of infiltration noted--, flushing without difficulty. Observed pt ambulating to the bathroom with gait and balance steady with no neuro deficit noted .Still awaiting for MRI Safety measures taken, bed in low position, call bell within reach, side rails up x2.  Plan of care explained. support provided in [place] of [place] Pt verbalized understanding.  Will continue to monitor.

## 2021-11-19 NOTE — ED CDU PROVIDER DISPOSITION NOTE - CARE PROVIDERS DIRECT ADDRESSES
[email or web address]_Unknown

## 2021-11-19 NOTE — ED ADULT NURSE REASSESSMENT NOTE - GENERAL PATIENT STATE
comfortable appearance/cooperative
comfortable appearance/cooperative/smiling/interactive
comfortable appearance/cooperative

## 2021-11-19 NOTE — ED ADULT NURSE REASSESSMENT NOTE - NIH STROKE SCALE: 1B. LOC QUESTIONS, QM
(0) Answers both questions correctly
(0) Answers both questions correctly
1
(0) Answers both questions correctly
(0) Answers both questions correctly

## 2021-11-19 NOTE — ED CDU PROVIDER DISPOSITION NOTE - CARE PROVIDER_API CALL
Avinash Langford (MD; PhD)  Neurology; Vascular Neurology  [address] [facility] [address]  [place] [address]  Phone: [phone]  Fax: [phone]  Follow Up Time:     Tan Sierra; PhD)  Neurosurgery  [address] [place] [address]  Phone: [phone]  Fax: [phone]  Follow Up Time:

## 2021-11-19 NOTE — ED ADULT NURSE REASSESSMENT NOTE - ANCILLARY STATUS
TTE testing
TTE read
TTE testing, MRI read
b/l LE duplex/radiology results pending
Awaiting for MRI/awaiting radiology

## 2021-11-21 ENCOUNTER — INPATIENT (INPATIENT)
Facility: HOSPITAL | Age: 51
LOS: 2 days | Discharge: HOME | DRG: 41 | End: 2021-11-24
Attending: FAMILY MEDICINE | Admitting: STUDENT IN AN ORGANIZED HEALTH CARE EDUCATION/TRAINING PROGRAM
Payer: MEDICAID

## 2021-11-21 VITALS — DIASTOLIC BLOOD PRESSURE: 125 MMHG | HEIGHT: 63 IN | SYSTOLIC BLOOD PRESSURE: 166 MMHG

## 2021-11-21 DIAGNOSIS — G45.9 TRANSIENT CEREBRAL ISCHEMIC ATTACK, UNSPECIFIED: ICD-10-CM

## 2021-11-21 DIAGNOSIS — Z98.891 HISTORY OF UTERINE SCAR FROM PREVIOUS SURGERY: Chronic | ICD-10-CM

## 2021-11-21 LAB
A1C WITH ESTIMATED AVERAGE GLUCOSE RESULT: 7.6 % — HIGH (ref 4–5.6)
ALBUMIN SERPL ELPH-MCNC: 4.2 G/DL — SIGNIFICANT CHANGE UP (ref 3.3–5.2)
ALP SERPL-CCNC: 156 U/L — HIGH (ref 40–120)
ALT FLD-CCNC: 29 U/L — SIGNIFICANT CHANGE UP
ANION GAP SERPL CALC-SCNC: 16 MMOL/L — SIGNIFICANT CHANGE UP (ref 5–17)
APTT BLD: 30.8 SEC — SIGNIFICANT CHANGE UP (ref 27.5–35.5)
AST SERPL-CCNC: 23 U/L — SIGNIFICANT CHANGE UP
BASOPHILS # BLD AUTO: 0.05 K/UL — SIGNIFICANT CHANGE UP (ref 0–0.2)
BASOPHILS NFR BLD AUTO: 0.5 % — SIGNIFICANT CHANGE UP (ref 0–2)
BILIRUB SERPL-MCNC: 0.3 MG/DL — LOW (ref 0.4–2)
BUN SERPL-MCNC: 15.2 MG/DL — SIGNIFICANT CHANGE UP (ref 8–20)
CALCIUM SERPL-MCNC: 9.3 MG/DL — SIGNIFICANT CHANGE UP (ref 8.6–10.2)
CHLORIDE SERPL-SCNC: 102 MMOL/L — SIGNIFICANT CHANGE UP (ref 98–107)
CO2 SERPL-SCNC: 23 MMOL/L — SIGNIFICANT CHANGE UP (ref 22–29)
CREAT SERPL-MCNC: 0.46 MG/DL — LOW (ref 0.5–1.3)
EOSINOPHIL # BLD AUTO: 0.13 K/UL — SIGNIFICANT CHANGE UP (ref 0–0.5)
EOSINOPHIL NFR BLD AUTO: 1.2 % — SIGNIFICANT CHANGE UP (ref 0–6)
ESTIMATED AVERAGE GLUCOSE: 171 MG/DL — HIGH (ref 68–114)
ETHANOL SERPL-MCNC: <10 MG/DL — SIGNIFICANT CHANGE UP (ref 0–9)
GLUCOSE BLDC GLUCOMTR-MCNC: 164 MG/DL — HIGH (ref 70–99)
GLUCOSE SERPL-MCNC: 181 MG/DL — HIGH (ref 70–99)
HCT VFR BLD CALC: 43.5 % — SIGNIFICANT CHANGE UP (ref 34.5–45)
HGB BLD-MCNC: 14.2 G/DL — SIGNIFICANT CHANGE UP (ref 11.5–15.5)
IMM GRANULOCYTES NFR BLD AUTO: 0.4 % — SIGNIFICANT CHANGE UP (ref 0–1.5)
INR BLD: 1.06 RATIO — SIGNIFICANT CHANGE UP (ref 0.88–1.16)
LYMPHOCYTES # BLD AUTO: 2.47 K/UL — SIGNIFICANT CHANGE UP (ref 1–3.3)
LYMPHOCYTES # BLD AUTO: 23.3 % — SIGNIFICANT CHANGE UP (ref 13–44)
MCHC RBC-ENTMCNC: 29.2 PG — SIGNIFICANT CHANGE UP (ref 27–34)
MCHC RBC-ENTMCNC: 32.6 GM/DL — SIGNIFICANT CHANGE UP (ref 32–36)
MCV RBC AUTO: 89.3 FL — SIGNIFICANT CHANGE UP (ref 80–100)
MONOCYTES # BLD AUTO: 0.72 K/UL — SIGNIFICANT CHANGE UP (ref 0–0.9)
MONOCYTES NFR BLD AUTO: 6.8 % — SIGNIFICANT CHANGE UP (ref 2–14)
NEUTROPHILS # BLD AUTO: 7.19 K/UL — SIGNIFICANT CHANGE UP (ref 1.8–7.4)
NEUTROPHILS NFR BLD AUTO: 67.8 % — SIGNIFICANT CHANGE UP (ref 43–77)
PLATELET # BLD AUTO: 289 K/UL — SIGNIFICANT CHANGE UP (ref 150–400)
POTASSIUM SERPL-MCNC: 4 MMOL/L — SIGNIFICANT CHANGE UP (ref 3.5–5.3)
POTASSIUM SERPL-SCNC: 4 MMOL/L — SIGNIFICANT CHANGE UP (ref 3.5–5.3)
PROT SERPL-MCNC: 8.1 G/DL — SIGNIFICANT CHANGE UP (ref 6.6–8.7)
PROTHROM AB SERPL-ACNC: 12.3 SEC — SIGNIFICANT CHANGE UP (ref 10.6–13.6)
RBC # BLD: 4.87 M/UL — SIGNIFICANT CHANGE UP (ref 3.8–5.2)
RBC # FLD: 12.4 % — SIGNIFICANT CHANGE UP (ref 10.3–14.5)
SODIUM SERPL-SCNC: 141 MMOL/L — SIGNIFICANT CHANGE UP (ref 135–145)
TROPONIN T SERPL-MCNC: <0.01 NG/ML — SIGNIFICANT CHANGE UP (ref 0–0.06)
WBC # BLD: 10.6 K/UL — HIGH (ref 3.8–10.5)
WBC # FLD AUTO: 10.6 K/UL — HIGH (ref 3.8–10.5)

## 2021-11-21 PROCEDURE — 70496 CT ANGIOGRAPHY HEAD: CPT | Mod: 26,MC

## 2021-11-21 PROCEDURE — 70498 CT ANGIOGRAPHY NECK: CPT | Mod: 26,MA

## 2021-11-21 PROCEDURE — 0042T: CPT

## 2021-11-21 PROCEDURE — 99223 1ST HOSP IP/OBS HIGH 75: CPT

## 2021-11-21 PROCEDURE — 99291 CRITICAL CARE FIRST HOUR: CPT

## 2021-11-21 PROCEDURE — 99255 IP/OBS CONSLTJ NEW/EST HI 80: CPT

## 2021-11-21 PROCEDURE — 93010 ELECTROCARDIOGRAM REPORT: CPT

## 2021-11-21 RX ORDER — SODIUM CHLORIDE 9 MG/ML
1000 INJECTION, SOLUTION INTRAVENOUS
Refills: 0 | Status: DISCONTINUED | OUTPATIENT
Start: 2021-11-21 | End: 2021-11-24

## 2021-11-21 RX ORDER — ACETAMINOPHEN 500 MG
650 TABLET ORAL ONCE
Refills: 0 | Status: COMPLETED | OUTPATIENT
Start: 2021-11-21 | End: 2021-11-21

## 2021-11-21 RX ORDER — ASPIRIN/CALCIUM CARB/MAGNESIUM 324 MG
81 TABLET ORAL DAILY
Refills: 0 | Status: DISCONTINUED | OUTPATIENT
Start: 2021-11-22 | End: 2021-11-24

## 2021-11-21 RX ORDER — ONDANSETRON 8 MG/1
4 TABLET, FILM COATED ORAL ONCE
Refills: 0 | Status: COMPLETED | OUTPATIENT
Start: 2021-11-21 | End: 2021-11-21

## 2021-11-21 RX ORDER — ASPIRIN/CALCIUM CARB/MAGNESIUM 324 MG
300 TABLET ORAL DAILY
Refills: 0 | Status: DISCONTINUED | OUTPATIENT
Start: 2021-11-21 | End: 2021-11-21

## 2021-11-21 RX ORDER — DEXTROSE 50 % IN WATER 50 %
12.5 SYRINGE (ML) INTRAVENOUS ONCE
Refills: 0 | Status: DISCONTINUED | OUTPATIENT
Start: 2021-11-21 | End: 2021-11-24

## 2021-11-21 RX ORDER — INSULIN LISPRO 100/ML
VIAL (ML) SUBCUTANEOUS EVERY 6 HOURS
Refills: 0 | Status: DISCONTINUED | OUTPATIENT
Start: 2021-11-21 | End: 2021-11-24

## 2021-11-21 RX ORDER — DEXTROSE 50 % IN WATER 50 %
25 SYRINGE (ML) INTRAVENOUS ONCE
Refills: 0 | Status: DISCONTINUED | OUTPATIENT
Start: 2021-11-21 | End: 2021-11-24

## 2021-11-21 RX ORDER — ATORVASTATIN CALCIUM 80 MG/1
1 TABLET, FILM COATED ORAL
Qty: 0 | Refills: 0 | DISCHARGE

## 2021-11-21 RX ORDER — GLUCAGON INJECTION, SOLUTION 0.5 MG/.1ML
1 INJECTION, SOLUTION SUBCUTANEOUS ONCE
Refills: 0 | Status: DISCONTINUED | OUTPATIENT
Start: 2021-11-21 | End: 2021-11-24

## 2021-11-21 RX ORDER — CLOPIDOGREL BISULFATE 75 MG/1
300 TABLET, FILM COATED ORAL ONCE
Refills: 0 | Status: COMPLETED | OUTPATIENT
Start: 2021-11-21 | End: 2021-11-21

## 2021-11-21 RX ORDER — DEXTROSE 50 % IN WATER 50 %
15 SYRINGE (ML) INTRAVENOUS ONCE
Refills: 0 | Status: DISCONTINUED | OUTPATIENT
Start: 2021-11-21 | End: 2021-11-24

## 2021-11-21 RX ORDER — CLOPIDOGREL BISULFATE 75 MG/1
75 TABLET, FILM COATED ORAL DAILY
Refills: 0 | Status: DISCONTINUED | OUTPATIENT
Start: 2021-11-22 | End: 2021-11-24

## 2021-11-21 RX ORDER — ATORVASTATIN CALCIUM 80 MG/1
80 TABLET, FILM COATED ORAL AT BEDTIME
Refills: 0 | Status: DISCONTINUED | OUTPATIENT
Start: 2021-11-21 | End: 2021-11-24

## 2021-11-21 RX ORDER — VENLAFAXINE HCL 75 MG
75 CAPSULE, EXT RELEASE 24 HR ORAL DAILY
Refills: 0 | Status: DISCONTINUED | OUTPATIENT
Start: 2021-11-21 | End: 2021-11-24

## 2021-11-21 RX ORDER — LISINOPRIL 2.5 MG/1
10 TABLET ORAL DAILY
Refills: 0 | Status: DISCONTINUED | OUTPATIENT
Start: 2021-11-21 | End: 2021-11-24

## 2021-11-21 RX ADMIN — CLOPIDOGREL BISULFATE 300 MILLIGRAM(S): 75 TABLET, FILM COATED ORAL at 13:31

## 2021-11-21 RX ADMIN — ONDANSETRON 4 MILLIGRAM(S): 8 TABLET, FILM COATED ORAL at 13:26

## 2021-11-21 RX ADMIN — Medication 650 MILLIGRAM(S): at 18:26

## 2021-11-21 RX ADMIN — ATORVASTATIN CALCIUM 80 MILLIGRAM(S): 80 TABLET, FILM COATED ORAL at 22:21

## 2021-11-21 RX ADMIN — Medication 1: at 16:37

## 2021-11-21 NOTE — CONSULT NOTE ADULT - ASSESSMENT
The patient is a 51y Female who is followed by neurology because of possible stroke    Stroke/recurrent TIA  she has symptoms intermittent for a few days  she has PFO on TTE, no DVT on LE duplex  she had no stroke on MRI    Suggest the following:    - repeat MRI  - CATY/ILR, cardiology eval for closure of PFO given recurrent symptoms  - PT/OT  - speech and swallow therapy  - ASA 81 plavix 75  - LDL 69, continue lipitor  - HgbA1c= 7.6% needs tight glucose control  - DVT prophylaxis    will follow with you    Avinash Langford MD PhD   [id number]

## 2021-11-21 NOTE — ED PROVIDER NOTE - NS ED ROS FT
Constitutional: no fever, no sweats, and no chills.  CV: +chest pain, no edema.  Resp: no cough, +dyspnea  GI: no abdominal pain, no nausea and no vomiting.  MSK: no msk pain, no weakness  Skin: no lesions, and no rashes.  Neuro: no headache, no sensory deficits, and +dizziness  ROS otherwise negative except as noted in HPI.

## 2021-11-21 NOTE — H&P ADULT - ASSESSMENT
50yo F w/pmh DMT2, HTN and recent observation stay for dizziness p/w persistent dizziness as well as left sided weakness and numbness, admitted for TIA workup.     #TIA   - w/ associated dizziness, right sided facial droop and left sided facial numbness and Left extremities weakness  - CTH/neck and angio:   There is a 1.4 mm left P-comm aneurysm versus infundibulum. 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 No acute infacrt  - admit to stepdown for q2hr neuro check for toda  - monitor on tele  - TTE 11/19: moderately sized patent foramen ovale, with predominantly right to left shunting across the atrial septum and DVT   - cards consulted for CATY  - f/u blood ETOH and Utox level    #Brain aneurysm  - outpatient routine f/u    #HTN urgency  - on Lisinopril 5mg at home  - BP currently poorly controlled  - will increase Lisinopril to 10mg QD    #DMT2  - hold metformin  - A1C 7.8  - start ISS    #Elevated AP  - f/u AM GGT  - daily CMP for now     #Leukocytosis  - suspect reactive, improved from last admission  - daily CBC    #DVT:  #Diet: Pending s/s evaluation  Dispo: pending clinical course 52yo F w/pmh DMT2, HTN and recent observation stay for dizziness p/w persistent dizziness as well as left sided weakness and numbness, admitted for TIA workup.     #TIA   - w/ associated dizziness, right sided facial droop and left sided facial numbness and Left extremities weakness  - CTH/neck and angio:   There is a 1.4 mm left P-comm aneurysm versus infundibulum. 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 No acute infacrt  - admit to stepdown for q2hr neuro check for today, downgrade in AM if neurologically stable  - c/w ASA and Plavix and Statin  - monitor on tele  - TTE 11/19: moderately sized patent foramen ovale, with predominantly right to left shunting across the atrial septum and DVT   - MRI head pending   - [place] cards consulted for CATY, salome recs  - f/u blood ETOH and Utox level    #Brain aneurysm  - outpatient routine f/u    #HTN urgency  - on Lisinopril 5mg at home  - BP currently poorly controlled  - will increase Lisinopril to 10mg QD    #DMT2  - hold metformin  - A1C 7.8  - start ISS    #Elevated AP  - f/u AM GGT  - daily CMP for now     #Leukocytosis  - suspect reactive, improved from last admission  - daily CBC    #DVT: ASA/Plavix and SCD  #Diet: Pending s/s evaluation, advance as tolerated   Dispo: pending clinical course 50yo F w/pmh DMT2, HTN and recent observation stay for dizziness p/w persistent dizziness as well as left sided weakness and numbness, admitted for TIA workup.     #TIA   - w/ associated dizziness, reported right sided facial droop and left sided facial numbness and Left extremities weakness  - CTH/neck and angio:   There is a 1.4 mm left P-comm aneurysm versus infundibulum. 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 No acute infacrt  - admit to stepdown for q2hr neuro check for today, downgrade in AM if neurologically stable  - c/w ASA and Plavix and Statin  - monitor on tele  - TTE 11/19: moderately sized patent foramen ovale, with predominantly right to left shunting across the atrial septum and DVT study negative   - MRI head pending   - [place] cards consulted for CATY, salome recs  - f/u blood ETOH and Utox level    #Brain aneurysm  - outpatient routine f/u    #HTN   - on Lisinopril 5mg at home  - BP currently poorly controlled with SBP > 170  - will increase Lisinopril to 10mg QD    #DMT2  - hold metformin  - A1C 7.8  - start ISS    #Elevated AP  - f/u AM GGT  - daily CMP for now     #Leukocytosis  - suspect reactive, improved from last admission  - daily CBC    #DVT: ASA/Plavix and SCD  #Diet: Pending bedside s/s evaluation, advance as tolerated   Dispo: pending clinical course 50yo F w/pmh DMT2, HTN and recent observation stay for dizziness p/w persistent dizziness as well as left sided weakness and numbness, admitted for TIA workup.     #TIA   - w/ associated dizziness, reported right sided facial droop and left sided facial numbness and Left extremities weakness  - CTH/neck and angio:   There is a 1.4 mm left P-comm aneurysm versus infundibulum. 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 No acute infacrt  - admit to stepdown for q2hr neuro check for today, downgrade in AM if neurologically stable  - c/w ASA and Plavix and Statin  - monitor on tele  - TTE 11/19: moderately sized patent foramen ovale, with predominantly right to left shunting across the atrial septum and DVT study negative   - MRI head pending   - [place] cards consulted for CATY, salome recs  - f/u blood ETOH and Utox level    #Brain aneurysm - noted on CTH as above  - outpatient routine f/u    #HTN   - on Lisinopril 5mg at home  - BP currently poorly controlled with SBP > 170  - will increase Lisinopril to 10mg QD    #DMT2  - hold metformin  - A1C 7.8  - start ISS    #Elevated AP  - f/u AM GGT  - daily CMP for now     #Leukocytosis  - suspect reactive, improved from last admission  - daily CBC    #DVT: ASA/Plavix and SCD  #Diet: Pending bedside s/s evaluation, advance as tolerated   Dispo: pending clinical course 52yo F w/pmh DMT2, HTN and recent observation stay for dizziness p/w persistent dizziness as well as left sided weakness and numbness, admitted for TIA workup.     #TIA   - w/ associated dizziness, reported right sided facial droop and left sided facial numbness and Left extremities weakness  - CTH/neck and angio:   There is a 1.4 mm left P-comm aneurysm versus infundibulum. 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 No acute infacrt  - admit to stepdown for q2hr neuro check for today, downgrade in AM if neurologically stable  - c/w ASA and Plavix and Statin  - monitor on tele  - TTE 11/19: moderately sized patent foramen ovale, with predominantly right to left shunting across the atrial septum and DVT study negative   - MRI head pending   - [place] cards consulted for CATY, salome recs  - f/u blood ETOH and Utox level    #Brain aneurysm - noted on CTH as above  - outpatient routine f/u    #HTN   - on Lisinopril 5mg at home  - BP currently poorly controlled with SBP > 170  - will increase Lisinopril to 10mg QD    #DMT2  - hold metformin  - A1C 7.6  - start ISS    #Elevated AP  - f/u AM GGT  - daily CMP for now     #Leukocytosis  - suspect reactive, improved from last admission  - daily CBC    #DVT: ASA/Plavix and SCD  #Diet: Pending bedside s/s evaluation, advance as tolerated   Dispo: pending clinical course 50yo F w/pmh DMT2, HTN and recent observation stay for dizziness p/w persistent dizziness as well as left sided weakness and numbness, admitted for TIA workup.     #TIA   - w/ associated dizziness, reported right sided facial droop and left sided facial numbness and Left extremities weakness  - CTH/neck and angio:   There is a 1.4 mm left P-comm aneurysm versus infundibulum. 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 No acute infacrt  - admit to stepdown for q2hr neuro check for today, downgrade in AM if neurologically stable  - c/w ASA and Plavix and Statin  - monitor on tele  - TTE 11/19: moderately sized patent foramen ovale, with predominantly right to left shunting across the atrial septum and DVT study negative   - MRI head pending   - [place] cards consulted for CATY with possible PFO closure, salome recs  - f/u blood ETOH and Utox level    #Brain aneurysm - noted on CTH as above  - outpatient routine f/u    #HTN   - on Lisinopril 5mg at home  - BP currently poorly controlled with SBP > 170  - will increase Lisinopril to 10mg QD    #DMT2  - hold metformin  - A1C 7.6  - start ISS    #Elevated AP  - f/u AM GGT  - daily CMP for now     #Leukocytosis  - suspect reactive, improved from last admission  - daily CBC    #DVT: ASA/Plavix and SCD  #Diet: Pending bedside s/s evaluation, advance as tolerated   Dispo: pending clinical course 50yo F w/pmh DMT2, HTN and recent observation stay for dizziness p/w persistent dizziness as well as left sided weakness and numbness, admitted for TIA workup.     #TIA   - w/ associated dizziness, reported right sided facial droop and left sided facial numbness and Left extremities weakness  - CTH/neck and angio:   There is a 1.4 mm left P-comm aneurysm versus infundibulum. 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 No acute infacrt  - admit to stepdown for q2hr neuro check for today, downgrade in AM if neurologically stable  - c/w ASA and Plavix and Statin  - monitor on tele  - TTE 11/19: moderately sized patent foramen ovale, with predominantly right to left shunting across the atrial septum and DVT study negative   - MRI head pending   - [place] cards consulted for CATY with possible PFO closure, salome recs  - f/u blood ETOH and Utox level    #Moderate PFO:  - cards consulted for CATY, salome recs    #Brain aneurysm - noted on CTH as above  - outpatient routine f/u    #HTN   - on Lisinopril 5mg at home  - BP currently poorly controlled with SBP > 170  - will increase Lisinopril to 10mg QD    #DMT2  - hold metformin  - A1C 7.6  - start ISS    #Elevated AP  - f/u AM GGT  - daily CMP for now     #Leukocytosis  - suspect reactive, improved from last admission  - daily CBC    #DVT: ASA/Plavix and SCD  #Diet: Pending bedside s/s evaluation, advance as tolerated   Dispo: pending clinical course

## 2021-11-21 NOTE — ED PROVIDER NOTE - ATTENDING CONTRIBUTION TO CARE
patient critically ill requiring medications with intensive monitoring, discussion with consultants, discussion with patient and family, interpretation of diagnostic studies.     I, Brenda Marina, have personally seen and examined this patient. I have fully participated in the care of this patient. I have reviewed all pertinent clinical information, including history, physical exam, plan and the Resident's note and agree except as noted below.     Pt with dizziness/nausea last night at 7pm, present this AM, near syncopal feeling not vertigo, ~1130 felt left sided weakness/numbness. on arrival was complaining of CP and had near syncopal event. patient had simialr sx recently with PFO found. CODE STROKE called. patient sx rapidly improved to only sensory change on left with NIH 1. no TPA. Plavix load as recommend by tele neurologist. TBA for further w/u and CATY

## 2021-11-21 NOTE — ED PROVIDER NOTE - PROGRESS NOTE DETAILS
Given the significant and immediate threats to this patient based on initial presentation, the benefits of emergency contrast-enhanced CT imaging without obtaining GFR/creatinine serum level results greatly outweigh the potential risk of harm due to contrast-induced nephropathy. -[place] DO Resident Karen Grijalva: In the middle of interview, pt clutched her chest, reported she was in pain, became unresponsive. Attending physician, additional staff called to bedside. Pulses intact, respirations intact, pt became more alert in the following 5 minutes. VSS, proceed with CT scan. Resident Karen Grijalva: reassessed pt, symptoms greatly improved, NIHSS 2 for subjective numbness of L leg and face, R facial droop. Resident Karen Grijalva: Dr. Marina discussed with telestroke, plan for loading dose Plavix. Recommends continued w/u with neurology, consult cardiology for possible CATY. Resident Karen Grijalva: In the middle of interview, pt clutched her chest, reported she was in pain, became unresponsive. Attending physician, additional staff called to bedside. Pulses intact, respirations intact, pt became more alert in the following 5 minutes. EKG from 5 minutes ago NSR. VSS, proceed with CT scan.

## 2021-11-21 NOTE — ED PROVIDER NOTE - CLINICAL SUMMARY MEDICAL DECISION MAKING FREE TEXT BOX
50yo F w/pmh DM presents for dizziness, nausea, L-sided body weakness, R-sided facial droop. After clarification of last known well time, activated as code stroke. CTs pending.

## 2021-11-21 NOTE — CONSULT NOTE ADULT - SUBJECTIVE AND OBJECTIVE BOX
Consultation                                                                 [facility]                                                        Office: [address] [address]                                                       Telephone: [phone]. Fax:[phone]                                                              Chief complaint:    Patient is a 51y old  Female who presents with a chief complaint of TIA (2021 16:30)      HPI:  50yo F w/pmh DM, HTN and recent observation stay for dizziness p/w persistent dizziness as well as left sided weakness and numbness. Per patient, she was in the ED for dizziness last week, which never really resolved by the time she was discharged. CTH and MRI during that admission was negative. Patient was started on statin and ASA and sent home for outpatient workup. She reports worsening dizziness with associated chest pressure since last night with nausea and NBNB emesis, Chest pain 2/10,  midsternal radiating to under breast, relieved with rest. . Went to sleep and woke up this AM withe left sided weakness as well as left facial and numbness. Reports overall exhaustion this AM, with dizziness my head spinning and chest pain 5/10 non radiating, relieved with rest, + n/v as well, so I came to the ED for further evaluation.       ED vitals with /125, labs WNL. Noted with right facial droop s/p code stroke. Admitted for further management.   Patient personally denies ETOH/illicit drug use. However, daughter reports that her mother does use cocaine on the weekends and sometimes drinks on the weekend as well.  (2021 14:38)    REVIEW OF SYMPTOMS: Cardiovascular:  See HPI. No chest pain,  No dyspnea,  No syncope,  No palpitations, No dizziness, No Orthopnea,      No Paroxsymal nocturnal dyspnea;  Respiratory:  No Dyspnea, No cough,     Genitourinary:  No dysuria, no hematuria; Gastrointestinal:  No nausea, no vomiting. No diarrhea.  No abdominal pain. No dark color stool, no melena ; Neurological: No headache, no dizziness, no slurred speech;  Psychiatric: No agitation, no anxiety.  ALL OTHER REVIEW OF SYSTEMS ARE NEGATIVE.    ALLERGIES: Allergies    CURRENT MEDICATIONS:  lisinopril 10 milliGRAM(s) Oral daily  venlafaxine XR.  atorvastatin  insulin lispro (ADMELOG) corrective regimen sliding scale    HOME MEDICATIONS:  Aspirin 81mg po dialy  Lipitor 80mg po qhs  Lisinopirl 5mg po daily  Metformin 500mg  Venlafaxine     PAST MEDICAL HISTORY  HTN (hypertension)  HLD (hyperlipidemia)  Diabetes    PAST SURGICAL HISTORY  No significant past surgical history  H/O:     FAMILY HISTORY:  Family history of hypertension in mother  she is alive 85 yo    Family history of coronary artery disease (Father)  father  at age 64 from MI    SOCIAL HISTORY:  Denies smoking/alcohol/drugs    CIGARETTES:   +smoker for 40 years     ALCOHOL:    DRUGS:  Deneis but daugter states cocaine use on the weekends.     Vital Signs Last 24 Hrs  T(C): 36.8 (2021 18:15), Max: 36.8 (2021 18:15)  T(F): 98.2 (2021 18:15), Max: 98.2 (2021 18:15)  HR: 82 (2021 18:15) (76 - 82)  BP: 134/87 (2021 18:15) (134/87 - 172/90)  RR: 18 (2021 18:15) (18 - 18)  SpO2: 97% (2021 18:15) (96% - 98%)    PHYSICAL EXAM:  Constitutional: Comfortable . No acute distress. obese.   HEENT: Atraumatic and normcephalic , neck is supple . no JVD. No carotid bruit. PEERL   CNS: A&Ox3. No focal deficits. EOMI. Cranial nerves II-IX are intact.   Lymph Nodes: Cervical : Not palpable.  Respiratory: CTAB  Cardiovascular: S1S2 RRR. No murmur/rubs or gallop.  Gastrointestinal: Soft non-tender and non distended . +Bowel sounds.  Extremities: No edema.   Psychiatric: Calm . no agitation.  Skin: No skin rash/ulcers visualized to face, hands or feet.    Intake and output:     LABS:                        14.2   10.60 )-----------( 289      ( 2021 12:57 )             43.5     11-    141  |  102  |  15.2  ----------------------------<  181<H>  4.0   |  23.0  |  0.46<L>    Ca    9.3      2021 12:57    TPro  8.1  /  Alb  4.2  /  TBili  0.3<L>  /  DBili  x   /  AST  23  /  ALT  29  /  AlkPhos  156<H>  11-21    CARDIAC MARKERS ( 2021 12:57 )  x     / <0.01 ng/mL / x     / x     / x        ;p-BNP=  PT/INR - ( 2021 12:57 )   PT: 12.3 sec;   INR: 1.06 ratio         PTT - ( 2021 12:57 )  PTT:30.8 sec      INTERPRETATION OF TELEMETRY: Reviewed by me.   ECG: Reviewed by me.     RADIOLOGY & ADDITIONAL STUDIES:    X-ray:  reviewed by me.   CT scan:        INTERPRETATION:  HEAD CT, CT PERFUSION, CTA OF THE [facility] AND NECK:    INDICATIONS: Stroke code    TECHNIQUE:    HEAD CT:    Serial axial images were obtained from the skull base to the vertex without the use of intravenous contrast. RAPID artificial intelligence was used for perfusion analysis and for preliminary evaluation of intracranial hemorrhage.    CTA [facility]:    After the intravenous power injection of non-ionic contrast material, serial thin sections were obtained through the intracranial circulation on a multislice CT scanner.  Images were reformatted using a dedicated 3D software package and viewed on a dedicated workstation in multiple planes.    CTA NECK:    After the intravenous power injection of non-ionic contrast material, serial thin sections were obtained through the cervical circulation on a multislice CT scanner.  Images were reformatted using a dedicated 3D software package and viewed on a dedicated workstation in multiple planes.    CONTRAST: 70 mL Omnipaque 350.      COMPARISON EXAMINATION: None.    FINDINGS:    VENTRICLES AND SULCI: Ventricles and sulci are unremarkable for patient age.  INTRA-AXIAL: No intracranial mass, acute hemorrhage, or midline shift is present. Chronic right cerebellar lacunar infarct.    EXTRA-AXIAL: No extra-axial fluid collection is present.  INTRACRANIAL HEMORRHAGE: None.    VISUALIZED SINUSES: No air-fluid levels are identified.  VISUALIZED MASTOIDS:  Clear  CALVARIUM:  Intact  MISCELLANEOUS:  None.      CT RAPID PERFUSION:    INFARCT CORE: 0 mL    TISSUE AT RISK: 0 mL    MISMATCH RATIO: None.          CTA [facility]:    ANTERIOR CIRCULATION    ICA  CAVERNOUS, SUPRACLINOID, BIFURCATION SEGMENTS: Patent without flow limiting stenosis. There is a 1.4 mm left P-comm aneurysm versus infundibulum.    ANTERIOR CEREBRAL ARTERIES: Bilateral A1, anterior communicating and A2 anterior cerebral arteries are unremarkable in course and caliber without flow limiting stenosis.    MIDDLE CEREBRAL ARTERIES: Patent bilateral M1, M2, and distal MCA branches without flow limiting stenosis. There is a 2.0 mm outpouching at the left MCA bifurcation consistent with an aneurysm.    POSTERIOR CIRCULATION:    VERTEBRAL ARTERIES: Patent without flow limiting stenosis    BASILAR ARTERY: Patent no flow limiting stenosis.    POSTERIOR CEREBRAL ARTERIES: Patent without flow limiting stenosis.    CTA NECK:    GREAT VESSELS: Visualized segments are patent, no flow limiting stenosis.    COMMON CAROTID ARTERIES:  RIGHT: Patent without flow limiting stenosis  LEFT: Patent without flow limiting stenosis    INTERNAL CAROTID ARTERIES:  RIGHT: Patent no evidence for any hemodynamically significant stenosis at the ICA origin by NASCET criteria.  LEFT: Patent no evidence for any hemodynamically significant stenosis at the ICA origin by NASCET criteria.    VERTEBRAL ARTERIES:    RIGHT: Patent no evidence for any flow limiting stenosis.  LEFT: Patent no evidence for any flow limiting stenosis.      SOFT TISSUES: Unremarkable    BONES: Degenerative changes.    IMPRESSION:  HEAD CT: No acute intracranial hemorrhage or acute territorial infarction.    CT PERFUSION demonstrated: No asymmetric or territorial perfusion abnormality.    If symptoms persist consider follow up head CT or MRI, MRA  if no contraindication.    CTA COW:  Patent intracranial circulation without flow limiting stenosis or large vessel occlusion. There is a 1.4 mm left P-comm aneurysm versus infundibulum. 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    CTA NECK: Patent, ECAs, ICAs, no  hemodynamically significant stenosis at  ICA origins by NASCET criteria.  Bilateral vertebral arteries are patent without flow limiting stenosis.      Notification to clinician of alert:  Dr. Marina was notified about the noncontrast head CT findings at 1:48 PM on 2021 with readback confirmation. The opportunity for questions was provided and all questions asked were answered.      MRI:  Pending  ECHO FINDINGS: Date:                  :     Summary:   1. Technically suboptimal study. Endocardial visualization was enhanced with intravenous echo contrast.   2. Mildly enlarged left atrium.   3. Moderately sized patent foramen ovale, with predominantly right to left shunting across the atrial septum.   4. Intravenous injection of agitated saline demonstrates the presence of a patent foramen ovale.   5. There is mild concentric left ventricular hypertrophy.   6. Normal wall motion. Left ventricular ejection fraction, by visual estimation, is 60 to 65%. Grade I diastolic dysfunction.   7. Normal right atrial size.   8. Normal right ventricular size and function.   9. No significant valvular abnormality.  10. There is no evidence of pericardial effusion.

## 2021-11-21 NOTE — H&P ADULT - NSHPLABSRESULTS_GEN_ALL_CORE
LABS:                        14.2   10.60 )-----------( 289      ( 21 Nov 2021 12:57 )             43.5     11-21    141  |  102  |  15.2  ----------------------------<  181<H>  4.0   |  23.0  |  0.46<L>    Ca    9.3      21 Nov 2021 12:57    TPro  8.1  /  Alb  4.2  /  TBili  0.3<L>  /  DBili  x   /  AST  23  /  ALT  29  /  AlkPhos  156<H>  11-21    PT/INR - ( 21 Nov 2021 12:57 )   PT: 12.3 sec;   INR: 1.06 ratio         PTT - ( 21 Nov 2021 12:57 )  PTT:30.8 sec  CARDIAC MARKERS ( 21 Nov 2021 12:57 )  x     / <0.01 ng/mL / x     / x     / x    < from: CT Angio Neck w/ IV Cont (11.21.21 @ 13:11) >    IMPRESSION:  HEAD CT: No acute intracranial hemorrhage or acute territorial infarction.    CT PERFUSION demonstrated: No asymmetric or territorial perfusion abnormality.    If symptoms persist consider follow up head CT or MRI, MRA  if no contraindication.    CTA COW:  Patent intracranial circulation without flow limiting stenosis or large vessel occlusion. There is a 1.4 mm left P-comm aneurysm versus infundibulum. 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    CTA NECK: Patent, ECAs, ICAs, no  hemodynamically significant stenosis at  ICA origins by NASCET criteria.  Bilateral vertebral arteries are patent without flow limiting stenosis.    < end of copied text > LABS:                        14.2   10.60 )-----------( 289      ( 21 Nov 2021 12:57 )             43.5     11-21    141  |  102  |  15.2  ----------------------------<  181<H>  4.0   |  23.0  |  0.46<L>    Ca    9.3      21 Nov 2021 12:57    TPro  8.1  /  Alb  4.2  /  TBili  0.3<L>  /  DBili  x   /  AST  23  /  ALT  29  /  AlkPhos  156<H>  11-21    PT/INR - ( 21 Nov 2021 12:57 )   PT: 12.3 sec;   INR: 1.06 ratio         PTT - ( 21 Nov 2021 12:57 )  PTT:30.8 sec  CARDIAC MARKERS ( 21 Nov 2021 12:57 )  x     / <0.01 ng/mL / x     / x     / x    < from: CT Angio Neck w/ IV Cont (11.21.21 @ 13:11) >    IMPRESSION:  HEAD CT: No acute intracranial hemorrhage or acute territorial infarction.    CT PERFUSION demonstrated: No asymmetric or territorial perfusion abnormality.    If symptoms persist consider follow up head CT or MRI, MRA  if no contraindication.    CTA COW:  Patent intracranial circulation without flow limiting stenosis or large vessel occlusion. There is a 1.4 mm left P-comm aneurysm versus infundibulum. 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    CTA NECK: Patent, ECAs, ICAs, no  hemodynamically significant stenosis at  ICA origins by NASCET criteria.  Bilateral vertebral arteries are patent without flow limiting stenosis.    < end of copied text >    EKG: NSR, HR 80, qtc 449

## 2021-11-21 NOTE — H&P ADULT - NSHPSOCIALHISTORY_GEN_ALL_CORE
patient reports smoking on the weekends, denies ETOH nor illicit drug use.    Daughter Pily pulled provider aside after interview, and informed provider that patient smokes cocaine on the weekend and uses ETOH on the weekend and sometimes during one weekday as well.

## 2021-11-21 NOTE — H&P ADULT - NSHPPHYSICALEXAM_GEN_ALL_CORE
VITALS:   T(C): --  HR: 76 (11-21-21 @ 13:15) (76 - 76)  BP: 172/90 (11-21-21 @ 13:15) (166/125 - 172/90)  RR: 18 (11-21-21 @ 13:15) (18 - 18)  SpO2: 98% (11-21-21 @ 13:15) (98% - 98%)    GENERAL: NAD, lying in bed comfortably  HEAD:  Atraumatic, Normocephalic  EYES: Unable to obtain full EOMI exam due to dizziness, PERRLA, conjunctiva and sclera clear  ENT: Moist mucous membranes  NECK: Supple, No JVD  CHEST/LUNG: Clear to auscultation bilaterally; No rales, rhonchi, wheezing, or rubs. Unlabored respirations  HEART: Regular rate and rhythm; No murmurs, rubs, or gallops  ABDOMEN: BSx4; Soft, nontender, nondistended  EXTREMITIES:  2+ Peripheral Pulses, brisk capillary refill. No clubbing, cyanosis, or edema  NERVOUS SYSTEM:  A&Ox3, + Right sided facial droop and left facial nerve (buccal branch) numbness  SKIN: No rashes or lesions  PSYCH: Normal affect, euthymic mood VITALS:   T(C): --  HR: 76 (11-21-21 @ 13:15) (76 - 76)  BP: 172/90 (11-21-21 @ 13:15) (166/125 - 172/90)  RR: 18 (11-21-21 @ 13:15) (18 - 18)  SpO2: 98% (11-21-21 @ 13:15) (98% - 98%)    GENERAL: NAD, lying in bed comfortably  HEAD:  Atraumatic, Normocephalic  EYES: Unable to obtain full EOMI exam due to dizziness, PERRLA, conjunctiva and sclera clear  ENT: Moist mucous membranes  NECK: Supple, No JVD  CHEST/LUNG: Clear to auscultation bilaterally; No rales, rhonchi, wheezing, or rubs. Unlabored respirations  HEART: Regular rate and rhythm; No murmurs, rubs, or gallops  ABDOMEN: BSx4; Soft, nontender, nondistended  EXTREMITIES:  2+ Peripheral Pulses, brisk capillary refill. No clubbing, cyanosis, or edema  NERVOUS SYSTEM:  A&Ox3, minimal facial droop. Left facial nerve (buccal branch) numbness  SKIN: No rashes or lesions  PSYCH: Normal affect, euthymic mood

## 2021-11-21 NOTE — H&P ADULT - HISTORY OF PRESENT ILLNESS
50yo F w/pmh DM, HTN and recent observation stay for dizziness p/w persistent dizziness as well as left sided weakness and numbness. Per patient, she was in the ED for dizziness last week, which never really resolved by the time she was discharged. CTH and MRI during that admission was negative. Patient was started on statin and ASA and sent home for outpatient workup. She reports worsening dizziness with associated chest pressure since last night with nausea and NBNB emesis. Went to sleep and woke up this AM withe left sided weakness as well as left facial and numbness. Reports overall exhaustion this AM, and came to the ED for further evaluation.   ROS notable for dizziness (lightheadedness), substernal chest pressure, emesis, as well as diffuse headache.     ED vitals with /125, labs WNL. Noted with right facial droop s/p code stroke. Admitted for further management.   Patient personally denies ETOH/illicit drug use. However, daughter reports that her mother does use cocaine on the weekends and sometimes drinks on the weekend as well.

## 2021-11-21 NOTE — CONSULT NOTE ADULT - ASSESSMENT
52yo F w/pmh DM, HTN and recent observation stay for dizziness p/w persistent dizziness as well as left sided weakness and numbness. Per patient, she was in the ED for dizziness last week, which never really resolved by the time she was discharged. CTH and MRI during that admission was negative. Patient was started on statin and ASA and sent home for outpatient workup. She was scheduled to follow up with cardiology outpatient for PFO.  She reports worsening dizziness with associated chest pressure since last night with nausea and NBNB emesis, Chest pain 2/10,  midsternal radiating to under breast, relieved with rest. . Went to sleep and woke up this AM withe left sided weakness as well as left facial and numbness. Reports overall exhaustion this AM, with dizziness my head spinning and chest pain 5/10 non radiating, relieved with rest, + n/v as well, so I came to the ED for further evaluation.      Stroke/recurrent TIA  Neurology following and recommended  - repeat MRI  - CATY/ILR, in am  - NPO at midnight  - LDL 69, continue Lipitor    Angina  Trop negative x 1, continue to trend  CATY in am  npo  - ASA 81 Plavix 75  Lisinopril 10mg po dialy  Lipitor 80mg po qhs    Diabetes -   HgbA1c= 7.6% needs tight glucose control  sliding scale.         52yo F w/pmh DM, HTN and recent observation stay for dizziness p/w persistent dizziness as well as left sided weakness and numbness. Per patient, she was in the ED for dizziness last week, which never really resolved by the time she was discharged. CTH and MRI during that admission was negative. Patient was started on statin and ASA and sent home for outpatient workup. She was scheduled to follow up with cardiology outpatient for PFO.  She reports worsening dizziness with associated chest pressure since last night with nausea and NBNB emesis, Chest pain 2/10,  midsternal radiating to under breast, relieved with rest. . Went to sleep and woke up this AM withe left sided weakness as well as left facial and numbness. Reports overall exhaustion this AM, with dizziness my head spinning and chest pain 5/10 non radiating, relieved with rest, + n/v as well, so I came to the ED for further evaluation.      Stroke/recurrent TIA  Neurology following and recommended  - repeat MRI  - CATY/ILR, in am  - NPO at midnight  - LDL 69, continue Lipitor    Angina  Trop negative x 1, continue to trend  CATY in am  npo  - ASA 81 Plavix 75  Lisinopril 10mg po dialy  Lipitor 80mg po qhs  Eventually ischemic work up    Diabetes -   HgbA1c= 7.6% needs tight glucose control  sliding scale.         50yo F w/pmh DM, HTN and recent observation stay for dizziness p/w persistent dizziness as well as left sided weakness and numbness. Per patient, she was in the ED for dizziness last week, which never really resolved by the time she was discharged. CTH and MRI during that admission was negative. Patient was started on statin and ASA and sent home for outpatient workup. She was scheduled to follow up with cardiology outpatient for PFO.  She reports worsening dizziness with associated chest pressure since last night with nausea and NBNB emesis, Chest pain 2/10,  midsternal radiating to under breast, relieved with rest. . Went to sleep and woke up this AM withe left sided weakness as well as left facial and numbness. Reports overall exhaustion this AM, with dizziness my head spinning and chest pain 5/10 non radiating, relieved with rest, + n/v as well, so I came to the ED for further evaluation.      Stroke/recurrent TIA  Neurology following and recommended  - repeat MRI  - CATY/ILR, in am  - NPO at midnight  - LDL 69, continue Lipitor    Chest Pain  Trop negative x 1, continue to trend  CATY in am  npo  - ASA 81 Plavix 75  Lisinopril 10mg po dialy  Lipitor 80mg po qhs  Eventually ischemic work up    Diabetes -   HgbA1c= 7.6% needs tight glucose control  sliding scale.

## 2021-11-21 NOTE — ED PROVIDER NOTE - OBJECTIVE STATEMENT
50yo F w/pmh DM presents for dizziness, nausea, L-sided body weakness, R-sided facial droop. Reports onset of dizziness and nausea 7pm last night. Onset of weakness 20min ago. AAOx3, denies aphasia, dysarthria, sensory deficits. 52yo F w/pmh DM presents for dizziness, nausea, L-sided body weakness, R-sided facial droop. Reports onset of dizziness and nausea 7pm last night. Onset of weakness 11:30 this morning. AAOx3, denies aphasia, dysarthria, sensory deficits. 52yo F w/pmh DM presents for dizziness, nausea, L-sided body weakness, R-sided facial droop. Reports onset of dizziness and nausea 7pm last night. Onset of weakness 11:30 this morning. AAOx3, denies aphasia, dysarthria, sensory deficits. also with complaint of chest pain and posisble syncope.

## 2021-11-21 NOTE — ED ADULT TRIAGE NOTE - CHIEF COMPLAINT QUOTE
pt reports she was admitted on Monday and discharged on Fridays for same symptoms she is currently having. Pt c/o headache, n/n, dizziness and chest pains. Daughter reports patient was fine on friday and yesterday at 12pm, she began feeling chest pains, headache, dizziness, n/v. EKG done, Priority CT called, patient taken back to CT scan Dr Marina at bedside.

## 2021-11-21 NOTE — ED PROVIDER NOTE - NS ED MD DISPO SPECIAL CONSIDERATION1
None Keystone Flap Text: The defect edges were debeveled with a #15 scalpel blade.  Given the location of the defect, shape of the defect a keystone flap was deemed most appropriate.  Using a sterile surgical marker, an appropriate keystone flap was drawn incorporating the defect, outlining the appropriate donor tissue and placing the expected incisions within the relaxed skin tension lines where possible. The area thus outlined was incised deep to adipose tissue with a #15 scalpel blade.  The skin margins were undermined to an appropriate distance in all directions around the primary defect and laterally outward around the flap utilizing iris scissors.

## 2021-11-21 NOTE — ED PROVIDER NOTE - PHYSICAL EXAMINATION
General: tearful, sitting in wheelchair  Head:  NC, AT  Eyes: EOMI, PERRLA, no scleral icterus  Cardiac: RRR, no m/r/g  Respiratory: CTABL, no wheezes/rales/rhonchi, equal chest wall expansions  Abdomen: soft, ND, NT  MSK/Vascular: distal pulses intact, soft compartments, warm extremities  Neuro: AAOx3, +L pronator drift, strength 5/5 RUE, 1/5 LUE, 5/5 RLE, 4/5 LLE, sensation to light touch intact, +R-sided facial droop  Psych: tearful, cooperative

## 2021-11-21 NOTE — ED ADULT NURSE NOTE - OBJECTIVE STATEMENT
pt comes to ED awake and alert, reporting dizziness with movement and right side facial droop. states was feeling ill with these symptoms since last night, unknown LKW. upon arrival, pt reports she began having left side arm and leg weakness and was unable to ambulate, with these symptoms beginning @ 1130. pt recently here for similar symptoms and dx with PFO. has been anticoagulated with ASA only. pt AOX3, skin warm dry and intact. even and unlabored resps p[resent. unable to move left arm and left leg on this RN exam and code stroke activated by ER resident.

## 2021-11-21 NOTE — CONSULT NOTE ADULT - ATTENDING COMMENTS
Pt is seen, examined, chart reviewed, d/w np/pa.  Management as outlined above.  Stroke/recurrent TIA:    CATY, possible ILR, in am. NPO at midnight.    Evaluation for PFO closure given recurrent symptoms  ASA, Lipid management  Chest Pain: serial CE, Eventually ischemic work up

## 2021-11-21 NOTE — CONSULT NOTE ADULT - SUBJECTIVE AND OBJECTIVE BOX
[facility]                                     Neu[facility] at [place]                                 Primitivo Kunz, & Vikas                                  [address]                                        [place], [place], [address]                                             [phone]    ED  utilized for history and exam    CC: stroke code  HPI:    Patient personally denies ETOH/illicit drug use. However, daughter reports that her mother does use cocaine on the weekends and sometimes drinks on the weekend as well.  (2021 14:38)  The patient is a 51y Female who presented with dizziness and left sided weakness.  She was seen in hospital for similar symptoms on Friday and had neagtibve CTA/P, MRI brain and echo showed PFO. She was feeling worse this morning with worsening weakness of left sideand dizziness, not vertiginous and headache.  A code stroke was called, telestroke was initially consulted  and I was asked to consult and follow.    PAST MEDICAL & SURGICAL HISTORY:  HTN (hypertension)    HLD (hyperlipidemia)    Diabetes    H/O:         MEDICATIONS  (STANDING):  atorvastatin 80 milliGRAM(s) Oral at bedtime  dextrose 40% Gel 15 Gram(s) Oral once  dextrose 5%. 1000 milliLiter(s) (50 mL/Hr) IV Continuous <Continuous>  dextrose 5%. 1000 milliLiter(s) (100 mL/Hr) IV Continuous <Continuous>  dextrose 50% Injectable 25 Gram(s) IV Push once  dextrose 50% Injectable 12.5 Gram(s) IV Push once  dextrose 50% Injectable 25 Gram(s) IV Push once  glucagon  Injectable 1 milliGRAM(s) IntraMuscular once  insulin lispro (ADMELOG) corrective regimen sliding scale   SubCutaneous every 6 hours  lisinopril 10 milliGRAM(s) Oral daily  venlafaxine XR. 75 milliGRAM(s) Oral daily    MEDICATIONS  (PRN):      Allergies    No Known Allergies    Intolerances        SOCIAL HISTORY:  no tob,   no alcohol   no drugs    FAMILY HISTORY:  Family history of hypertension in mother  she is alive 83 yo    Family history of coronary artery disease (Father)  father  at age 64 from MI          ROS: 14 point ROS negative other than what is present in HPI or below    Vital Signs Last 24 Hrs  T(C): 36.6 (2021 15:33), Max: 36.6 (2021 15:33)  T(F): 97.8 (2021 15:33), Max: 97.8 (2021 15:33)  HR: 76 (2021 15:33) (76 - 76)  BP: 148/95 (2021 15:33) (148/95 - 172/90)  BP(mean): --  RR: 18 (2021 15:33) (18 - 18)  SpO2: 96% (2021 15:33) (96% - 98%)      General: NAD    Detailed Neurologic Exam:    Mental status: The patient is awake and alert and has normal attention span.  The patient is fully oriented in 3 spheres. The patient is oriented to current events. The patient is able to name objects, follow commands, repeat sentences.    Cranial nerves: Pupils equal and react symmetrically to light. There is no visual field deficit to confrontation. Extraocular motion is full with no nystagmus. There is no ptosis. Facial sensation is intact. Facial musculature is symmetric. Palate elevates symmetrically. Tongue is midline.    Motor: There is normal bulk and tone.  There is no tremor.  Strength is 5/5 in the right arm and leg.   Strength is 5/5 in the left arm and leg. slightl reduced  on left    Sensation: Intact to light touch and pin in 4 extremities    Reflexes: 1-+ throughout and plantar responses are flexor.    Cerebellar: There is no dysmetria on finger to nose testing.    Gait : deferred    [facility] SS:  DATE: 21  TIME: 1545  1A: Level of consciousness (0-3): 0  1B: Questions (0-2): 0  1C: Commands (0-2): 0  2: Gaze (0-2): 0  3: Visual fields (0-3): 0  4: Facial palsy (0-3): 0  MOTOR:  5A: Left arm motor drift (0-4): 0  5B: Right arm motor drift (0-4): 0  6A: Left leg motor drift (0-4): 0  6B: Right leg motor drift (0-4): 0  7: Limb ataxia (0-2): 0  SENSORY:  8: Sensation (0-2): 0  SPEECH:  9: Language (0-3): 0  10: Dysarthria (0-2): 0  EXTINCTION:  11: Extinction/inattention (0-2): 0    TOTAL SCORE: 0        LABS:                         14.2   10.60 )-----------( 289      ( 2021 12:57 )             43.5           141  |  102  |  15.2  ----------------------------<  181<H>  4.0   |  23.0  |  0.46<L>    Ca    9.3      2021 12:57    TPro  8.1  /  Alb  4.2  /  TBili  0.3<L>  /  DBili  x   /  AST  23  /  ALT  29  /  AlkPhos  156<H>        PT/INR - ( 2021 12:57 )   PT: 12.3 sec;   INR: 1.06 ratio         PTT - ( 2021 12:57 )  PTT:30.8 sec    Lipid Profile in AM (21 @ 06:14)    LDL Cholesterol Calculated: 69 mg/dL    A1C with Estimated Average Glucose in AM (21 @ 06:14)    A1C with Estimated Average Glucose Result: 7.6 %    Estimated Average Glucose: 171 mg/dL        RADIOLOGY & ADDITIONAL STUDIES (independently reviewed unless otherwise noted):  CT head: no acute CVA, mass or blood  CTA head: no , AVM, LVO or sig stenosis in COW.  1.4 mm left P-comm aneurysm, 2.o mm left MCA bifurcation aneurysm  CTA neck: no sig carotid or vertebral stenosis  CT Perfusion head - CBF<30% volume 0ml, Tmax>6s volume =0ml    < from: US Duplex Venous Lower Ext Complete, Bilateral (21 @ 18:57) >  IMPRESSION:  No evidence of deep venous thrombosis in either lower extremity.

## 2021-11-21 NOTE — H&P ADULT - NSHPREVIEWOFSYSTEMS_GEN_ALL_CORE
REVIEW OF SYSTEMS:    CONSTITUTIONAL: No weakness, fevers or chills  EYES: No vertigo or throat pain  ENT: No visual changes, eye pain  MOUTH: moist catrachita mucosal, no mouth ulcers  NECK: No pain or stiffness  RESPIRATORY: No cough, wheezing, hemoptysis; No shortness of breath  CARDIOVASCULAR: + chest pressure. No palpitations  GASTROINTESTINAL: No abdominal or epigastric pain. No nausea, vomiting, or hematemesis; No diarrhea or constipation. No melena or hematochezia.  GENITOURINARY: No dysuria, frequency or hematuria  NEUROLOGICAL: + Left sided numbness or weakness, left facial numbness. + Dizziness and headache  SKIN: No itching, rashes  PSYCH: No anxiety or depression

## 2021-11-22 LAB
ALBUMIN SERPL ELPH-MCNC: 3.8 G/DL — SIGNIFICANT CHANGE UP (ref 3.3–5.2)
ALP SERPL-CCNC: 140 U/L — HIGH (ref 40–120)
ALT FLD-CCNC: 30 U/L — SIGNIFICANT CHANGE UP
ANION GAP SERPL CALC-SCNC: 12 MMOL/L — SIGNIFICANT CHANGE UP (ref 5–17)
APPEARANCE UR: CLEAR — SIGNIFICANT CHANGE UP
AST SERPL-CCNC: 25 U/L — SIGNIFICANT CHANGE UP
BACTERIA # UR AUTO: ABNORMAL
BILIRUB SERPL-MCNC: 0.3 MG/DL — LOW (ref 0.4–2)
BILIRUB UR-MCNC: NEGATIVE — SIGNIFICANT CHANGE UP
BUN SERPL-MCNC: 21.9 MG/DL — HIGH (ref 8–20)
CALCIUM SERPL-MCNC: 9 MG/DL — SIGNIFICANT CHANGE UP (ref 8.6–10.2)
CHLORIDE SERPL-SCNC: 99 MMOL/L — SIGNIFICANT CHANGE UP (ref 98–107)
CHOLEST SERPL-MCNC: 150 MG/DL — SIGNIFICANT CHANGE UP
CO2 SERPL-SCNC: 26 MMOL/L — SIGNIFICANT CHANGE UP (ref 22–29)
COLOR SPEC: YELLOW — SIGNIFICANT CHANGE UP
COVID-19 NUCLEOCAPSID GAM AB INTERP: POSITIVE
COVID-19 NUCLEOCAPSID TOTAL GAM ANTIBODY RESULT: 29.3 INDEX — HIGH
COVID-19 SPIKE DOMAIN AB INTERP: POSITIVE
COVID-19 SPIKE DOMAIN ANTIBODY RESULT: >250 U/ML — HIGH
CREAT SERPL-MCNC: 0.6 MG/DL — SIGNIFICANT CHANGE UP (ref 0.5–1.3)
DIFF PNL FLD: ABNORMAL
EPI CELLS # UR: ABNORMAL
ERYTHROCYTE [SEDIMENTATION RATE] IN BLOOD: 35 MM/HR — HIGH (ref 0–20)
GGT SERPL-CCNC: 59 U/L — HIGH (ref 8–40)
GLUCOSE BLDC GLUCOMTR-MCNC: 113 MG/DL — HIGH (ref 70–99)
GLUCOSE BLDC GLUCOMTR-MCNC: 127 MG/DL — HIGH (ref 70–99)
GLUCOSE BLDC GLUCOMTR-MCNC: 149 MG/DL — HIGH (ref 70–99)
GLUCOSE BLDC GLUCOMTR-MCNC: 152 MG/DL — HIGH (ref 70–99)
GLUCOSE BLDC GLUCOMTR-MCNC: 185 MG/DL — HIGH (ref 70–99)
GLUCOSE SERPL-MCNC: 155 MG/DL — HIGH (ref 70–99)
GLUCOSE UR QL: NEGATIVE MG/DL — SIGNIFICANT CHANGE UP
HCT VFR BLD CALC: 41.2 % — SIGNIFICANT CHANGE UP (ref 34.5–45)
HDLC SERPL-MCNC: 43 MG/DL — LOW
HGB BLD-MCNC: 12.9 G/DL — SIGNIFICANT CHANGE UP (ref 11.5–15.5)
KETONES UR-MCNC: ABNORMAL
LEUKOCYTE ESTERASE UR-ACNC: NEGATIVE — SIGNIFICANT CHANGE UP
LIPID PNL WITH DIRECT LDL SERPL: 80 MG/DL — SIGNIFICANT CHANGE UP
MAGNESIUM SERPL-MCNC: 2.3 MG/DL — SIGNIFICANT CHANGE UP (ref 1.6–2.6)
MCHC RBC-ENTMCNC: 28.6 PG — SIGNIFICANT CHANGE UP (ref 27–34)
MCHC RBC-ENTMCNC: 31.3 GM/DL — LOW (ref 32–36)
MCV RBC AUTO: 91.4 FL — SIGNIFICANT CHANGE UP (ref 80–100)
NITRITE UR-MCNC: NEGATIVE — SIGNIFICANT CHANGE UP
NON HDL CHOLESTEROL: 107 MG/DL — SIGNIFICANT CHANGE UP
PH UR: 5 — SIGNIFICANT CHANGE UP (ref 5–8)
PHOSPHATE SERPL-MCNC: 4.1 MG/DL — SIGNIFICANT CHANGE UP (ref 2.4–4.7)
PLATELET # BLD AUTO: 277 K/UL — SIGNIFICANT CHANGE UP (ref 150–400)
POTASSIUM SERPL-MCNC: 3.9 MMOL/L — SIGNIFICANT CHANGE UP (ref 3.5–5.3)
POTASSIUM SERPL-SCNC: 3.9 MMOL/L — SIGNIFICANT CHANGE UP (ref 3.5–5.3)
PROT SERPL-MCNC: 7.2 G/DL — SIGNIFICANT CHANGE UP (ref 6.6–8.7)
PROT UR-MCNC: 30 MG/DL
RBC # BLD: 4.51 M/UL — SIGNIFICANT CHANGE UP (ref 3.8–5.2)
RBC # FLD: 12.4 % — SIGNIFICANT CHANGE UP (ref 10.3–14.5)
RBC CASTS # UR COMP ASSIST: ABNORMAL /HPF (ref 0–4)
SARS-COV-2 IGG+IGM SERPL QL IA: 29.3 INDEX — HIGH
SARS-COV-2 IGG+IGM SERPL QL IA: >250 U/ML — HIGH
SARS-COV-2 IGG+IGM SERPL QL IA: POSITIVE
SARS-COV-2 IGG+IGM SERPL QL IA: POSITIVE
SARS-COV-2 RNA SPEC QL NAA+PROBE: SIGNIFICANT CHANGE UP
SODIUM SERPL-SCNC: 137 MMOL/L — SIGNIFICANT CHANGE UP (ref 135–145)
SP GR SPEC: 1.02 — SIGNIFICANT CHANGE UP (ref 1.01–1.02)
TRIGL SERPL-MCNC: 135 MG/DL — SIGNIFICANT CHANGE UP
UROBILINOGEN FLD QL: NEGATIVE MG/DL — SIGNIFICANT CHANGE UP
WBC # BLD: 9.38 K/UL — SIGNIFICANT CHANGE UP (ref 3.8–10.5)
WBC # FLD AUTO: 9.38 K/UL — SIGNIFICANT CHANGE UP (ref 3.8–10.5)
WBC UR QL: NEGATIVE — SIGNIFICANT CHANGE UP

## 2021-11-22 PROCEDURE — 99233 SBSQ HOSP IP/OBS HIGH 50: CPT

## 2021-11-22 PROCEDURE — 99232 SBSQ HOSP IP/OBS MODERATE 35: CPT

## 2021-11-22 RX ORDER — SODIUM CHLORIDE 9 MG/ML
1000 INJECTION INTRAMUSCULAR; INTRAVENOUS; SUBCUTANEOUS
Refills: 0 | Status: DISCONTINUED | OUTPATIENT
Start: 2021-11-22 | End: 2021-11-23

## 2021-11-22 RX ORDER — ONDANSETRON 8 MG/1
4 TABLET, FILM COATED ORAL EVERY 6 HOURS
Refills: 0 | Status: DISCONTINUED | OUTPATIENT
Start: 2021-11-22 | End: 2021-11-24

## 2021-11-22 RX ORDER — ACETAMINOPHEN 500 MG
650 TABLET ORAL EVERY 6 HOURS
Refills: 0 | Status: DISCONTINUED | OUTPATIENT
Start: 2021-11-22 | End: 2021-11-24

## 2021-11-22 RX ORDER — HEPARIN SODIUM 5000 [USP'U]/ML
5000 INJECTION INTRAVENOUS; SUBCUTANEOUS EVERY 12 HOURS
Refills: 0 | Status: DISCONTINUED | OUTPATIENT
Start: 2021-11-22 | End: 2021-11-24

## 2021-11-22 RX ADMIN — Medication 1: at 10:50

## 2021-11-22 RX ADMIN — ATORVASTATIN CALCIUM 80 MILLIGRAM(S): 80 TABLET, FILM COATED ORAL at 22:06

## 2021-11-22 RX ADMIN — LISINOPRIL 10 MILLIGRAM(S): 2.5 TABLET ORAL at 05:29

## 2021-11-22 RX ADMIN — SODIUM CHLORIDE 60 MILLILITER(S): 9 INJECTION INTRAMUSCULAR; INTRAVENOUS; SUBCUTANEOUS at 15:05

## 2021-11-22 RX ADMIN — ONDANSETRON 4 MILLIGRAM(S): 8 TABLET, FILM COATED ORAL at 15:05

## 2021-11-22 RX ADMIN — CLOPIDOGREL BISULFATE 75 MILLIGRAM(S): 75 TABLET, FILM COATED ORAL at 12:20

## 2021-11-22 RX ADMIN — Medication 1: at 07:07

## 2021-11-22 RX ADMIN — Medication 81 MILLIGRAM(S): at 12:20

## 2021-11-22 RX ADMIN — Medication 75 MILLIGRAM(S): at 12:20

## 2021-11-22 RX ADMIN — Medication 650 MILLIGRAM(S): at 22:26

## 2021-11-22 RX ADMIN — HEPARIN SODIUM 5000 UNIT(S): 5000 INJECTION INTRAVENOUS; SUBCUTANEOUS at 17:45

## 2021-11-22 NOTE — PATIENT PROFILE ADULT - NSPROMEDSBROUGHTTOHOSP_GEN_A_NUR
Patient: Florentin Mcnamara    Gerardo Watts MD Provider:  Ramy River 
no

## 2021-11-22 NOTE — OCCUPATIONAL THERAPY INITIAL EVALUATION ADULT - LIVES WITH, PROFILE
Pt reports lives in a private house with her  who is able to assist but who works. 1 threshold step to enter + pillar to hold onto, no steps inside to negotiate./spouse

## 2021-11-22 NOTE — OCCUPATIONAL THERAPY INITIAL EVALUATION ADULT - SPECIAL TRAINING, OT EVAL
Pt ambulated independently around bed area, to/from the bathroom and in the hallway demonstrating good safety awareness and obstacle negotiation. Pt educated in energy conservation techniques including proper breathing and activity pacing.

## 2021-11-22 NOTE — OCCUPATIONAL THERAPY INITIAL EVALUATION ADULT - GENERAL OBSERVATIONS, REHAB EVAL
Patient is [place] speaking, use of live  ([place]) for evaluation. Received pt semifowler in bed, NAD, +IV lock, +Tele/, +on RA.Pre/post pain level 0/10. Pt states no dizziness just feels generalized weakness throughout. Patient agreeable to OT evaluation.

## 2021-11-22 NOTE — OCCUPATIONAL THERAPY INITIAL EVALUATION ADULT - DIAGNOSIS, OT EVAL
51 year old Female presented with persistent dizziness as well as left sided weakness/numbness and Left facial numbness +chest pain, admitted for TIA workup.

## 2021-11-22 NOTE — CONSULT NOTE ADULT - ASSESSMENT
52 y/o F PMH DM, HTN and recent observation stay for dizziness p/w persistent dizziness as well as left sided weakness and numbness. Patient was discharged home and returned to the ER yesterday. Neurosurgery called for an incidental finding of a L PCOMM aneurysm vs infundibulum and a L MCA aneurysm. Patient currently complaining of headache and LLE numbness.     Plan:  -D/w Dr. Lynch  -Imaging reviewed  -No acute neurosurgical intervention   -SBP < 140  -Pain control PRN  -Follow up with Dr. Lynch in his office once discharged from the hospital  -Neurosurgery signing off; please re-call as needed with any further questions/concerns

## 2021-11-22 NOTE — PATIENT PROFILE ADULT - NSPROPTRIGHTNOTIFY_GEN_A_NUR
declines Consent (Near Eyelid Margin)/Introductory Paragraph: The rationale for Mohs was explained to the patient and consent was obtained. The risks, benefits and alternatives to therapy were discussed in detail. Specifically, the risks of ectropion or eyelid deformity, infection, scarring, bleeding, prolonged wound healing, incomplete removal, allergy to anesthesia, nerve injury and recurrence were addressed. Prior to the procedure, the treatment site was clearly identified and confirmed by the patient. All components of Universal Protocol/PAUSE Rule completed.

## 2021-11-22 NOTE — OCCUPATIONAL THERAPY INITIAL EVALUATION ADULT - COORDINATION ASSESSED, REHAB EVAL
Bilateral UE/LE intact however Left UE/LE required additional time and effort/finger to nose/heel to shin

## 2021-11-22 NOTE — OCCUPATIONAL THERAPY INITIAL EVALUATION ADULT - SOCIAL CONCERNS
Complex psychosocial needs/coping issues You can access the [facility] Patient Portal, offered by [facility], by registering with the following website: [email or web address]

## 2021-11-22 NOTE — PROGRESS NOTE ADULT - ASSESSMENT
52yo F w/pmh DMT2, HTN and recent observation stay for dizziness p/w persistent dizziness as well as left sided weakness and numbness, admitted for TIA workup.     #TIA   - w/ associated dizziness, reported right sided facial droop and left sided facial numbness and Left extremities weakness  - CTH/neck and angio:   There is a 1.4 mm left P-comm aneurysm versus infundibulum. 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 No acute infacrt  - will down grade to stroke w/q4h neurocheck  - c/w ASA and Plavix and Statin  - monitor on tele  - TTE 11/19: moderately sized patent foramen ovale, with predominantly right to left shunting across the atrial septum and DVT study negative   - MRI head pending   - [place] cards consulted for LUIS A with possible PFO closure, salome recs. plan for luis a today  - f/u blood ETOH and Utox level  -c/s neurosurgery  -neurology is following  -mri brain    #Moderate PFO:  - cards consulted for LUIS A, salome recs. plan for LUIS A today    #Brain aneurysm - noted on CTH as above  - c/s neurosurgery    #HTN   - on Lisinopril 5mg at home  - BP currently poorly controlled with SBP > 170  -  Lisinopril to 10mg QD    #DMT2  - hold metformin  - A1C 7.6  - start ISS    #Elevated AP  - f/u AM GGT  - daily CMP for now     #Leukocytosis  - suspect reactive, improved from last admission  - daily CBC    #DVT: ASA/Plavix and SCD  #Diet: Pending LUIS A,MRI BRAIN and further cardiac rec  Dispo: pending clinical course  care of plan lisa pt. dw rn 50yo F w/pmh DMT2, HTN and recent observation stay for dizziness p/w persistent dizziness as well as left sided weakness and numbness, admitted for TIA workup.     #TIA   - w/ associated dizziness, reported right sided facial droop and left sided facial numbness and Left extremities weakness  - CTH/neck and angio:   There is a 1.4 mm left P-comm aneurysm versus infundibulum. 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 No acute infacrt  - will down grade to stroke w/q4h neurocheck  - c/w ASA and Plavix and Statin  - monitor on tele  - TTE 11/19: moderately sized patent foramen ovale, with predominantly right to left shunting across the atrial septum and DVT study negative   - MRI head pending   - [place] cards consulted for LUIS A with possible PFO closure, salome recs. plan for luis a today  - f/u blood ETOH and Utox level  -c/s neurosurgery  -neurology is following  -mri brain    #Moderate PFO:  - cards consulted for LUIS A, salome recs. plan for LUIS A today    #Brain aneurysm - noted on CTH as above  - c/s neurosurgery    #HTN   - on Lisinopril 5mg at home  - BP currently poorly controlled with SBP > 170  -  Lisinopril to 10mg QD    #DMT2  - hold metformin  - A1C 7.6  - start ISS    #Elevated AP  - f/u AM GGT  - daily CMP for now     #Leukocytosis  - suspect reactive, improved from last admission  - daily CBC    #DVT: ASA/Plavix and SCD  #Diet: Pending LUIS A,MRI BRAIN and further cardiac rec  Dispo: pending clinical course  care of plan dw pt. lisa rn    addendum: episode of vomiting x1. c/o nausea. denies dizziness,abd /cp,diarrhea,weakness,numbness. Pt wants to eat. npo for LUIS A. HX OF COCAINE ABUSE -PT STATES she does occasionally,last used 5 days ago. denies any other  ilicit drugs/ivda.Drinks alcohol socially- mostly weekend.  ivf, antiemetic.  communicate via ED .lisa rn

## 2021-11-22 NOTE — PROGRESS NOTE ADULT - ASSESSMENT
52yo F w/pmh DM, HTN and recent observation stay for dizziness p/w persistent dizziness as well as left sided weakness and numbness. Per patient, she was in the ED for dizziness last week, which never really resolved by the time she was discharged. CTH and MRI during that admission was negative. Patient was started on statin and ASA and sent home for outpatient workup. She was scheduled to follow up with cardiology outpatient for PFO.  She reports worsening dizziness with associated chest pressure since last night with nausea and NBNB emesis, Chest pain 2/10,  midsternal radiating to under breast, relieved with rest. . Went to sleep and woke up this AM withe left sided weakness as well as left facial and numbness. Reports overall exhaustion this AM, with dizziness my head spinning and chest pain 5/10 non radiating, relieved with rest, + n/v as well, so I came to the ED for further evaluation.      Stroke/recurrent TIA  - Neurology following and recommended  - Repeat MRI pending, initial MRI with no CVA.   - PFO noted on TTE.   - NPO.   - CATY today to further evaluate PFO.   - ILR after CATY.   - Continue aspirin, plavix, and lipitor.       Chest Pain  - Resolved.   - Troponin negative x 2.   - Continue aspirin, plavix, and statin.   - Outpatient ischemic evaluation.   - Echo with normal EF, no RWMA.    Diabetes   - HgbA1c= 7.6% needs tight glucose control  - Continue insulin sliding scale.      Assessment and recommendations are final when note is signed by the attending.

## 2021-11-22 NOTE — OCCUPATIONAL THERAPY INITIAL EVALUATION ADULT - VISUAL ACUITY
Pt reports occasionally will see spots (bilaterally), not present during eval/no complaints in vision during eval. Pt able to correctly identify # of digits held in central and peripheral fields bilaterally

## 2021-11-22 NOTE — CONSULT NOTE ADULT - SUBJECTIVE AND OBJECTIVE BOX
Patient is a 51y old  Female who presents with a chief complaint of TIA (2021 10:59)    HPI:  52yo F w/pmh DM, HTN and recent observation stay for dizziness p/w persistent dizziness as well as left sided weakness and numbness. Per patient, she was in the ED for dizziness last week, which never really resolved by the time she was discharged. CTH and MRI during that admission was negative. Patient was started on statin and ASA and sent home for outpatient workup. She reports worsening dizziness with associated chest pressure since last night with nausea and NBNB emesis. Went to sleep and woke up this AM withe left sided weakness as well as left facial and numbness. Reports overall exhaustion this AM, and came to the ED for further evaluation.   ROS notable for dizziness (lightheadedness), substernal chest pressure, emesis, as well as diffuse headache.     ED vitals with /125, labs WNL. Noted with right facial droop s/p code stroke. Admitted for further management.   Patient personally denies ETOH/illicit drug use. However, daughter reports that her mother does use cocaine on the weekends and sometimes drinks on the weekend as well.  (2021 14:38)      HISTORY OF PRESENT ILLNESS:   51yF PMH     PAST MEDICAL & SURGICAL HISTORY:  HTN (hypertension)    HLD (hyperlipidemia)    Diabetes    H/O:       FAMILY HISTORY:  Family history of hypertension in mother  she is alive 83 yo    Family history of coronary artery disease (Father)  father  at age 64 from MI        SOCIAL HISTORY:  Tobacco Use:  EtOH use:   Substance:    Allergies    No Known Allergies    Intolerances        REVIEW OF SYSTEMS  Negative except as noted in HPI  CONSTITUTIONAL: No fever, weight loss, or fatigue  EYES: No eye pain, visual disturbances, or discharge  ENMT:  No difficulty hearing, tinnitus, vertigo; No sinus or throat pain  NECK: No pain or stiffness  BREASTS: No pain, masses, or nipple discharge  RESPIRATORY: No cough, wheezing, chills or hemoptysis; No shortness of breath  CARDIOVASCULAR: No chest pain, palpitations, dizziness, or leg swelling  GASTROINTESTINAL: No abdominal or epigastric pain. No nausea, vomiting, or hematemesis; No diarrhea or constipation. No melena or hematochezia.  GENITOURINARY: No dysuria, frequency, hematuria, or incontinence  NEUROLOGICAL: No headaches, memory loss, loss of strength, numbness, or tremors  SKIN: No itching, burning, rashes, or lesions   LYMPH NODES: No enlarged glands  ENDOCRINE: No heat or cold intolerance; No hair loss  MUSCULOSKELETAL: No joint pain or swelling; No muscle, back, or extremity pain  PSYCHIATRIC: No depression, anxiety, mood swings, or difficulty sleeping  HEME/LYMPH: No easy bruising, or bleeding gums  ALLERY AND IMMUNOLOGIC: No hives or eczema    HOME MEDICATIONS:  Home Medications:  Aspirin Enteric Coated 81 mg oral delayed release tablet: 1 tab(s) orally once a day (2021 14:38)  lisinopril 5 mg oral tablet: 1 tab(s) orally once a day (2021 14:38)  metFORMIN 500 mg oral tablet: 1 tab(s) orally 2 times a day (2021 14:38)  venlafaxine 75 mg oral capsule, extended release: 1 cap(s) orally once a day (2021 14:38)      MEDICATIONS:  Antibiotics:    Neuro:  venlafaxine XR. 75 milliGRAM(s) Oral daily    Anticoagulation:  aspirin  chewable 81 milliGRAM(s) Oral daily  clopidogrel Tablet 75 milliGRAM(s) Oral daily    OTHER:  atorvastatin 80 milliGRAM(s) Oral at bedtime  dextrose 40% Gel 15 Gram(s) Oral once  dextrose 50% Injectable 25 Gram(s) IV Push once  dextrose 50% Injectable 12.5 Gram(s) IV Push once  dextrose 50% Injectable 25 Gram(s) IV Push once  glucagon  Injectable 1 milliGRAM(s) IntraMuscular once  insulin lispro (ADMELOG) corrective regimen sliding scale   SubCutaneous every 6 hours  lisinopril 10 milliGRAM(s) Oral daily    IVF:  dextrose 5%. 1000 milliLiter(s) IV Continuous <Continuous>  dextrose 5%. 1000 milliLiter(s) IV Continuous <Continuous>      Vital Signs Last 24 Hrs  T(C): 36.6 (2021 11:16), Max: 36.8 (2021 18:15)  T(F): 97.8 (2021 11:16), Max: 98.2 (2021 18:15)  HR: 76 (2021 11:16) (76 - 91)  BP: 122/82 (2021 11:16) (113/72 - 172/90)  BP(mean): --  RR: 18 (2021 11:16) (18 - 18)  SpO2: 96% (2021 11:16) (96% - 100%)      PHYSICAL EXAM:  GENERAL: NAD, well-groomed, well-developed  HEAD:  Atraumatic, normocephalic  DRAINS:   WOUND: Dressing clean dry intact; well healed  SHUNT: easily compressible and refills  EYES: Conjunctiva and sclera clear; corneal reflex intact  ENMT: No tonsillar erythema, exudates, or enlargement; moist mucous membranes, good dentition, no lesions  NECK: Supple, no JVD, dormal thyroid  MARIANA COMA SCORE: E- V- M- =       E: 4= opens eyes spontaneously 3= to voice 2= to noxious 1= no opening       V: 5= oriented 4= confused 3= inappropriate words 2= incomprehensible sounds 1= nonverbal 1T= intubated       M: 6= follows commands 5= localizes 4= withdraws 3= flexor posturing 2= extensor posturing 1= no movement  MENTAL STATUS: AAO x3; Awake/Comatose; Opens eyes spontaneously/to voice/to light touch/to noxious stimuli; Appropriately conversant without aphasia/Nonverbal; following simple commands/mimicking/not following commands  CRANIAL NERVES: Visual acuity normal for age, visual fields full to confrontation, PERRL. EOMI without nystagmus. Facial sensation intact V1-3 distribution b/l. Face symmetric w/ normal eye closure and smile, tongue midline. Hearing grossly intact. Speech clear. Head turning and shoulder shrug intact.   REFLEXES: PERRL. Corneals intact b/l. Gag intact. Cough intact. Oculocephalic reflex intact (Doll's eye). Negative Cedillo's b/l. Negative clonus b/l  MOTOR: strength 5/5 b/l upper and lower extremities  Uppers     Delt (C5/6)     Bicep (C5/6)     Wrist Extend (C6)     Tricep (C7)     HG (C8/T1)  R                     5/5                 5/5                         5/5                           5/5                   5/5  L                      5/5                 5/5                         5/5                           5/5                   5/5  Lowers      HF(L1/L2)     KE (L3)     DF (L4)     EHL (L5)     PF (S1)      R                     5/5              5/5           5/5           5/5            5/5  L                     5/5               5/5          5/5            5/5            5/5  SENSATION: grossly intact to light touch all extremities  COORDINATION: Gait intact; rapid alternating movements intact b/l upper extremities; no upper extremity dysmetria  MUSCLE STRETCH REFLEXES: DTRs 2+ intact and symmetric  PLANTAR: upgoing/downgoing/mute (Babinski)  CHEST/LUNG: Clear to auscultation bilaterally; no rales, rhonchi, wheezing, or rubs  HEART: +S1/+S2; Regular rate and rhythm; no murmurs, rubs, or gallops  ABDOMEN: Soft, nontender, nondistended; bowel sounds present all four quadrants  EXTREMITIES:  2+ peripheral pulses, no clubbing, cyanosis, or edema  LYMPH: No lymphadenopathy noted  SKIN: Warm, dry; no rashes or lesions    LABS:                        12.9   9.38  )-----------( 277      ( 2021 03:32 )             41.2         137  |  99  |  21.9<H>  ----------------------------<  155<H>  3.9   |  26.0  |  0.60    Ca    9.0      2021 03:32  Phos  4.1       Mg     2.3         TPro  7.2  /  Alb  3.8  /  TBili  0.3<L>  /  DBili  x   /  AST  25  /  ALT  30  /  AlkPhos  140<H>      PT/INR - ( 2021 12:57 )   PT: 12.3 sec;   INR: 1.06 ratio         PTT - ( 2021 12:57 )  PTT:30.8 sec  Urinalysis Basic - ( 2021 07:22 )    Color: Yellow / Appearance: Clear / S.025 / pH: x  Gluc: x / Ketone: Trace  / Bili: Negative / Urobili: Negative mg/dL   Blood: x / Protein: 30 mg/dL / Nitrite: Negative   Leuk Esterase: Negative / RBC: 3-5 /HPF / WBC Negative   Sq Epi: x / Non Sq Epi: Many / Bacteria: Few        CULTURES:      RADIOLOGY & ADDITIONAL STUDIES:      CAPRINI SCORE [CLOT]:  Patient has an estimated Caprini score of greater than 5.  However, the patient's unique clinical situation will be addressed in an individual manner to determine appropriate anticoagulation treatment, if any. Patient is a 51y old  Female who presents with a chief complaint of TIA (2021 10:59)    HPI:  52yo F w/pmh DM, HTN and recent observation stay for dizziness p/w persistent dizziness as well as left sided weakness and numbness. Per patient, she was in the ED for dizziness last week, which never really resolved by the time she was discharged. CTH and MRI during that admission was negative. Patient was started on statin and ASA and sent home for outpatient workup. She reports worsening dizziness with associated chest pressure since last night with nausea and NBNB emesis. Went to sleep and woke up this AM withe left sided weakness as well as left facial and numbness. Reports overall exhaustion this AM, and came to the ED for further evaluation.   ROS notable for dizziness (lightheadedness), substernal chest pressure, emesis, as well as diffuse headache.     Neurosurgery called for an incidental finding of a L PCOMM aneurysm vs infundibulum and a L MCA aneurysm. Patient currently complaining of headache and LLE numbness.     PAST MEDICAL & SURGICAL HISTORY:  HTN (hypertension)    HLD (hyperlipidemia)    Diabetes    PFO    Sleep apnea     H/O:       FAMILY HISTORY:  Patient believes that her grandfather had a cerebral aneurysm         SOCIAL HISTORY:  Tobacco Use: Denies.  EtOH use: Denies.  Substance: Denies.    Allergies:  No Known Allergies    Vital Signs Last 24 Hrs  T(C): 36.6 (2021 11:16), Max: 36.8 (2021 18:15)  T(F): 97.8 (2021 11:16), Max: 98.2 (2021 18:15)  HR: 76 (2021 11:16) (76 - 91)  BP: 122/82 (2021 11:16) (113/72 - 172/90)  BP(mean): --  RR: 18 (2021 11:16) (18 - 18)  SpO2: 96% (2021 11:16) (96% - 100%)      PHYSICAL EXAM:  GENERAL: NAD, well-groomed  HEAD:  Atraumatic, normocephalic  EYES: Conjunctiva and sclera clear; corneal reflex intact  ENMT: No tonsillar erythema, exudates, or enlargement; moist mucous membranes, good dentition, no lesions  NECK: Supple, no JVD, normal thyroid  MARIANA COMA SCORE: E-4 V-5 M-6 = 15       E: 4= opens eyes spontaneously 3= to voice 2= to noxious 1= no opening       V: 5= oriented 4= confused 3= inappropriate words 2= incomprehensible sounds 1= nonverbal 1T= intubated       M: 6= follows commands 5= localizes 4= withdraws 3= flexor posturing 2= extensor posturing 1= no movement  MENTAL STATUS: AAO x3; Awake; Opens eyes spontaneously; Appropriately conversant without aphasia; following simple commands  CRANIAL NERVES: PERRL. EOMI without nystagmus. Facial sensation intact V1-3 distribution b/l. Mild R facial droop. Hearing grossly intact. Speech clear.  MOTOR: strength 5/5 b/l upper and lower extremities  SENSATION: subjective LLE numbness, sensation intact to light touch throughout  EXTREMITIES:  2+ peripheral pulses, no clubbing, cyanosis, or edema  SKIN: Warm, dry; no rashes or lesions    LABS:                        12.9   9.38  )-----------( 277      ( 2021 03:32 )             41.2         137  |  99  |  21.9<H>  ----------------------------<  155<H>  3.9   |  26.0  |  0.60    Ca    9.0      2021 03:32  Phos  4.1       Mg     2.3         TPro  7.2  /  Alb  3.8  /  TBili  0.3<L>  /  DBili  x   /  AST  25  /  ALT  30  /  AlkPhos  140<H>      PT/INR - ( 2021 12:57 )   PT: 12.3 sec;   INR: 1.06 ratio         PTT - ( 2021 12:57 )  PTT:30.8 sec  Urinalysis Basic - ( 2021 07:22 )    Color: Yellow / Appearance: Clear / S.025 / pH: x  Gluc: x / Ketone: Trace  / Bili: Negative / Urobili: Negative mg/dL   Blood: x / Protein: 30 mg/dL / Nitrite: Negative   Leuk Esterase: Negative / RBC: 3-5 /HPF / WBC Negative   Sq Epi: x / Non Sq Epi: Many / Bacteria: Few        RADIOLOGY & ADDITIONAL STUDIES:  < from: CT Angio Neck w/ IV Cont (21 @ 13:11) >  IMPRESSION:  HEAD CT: No acute intracranial hemorrhage or acute territorial infarction.    CT PERFUSION demonstrated: No asymmetric or territorial perfusion abnormality.    If symptoms persist consider follow up head CT or MRI, MRA  if no contraindication.    CTA COW:  Patent intracranial circulation without flow limiting stenosis or large vessel occlusion. There is a 1.4 mm left P-comm aneurysm versus infundibulum. 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    CTA NECK: Patent, ECAs, ICAs, no  hemodynamically significant stenosis at  ICA origins by NASCET criteria.  Bilateral vertebral arteries are patent without flow limiting stenosis.    --- End of Report ---      CRISTINA BARNES MD; Attending Radiologist  This document has been electronically signed. 2021  2:15PM    < end of copied text >

## 2021-11-22 NOTE — OCCUPATIONAL THERAPY INITIAL EVALUATION ADULT - PERTINENT HX OF CURRENT PROBLEM, REHAB EVAL
PMH DM, HTN and recent observation stay for dizziness p/w persistent dizziness as well as left sided weakness and numbness. Patient was discharged home and returned to the ER yesterday. Neurosurgery called for an incidental finding of a L PCOMM aneurysm vs infundibulum and a L MCA aneurysm. Patient currently complaining of headache and LLE numbness.

## 2021-11-22 NOTE — OCCUPATIONAL THERAPY INITIAL EVALUATION ADULT - MD/RN NOTIFIED
PLAN:   RX: Prenatal Vitamin + DHA given.  Early OB counseling completed.  Patient [place] [facility] [place] [facility] [place].    Counseled on age appropriate 1st trimester Genetic screening.  Literature given for home review.  Patient to check her insurance for coverage.    US ordered to confirm fetal viability--will check with staff to see if she needs the one in 2 weeks, as RE has confirmed viability and dates..   Patient should schedule IOB on the same day as US appt if possible.  Serum STD panel ordered-HIV, RPR, Hep B;  GC/Chlamydia collected.    Thin Prep Pap smear collected.    Amenorrhea Lab Panel ordered.    Return to clinic as directed for US and IOB or prn.    Patient repeated all of the instructions and states she understands the plan of care.    Ese Man, CNP         yes

## 2021-11-23 LAB
ANION GAP SERPL CALC-SCNC: 11 MMOL/L — SIGNIFICANT CHANGE UP (ref 5–17)
ANION GAP SERPL CALC-SCNC: 13 MMOL/L — SIGNIFICANT CHANGE UP (ref 5–17)
BUN SERPL-MCNC: 23.6 MG/DL — HIGH (ref 8–20)
BUN SERPL-MCNC: 24.5 MG/DL — HIGH (ref 8–20)
CALCIUM SERPL-MCNC: 8.2 MG/DL — LOW (ref 8.6–10.2)
CALCIUM SERPL-MCNC: 8.5 MG/DL — LOW (ref 8.6–10.2)
CHLORIDE SERPL-SCNC: 102 MMOL/L — SIGNIFICANT CHANGE UP (ref 98–107)
CHLORIDE SERPL-SCNC: 103 MMOL/L — SIGNIFICANT CHANGE UP (ref 98–107)
CO2 SERPL-SCNC: 24 MMOL/L — SIGNIFICANT CHANGE UP (ref 22–29)
CO2 SERPL-SCNC: 26 MMOL/L — SIGNIFICANT CHANGE UP (ref 22–29)
CREAT SERPL-MCNC: 0.65 MG/DL — SIGNIFICANT CHANGE UP (ref 0.5–1.3)
CREAT SERPL-MCNC: 0.95 MG/DL — SIGNIFICANT CHANGE UP (ref 0.5–1.3)
GLUCOSE BLDC GLUCOMTR-MCNC: 101 MG/DL — HIGH (ref 70–99)
GLUCOSE BLDC GLUCOMTR-MCNC: 127 MG/DL — HIGH (ref 70–99)
GLUCOSE BLDC GLUCOMTR-MCNC: 131 MG/DL — HIGH (ref 70–99)
GLUCOSE BLDC GLUCOMTR-MCNC: 142 MG/DL — HIGH (ref 70–99)
GLUCOSE BLDC GLUCOMTR-MCNC: 158 MG/DL — HIGH (ref 70–99)
GLUCOSE BLDC GLUCOMTR-MCNC: 159 MG/DL — HIGH (ref 70–99)
GLUCOSE BLDC GLUCOMTR-MCNC: 167 MG/DL — HIGH (ref 70–99)
GLUCOSE BLDC GLUCOMTR-MCNC: 181 MG/DL — HIGH (ref 70–99)
GLUCOSE SERPL-MCNC: 173 MG/DL — HIGH (ref 70–99)
GLUCOSE SERPL-MCNC: 177 MG/DL — HIGH (ref 70–99)
MAGNESIUM SERPL-MCNC: 2.3 MG/DL — SIGNIFICANT CHANGE UP (ref 1.6–2.6)
PHOSPHATE SERPL-MCNC: 3 MG/DL — SIGNIFICANT CHANGE UP (ref 2.4–4.7)
POTASSIUM SERPL-MCNC: 3.5 MMOL/L — SIGNIFICANT CHANGE UP (ref 3.5–5.3)
POTASSIUM SERPL-MCNC: 4.4 MMOL/L — SIGNIFICANT CHANGE UP (ref 3.5–5.3)
POTASSIUM SERPL-SCNC: 3.5 MMOL/L — SIGNIFICANT CHANGE UP (ref 3.5–5.3)
POTASSIUM SERPL-SCNC: 4.4 MMOL/L — SIGNIFICANT CHANGE UP (ref 3.5–5.3)
SODIUM SERPL-SCNC: 139 MMOL/L — SIGNIFICANT CHANGE UP (ref 135–145)
SODIUM SERPL-SCNC: 139 MMOL/L — SIGNIFICANT CHANGE UP (ref 135–145)

## 2021-11-23 PROCEDURE — 76376 3D RENDER W/INTRP POSTPROCES: CPT | Mod: 26

## 2021-11-23 PROCEDURE — 99232 SBSQ HOSP IP/OBS MODERATE 35: CPT

## 2021-11-23 PROCEDURE — 93312 ECHO TRANSESOPHAGEAL: CPT | Mod: 26

## 2021-11-23 PROCEDURE — 93325 DOPPLER ECHO COLOR FLOW MAPG: CPT | Mod: 26

## 2021-11-23 PROCEDURE — 93320 DOPPLER ECHO COMPLETE: CPT | Mod: 26

## 2021-11-23 RX ORDER — POTASSIUM CHLORIDE 20 MEQ
20 PACKET (EA) ORAL ONCE
Refills: 0 | Status: COMPLETED | OUTPATIENT
Start: 2021-11-23 | End: 2021-11-23

## 2021-11-23 RX ADMIN — Medication 75 MILLIGRAM(S): at 12:54

## 2021-11-23 RX ADMIN — HEPARIN SODIUM 5000 UNIT(S): 5000 INJECTION INTRAVENOUS; SUBCUTANEOUS at 05:53

## 2021-11-23 RX ADMIN — Medication 1: at 23:10

## 2021-11-23 RX ADMIN — Medication 81 MILLIGRAM(S): at 12:54

## 2021-11-23 RX ADMIN — CLOPIDOGREL BISULFATE 75 MILLIGRAM(S): 75 TABLET, FILM COATED ORAL at 12:54

## 2021-11-23 RX ADMIN — Medication 1: at 05:56

## 2021-11-23 RX ADMIN — ATORVASTATIN CALCIUM 80 MILLIGRAM(S): 80 TABLET, FILM COATED ORAL at 21:31

## 2021-11-23 RX ADMIN — Medication 20 MILLIEQUIVALENT(S): at 23:20

## 2021-11-23 RX ADMIN — HEPARIN SODIUM 5000 UNIT(S): 5000 INJECTION INTRAVENOUS; SUBCUTANEOUS at 18:47

## 2021-11-23 RX ADMIN — LISINOPRIL 10 MILLIGRAM(S): 2.5 TABLET ORAL at 05:53

## 2021-11-23 NOTE — PROGRESS NOTE ADULT - ASSESSMENT
50yo F w/pmh DMT2, HTN and recent observation stay for dizziness p/w persistent dizziness as well as left sided weakness and numbness, admitted for TIA workup.     #TIA   - w/ associated dizziness, reported right sided facial droop and left sided facial numbness and Left extremities weakness  - CTH/neck and angio:   There is a 1.4 mm left P-comm aneurysm versus infundibulum. 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 No acute infacrt. no intervention per neurosurgery. f/u out pt.  - q4h neurocheck  - c/w ASA and Plavix and Statin  - monitor on tele  - TTE 11/19: moderately sized patent foramen ovale, with predominantly right to left shunting across the atrial septum and DVT study negative   - MRI head pending   -  LUIS A with possible PFO closure.  plan for luis a today  - f/u blood ETOH and Utox level  -c/s neurosurgery  -neurology is following  -mri brain    #Moderate PFO:  - cards consulted for LUIS A, salome recs. plan for LUIS A today    #Brain aneurysm - noted on CTH as above  - c/s neurosurgery    #HTN   - on Lisinopril 5mg at home  - BP currently poorly controlled with SBP > 170  -  Lisinopril to 10mg QD    #DMT2  - hold metformin  - A1C 7.6  - start ISS    #Elevated AP  - f/u AM GGT  - daily CMP for now     #Leukocytosis  - suspect reactive, improved from last admission  - daily CBC    #DVT: ASA/Plavix and SCD  #Diet: Pending LUIS A,MRI BRAIN and further cardiac rec  Dispo: pending clinical course  care of plan lisa pt. lisa rn  # Nausea,vomiting-Resolved   # HX OF COCAINE ABUSE -PT STATES she does occasionally,last used 5 days ago. denies any other  ilicit drugs/ivda.Drinks alcohol socially- mostly weekend.  ivf, antiemetic.  care of plan lisa pt  lisa rn

## 2021-11-23 NOTE — CHART NOTE - NSCHARTNOTEFT_GEN_A_CORE
Patient with 12 beats of Bigeminy w/ associated PVCs - patient now back to baseline rhythm  Patient is asymptomatic, VSS   STAT Mag, Phos, and BMP pending   RN to notify with any acute changes Patient with 12 beats of Bigeminy w/ associated PVCs - patient now back to baseline rhythm  Patient is asymptomatic, VSS   STAT Mag, Phos, and BMP pending     Addendum 23:00   Potassium 3.5 -> will supplement 20 meq PO x1   All other labs WNL    RN to notify with any acute changes Patient with 12 beats of Bigeminy w/ associated PVCs - patient now back to baseline rhythm  Patient is asymptomatic, VSS   STAT Mag, Phos, and BMP pending     Addendum 23:00   Potassium 3.5 -> will supplement  All other labs WNL    RN to notify with any acute changes

## 2021-11-23 NOTE — PROGRESS NOTE ADULT - ASSESSMENT
50yo F w/pmh DM, HTN and recent observation stay for dizziness p/w persistent dizziness as well as left sided weakness and numbness. Per patient, she was in the ED for dizziness last week, which never really resolved by the time she was discharged. CTH and MRI during that admission was negative. Patient was started on statin and ASA and sent home for outpatient workup. She was scheduled to follow up with cardiology outpatient for PFO.  She reports worsening dizziness with associated chest pressure since last night with nausea and NBNB emesis, Chest pain 2/10,  midsternal radiating to under breast, relieved with rest. . Went to sleep and woke up this AM withe left sided weakness as well as left facial and numbness. Reports overall exhaustion this AM, with dizziness my head spinning and chest pain 5/10 non radiating, relieved with rest, + n/v as well, so I came to the ED for further evaluation.      Stroke/recurrent TIA  - Neurology following and recommended  - Repeat MRI pending, initial MRI with no CVA.   - PFO noted on TTE.   - NPO.   - CATY today to further evaluate PFO.   - ILR after CATY.   - Continue aspirin, plavix, and lipitor.   - Discussed with Dr Blanchard for evaluation for PFO closure, pt has multiple risk factors that could be contributing to symptomology which need to be ruled out, will pursue aggressive risk factor modification strategy and monitor with loop recorder.     Chest Pain  - Resolved.   - Troponin negative x 2.   - Continue aspirin, plavix, and statin.   - Outpatient ischemic evaluation.   - Echo with normal EF, no RWMA.    Diabetes   - HgbA1c= 7.6% needs tight glucose control  - Continue insulin sliding scale.

## 2021-11-24 ENCOUNTER — TRANSCRIPTION ENCOUNTER (OUTPATIENT)
Age: 51
End: 2021-11-24

## 2021-11-24 VITALS
TEMPERATURE: 98 F | OXYGEN SATURATION: 97 % | DIASTOLIC BLOOD PRESSURE: 79 MMHG | HEART RATE: 73 BPM | RESPIRATION RATE: 18 BRPM | SYSTOLIC BLOOD PRESSURE: 142 MMHG

## 2021-11-24 LAB
AMPHET UR-MCNC: NEGATIVE — SIGNIFICANT CHANGE UP
ANA PAT FLD IF-IMP: ABNORMAL
ANA TITR SER: ABNORMAL
ANION GAP SERPL CALC-SCNC: 11 MMOL/L — SIGNIFICANT CHANGE UP (ref 5–17)
BARBITURATES UR SCN-MCNC: NEGATIVE — SIGNIFICANT CHANGE UP
BENZODIAZ UR-MCNC: NEGATIVE — SIGNIFICANT CHANGE UP
BUN SERPL-MCNC: 21.1 MG/DL — HIGH (ref 8–20)
CALCIUM SERPL-MCNC: 8.6 MG/DL — SIGNIFICANT CHANGE UP (ref 8.6–10.2)
CHLORIDE SERPL-SCNC: 104 MMOL/L — SIGNIFICANT CHANGE UP (ref 98–107)
CO2 SERPL-SCNC: 25 MMOL/L — SIGNIFICANT CHANGE UP (ref 22–29)
COCAINE METAB.OTHER UR-MCNC: NEGATIVE — SIGNIFICANT CHANGE UP
CREAT SERPL-MCNC: 0.59 MG/DL — SIGNIFICANT CHANGE UP (ref 0.5–1.3)
GLUCOSE BLDC GLUCOMTR-MCNC: 136 MG/DL — HIGH (ref 70–99)
GLUCOSE BLDC GLUCOMTR-MCNC: 145 MG/DL — HIGH (ref 70–99)
GLUCOSE BLDC GLUCOMTR-MCNC: 156 MG/DL — HIGH (ref 70–99)
GLUCOSE SERPL-MCNC: 164 MG/DL — HIGH (ref 70–99)
HCG UR QL: NEGATIVE — SIGNIFICANT CHANGE UP
HCT VFR BLD CALC: 39.4 % — SIGNIFICANT CHANGE UP (ref 34.5–45)
HGB BLD-MCNC: 11.9 G/DL — SIGNIFICANT CHANGE UP (ref 11.5–15.5)
MCHC RBC-ENTMCNC: 28 PG — SIGNIFICANT CHANGE UP (ref 27–34)
MCHC RBC-ENTMCNC: 30.2 GM/DL — LOW (ref 32–36)
MCV RBC AUTO: 92.7 FL — SIGNIFICANT CHANGE UP (ref 80–100)
METHADONE UR-MCNC: NEGATIVE — SIGNIFICANT CHANGE UP
OPIATES UR-MCNC: NEGATIVE — SIGNIFICANT CHANGE UP
PCP SPEC-MCNC: SIGNIFICANT CHANGE UP
PCP UR-MCNC: NEGATIVE — SIGNIFICANT CHANGE UP
PLATELET # BLD AUTO: 272 K/UL — SIGNIFICANT CHANGE UP (ref 150–400)
POTASSIUM SERPL-MCNC: 3.9 MMOL/L — SIGNIFICANT CHANGE UP (ref 3.5–5.3)
POTASSIUM SERPL-SCNC: 3.9 MMOL/L — SIGNIFICANT CHANGE UP (ref 3.5–5.3)
RBC # BLD: 4.25 M/UL — SIGNIFICANT CHANGE UP (ref 3.8–5.2)
RBC # FLD: 12.6 % — SIGNIFICANT CHANGE UP (ref 10.3–14.5)
SODIUM SERPL-SCNC: 140 MMOL/L — SIGNIFICANT CHANGE UP (ref 135–145)
THC UR QL: NEGATIVE — SIGNIFICANT CHANGE UP
WBC # BLD: 8.05 K/UL — SIGNIFICANT CHANGE UP (ref 3.8–10.5)
WBC # FLD AUTO: 8.05 K/UL — SIGNIFICANT CHANGE UP (ref 3.8–10.5)

## 2021-11-24 PROCEDURE — 99232 SBSQ HOSP IP/OBS MODERATE 35: CPT

## 2021-11-24 PROCEDURE — 70551 MRI BRAIN STEM W/O DYE: CPT | Mod: 26

## 2021-11-24 PROCEDURE — 33285 INSJ SUBQ CAR RHYTHM MNTR: CPT

## 2021-11-24 PROCEDURE — 99239 HOSP IP/OBS DSCHRG MGMT >30: CPT

## 2021-11-24 RX ORDER — VENLAFAXINE HCL 75 MG
1 CAPSULE, EXT RELEASE 24 HR ORAL
Qty: 0 | Refills: 0 | DISCHARGE
Start: 2021-11-24

## 2021-11-24 RX ORDER — VENLAFAXINE HCL 75 MG
1 CAPSULE, EXT RELEASE 24 HR ORAL
Qty: 0 | Refills: 0 | DISCHARGE

## 2021-11-24 RX ORDER — CEFAZOLIN SODIUM 1 G
1000 VIAL (EA) INJECTION ONCE
Refills: 0 | Status: DISCONTINUED | OUTPATIENT
Start: 2021-11-24 | End: 2021-11-24

## 2021-11-24 RX ORDER — CEFAZOLIN SODIUM 1 G
2000 VIAL (EA) INJECTION ONCE
Refills: 0 | Status: COMPLETED | OUTPATIENT
Start: 2021-11-24 | End: 2021-11-24

## 2021-11-24 RX ORDER — CEPHALEXIN 500 MG
500 CAPSULE ORAL ONCE
Refills: 0 | Status: DISCONTINUED | OUTPATIENT
Start: 2021-11-24 | End: 2021-11-24

## 2021-11-24 RX ORDER — CLOPIDOGREL BISULFATE 75 MG/1
1 TABLET, FILM COATED ORAL
Qty: 30 | Refills: 0
Start: 2021-11-24 | End: 2021-12-23

## 2021-11-24 RX ADMIN — Medication 75 MILLIGRAM(S): at 10:50

## 2021-11-24 RX ADMIN — HEPARIN SODIUM 5000 UNIT(S): 5000 INJECTION INTRAVENOUS; SUBCUTANEOUS at 16:53

## 2021-11-24 RX ADMIN — CLOPIDOGREL BISULFATE 75 MILLIGRAM(S): 75 TABLET, FILM COATED ORAL at 10:50

## 2021-11-24 RX ADMIN — Medication 650 MILLIGRAM(S): at 07:59

## 2021-11-24 RX ADMIN — HEPARIN SODIUM 5000 UNIT(S): 5000 INJECTION INTRAVENOUS; SUBCUTANEOUS at 05:16

## 2021-11-24 RX ADMIN — Medication 100 MILLIGRAM(S): at 19:04

## 2021-11-24 RX ADMIN — LISINOPRIL 10 MILLIGRAM(S): 2.5 TABLET ORAL at 05:17

## 2021-11-24 RX ADMIN — Medication 650 MILLIGRAM(S): at 08:15

## 2021-11-24 RX ADMIN — Medication 81 MILLIGRAM(S): at 10:50

## 2021-11-24 RX ADMIN — Medication 1: at 05:26

## 2021-11-24 NOTE — DISCHARGE NOTE PROVIDER - HOSPITAL COURSE
52yo F w/pmh DMT2, HTN and recent observation stay for dizziness p/w persistent dizziness with right sided facial droop and left sided facial numbness and Left extremities weakness admitted for TIA workup.Pt was at ed 11/19 mri brain was stable. dc from ed and back to ed again for above issues.   CTH/neck and angio: here is a 1.4 mm left P-comm aneurysm versus infundibulum. 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 No acute infacrt. no intervention per neurosurgery. f/u out pt.Repeat mri brain no cva. Neurology Dr Child recommends to continue plavix,asa,statin.Symptoms resolved. Pt is ambulating w/o assist.   TTE 11/19: moderately sized patent foramen ovale, with predominantly right to left shunting across the atrial septum and DVT study negative .S/P CATY 11/23/21 which showed a moderate PFO with bidirectional shunt.no plan for intervention, cardiology rec to continue asa,plavix,statin. f/u out pt. ILR PLACEMENT  -neurology is following      #Brain aneurysm - noted on CTH as above  - F/U OUTPT  neurosurgery    #HTN   - on Lisinopril 5mg at home  - BP currently poorly controlled with SBP > 170  -  Lisinopril to 10mg QD    #DMT2  - hold metformin,RESUME ON DISCHARGE  - A1C 7.6  - start ISS        # Nausea,vomiting-Resolved   # HX OF COCAINE ABUSE -PT STATES she does occasionally, last used 5 days ago. denies any other  ilicit drugs/ivda.Drinks alcohol socially- mostly weekend.   TO REFRAIN FROM [employer]LL[employer] DRUGS.  care of plan dw pt  Discharge home.  time spent> 35 minus

## 2021-11-24 NOTE — DISCHARGE NOTE PROVIDER - PROVIDER TOKENS
PROVIDER:[TOKEN:[[id number]],FOLLOWUP:[1 week]],PROVIDER:[TOKEN:[[id number]],FOLLOWUP:[1 week]],PROVIDER:[TOKEN:[[id number]],FOLLOWUP:[1 month]] PROVIDER:[TOKEN:[[id number]],FOLLOWUP:[1 week]],PROVIDER:[TOKEN:[[id number]],FOLLOWUP:[1 week]],PROVIDER:[TOKEN:[[id number]],FOLLOWUP:[1 month]],PROVIDER:[TOKEN:[[id number]]]

## 2021-11-24 NOTE — SWALLOW BEDSIDE ASSESSMENT ADULT - SLP PERTINENT HISTORY OF CURRENT PROBLEM
As per MD note, "52yo F w/pmh DMT2, HTN and recent observation stay for dizziness p/w persistent dizziness as well as left sided weakness and numbness, admitted for TIA workup. CTH/neck and angio: There is a 1.4 mm left P-comm aneurysm versus infundibulum. 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 No acute infacrt. no intervention per neurosurgery. f/u out pt.. moderately sized patent foramen ovale, with predominantly right to left shunting across the atrial septum and DVT study negative".

## 2021-11-24 NOTE — DISCHARGE NOTE PROVIDER - NSDCMRMEDTOKEN_GEN_ALL_CORE_FT
Aspirin Enteric Coated 81 mg oral delayed release tablet: 1 tab(s) orally once a day  clopidogrel 75 mg oral tablet: 1 tab(s) orally once a day  Lipitor 80 mg oral tablet: 1 tab(s) orally once a day (at bedtime)   lisinopril 5 mg oral tablet: 2 tab(s) orally once a day  metFORMIN 500 mg oral tablet: 1 tab(s) orally 2 times a day  venlafaxine 75 mg oral capsule, extended release: 1 cap(s) orally once a day

## 2021-11-24 NOTE — PROGRESS NOTE ADULT - ASSESSMENT
50yo F w/pmh DMT2, HTN and recent observation stay for dizziness p/w persistent dizziness as well as left sided weakness and numbness, admitted for TIA workup.     #TIA   - w/ associated dizziness, reported right sided facial droop and left sided facial numbness and Left extremities weakness  - CTH/neck and angio:   There is a 1.4 mm left P-comm aneurysm versus infundibulum. 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 No acute infacrt. no intervention per neurosurgery. f/u out pt.  - q4h neurocheck  - c/w ASA and Plavix and Statin  - monitor on tele  - TTE 11/19: moderately sized patent foramen ovale, with predominantly right to left shunting across the atrial septum and DVT study negative   - MRI head pending . mri brain 11/19 on ed visit was stable  -S/P CATY which showed a moderate PFO with bidirectional shunt.no plan for inetervention  -neurology is following      #Moderate PFO:  -S/P CATY which showed a moderate PFO with bidirectional shunt.  - plan for ILR ,  Continue Plavix 75 mg daily and aspirin 81 mg daily.  Continue Lipitor 80 mg daily.    #Brain aneurysm - noted on CTH as above  - F/U OUTPT  neurosurgery    #HTN   - on Lisinopril 5mg at home  - BP currently poorly controlled with SBP > 170  -  Lisinopril to 10mg QD    #DMT2  - hold metformin  - A1C 7.6  - start ISS    #Elevated AP  - f/u AM GGT  - daily CMP for now     #Leukocytosis  - suspect reactive, improved from last admission  - daily CBC    # Nausea,vomiting-Resolved   # HX OF COCAINE ABUSE -PT STATES she does occasionally,last used 5 days ago. denies any other  ilicit drugs/ivda.Drinks alcohol socially- mostly weekend.  ivf, antiemetic.  care of plan dw pt  lisa rn

## 2021-11-24 NOTE — SWALLOW BEDSIDE ASSESSMENT ADULT - SWALLOW EVAL: DIAGNOSIS
Pt presents w/functional oropharyngeal swallow, no overt s/s penetration or aspiration noted across consistencies at the bedside.

## 2021-11-24 NOTE — DISCHARGE NOTE PROVIDER - NSDCCPTREATMENT_GEN_ALL_CORE_FT
PRINCIPAL PROCEDURE  Procedure: Insertion, loop recorder  Findings and Treatment: Loop Recorder Incision Care:     - Do not touch the incision until it is completely healed.   - There are steri-strips on your incision, which will start to flake off on its own over the next 2-3 weeks. Do not pick at or peel off.   - Do not apply soaps, creams, lotions, ointments or powders to the incision until it is completely healed.  - You should call the doctor if you notice redness, drainage, swelling, increased tenderness, hot sensation around the  incision, bleeding or incision edges pulling apart.

## 2021-11-24 NOTE — PROGRESS NOTE ADULT - ASSESSMENT
52yo F w/pmh DM, HTN and recent observation stay for dizziness p/w persistent dizziness as well as left sided weakness and numbness. Per patient, she was in the ED for dizziness last week, which never really resolved by the time she was discharged. CTH and MRI during that admission was negative. Patient was started on statin and ASA and sent home for outpatient workup. She was scheduled to follow up with cardiology outpatient for PFO.  She reports worsening dizziness with associated chest pressure since last night with nausea and NBNB emesis, Chest pain 2/10,  midsternal radiating to under breast, relieved with rest. . Went to sleep and woke up this AM withe left sided weakness as well as left facial and numbness. Reports overall exhaustion this AM, with dizziness my head spinning and chest pain 5/10 non radiating, relieved with rest, + n/v as well, so I came to the ED for further evaluation.      Stroke/recurrent TIA  - Neurology following and recommended  - Repeat MRI pending, initial MRI with no CVA.   - s/p CATY showing moderate size PFO with bidirectional shunt  - ILR implant pending   - Continue aspirin, plavix, and lipitor.   - Discussed with Dr Blanchard for evaluation for PFO closure, pt has multiple risk factors that could be contributing to symptomology which need to be ruled out, will pursue aggressive risk factor modification strategy and monitor with loop recorder.     Chest Pain  - Resolved.   - Troponin negative x 2.   - Continue aspirin, plavix, and statin.   - Outpatient ischemic evaluation.   - Echo with normal EF, no RWMA.    Diabetes   - HgbA1c= 7.6% needs tight glucose control  - Continue insulin sliding scale.

## 2021-11-24 NOTE — DISCHARGE NOTE PROVIDER - CARE PROVIDERS DIRECT ADDRESSES
,[email or web address]

## 2021-11-24 NOTE — DISCHARGE NOTE PROVIDER - NSDCCPCAREPLAN_GEN_ALL_CORE_FT
PRINCIPAL DISCHARGE DIAGNOSIS  Diagnosis: TIA (transient ischemic attack)  Assessment and Plan of Treatment:       SECONDARY DISCHARGE DIAGNOSES  Diagnosis: Brain aneurysm  Assessment and Plan of Treatment:     Diagnosis: PFO (patent foramen ovale)  Assessment and Plan of Treatment:     Diagnosis: HTN (hypertension)  Assessment and Plan of Treatment:     Diagnosis: DM (diabetes mellitus)  Assessment and Plan of Treatment:

## 2021-11-24 NOTE — PROGRESS NOTE ADULT - SUBJECTIVE AND OBJECTIVE BOX
Procedure: Loop Recorder Implant  Electrophysiologist: Daniel Nielson MD    Pt doing well s/p MDT loop recorder implant. Denies complaint.     Incision: Steri-strips C/D/I; no bleeding, hematoma, erythema, exudate or edema    Plan:   Ambulate w/ assist, then progress as tolerated.     Pain control with PO analgesia PRN.   Resume home medications.   Discharge planning per primary team with outpt f/up in 1-2 weeks.   
                                                                        Department of Cardiology                                                                  [facility] [address] [address]                                                            Telephone: [phone]. Fax:[phone]                                                                                         CATY NOTE     51y Female S/P CATY which showed a moderate PFO with bidirectional shunt.    PAST MEDICAL & SURGICAL HISTORY:  HTN (hypertension)  HLD (hyperlipidemia)  Diabetes  H/O:     MEDICATIONS  (STANDING):  aspirin  chewable 81 milliGRAM(s) Oral daily  atorvastatin 80 milliGRAM(s) Oral at bedtime  clopidogrel Tablet 75 milliGRAM(s) Oral daily  dextrose 40% Gel 15 Gram(s) Oral once  dextrose 5%. 1000 milliLiter(s) (50 mL/Hr) IV Continuous <Continuous>  dextrose 5%. 1000 milliLiter(s) (100 mL/Hr) IV Continuous <Continuous>  dextrose 50% Injectable 25 Gram(s) IV Push once  dextrose 50% Injectable 12.5 Gram(s) IV Push once  dextrose 50% Injectable 25 Gram(s) IV Push once  glucagon  Injectable 1 milliGRAM(s) IntraMuscular once  heparin   Injectable 5000 Unit(s) SubCutaneous every 12 hours  insulin lispro (ADMELOG) corrective regimen sliding scale   SubCutaneous every 6 hours  lisinopril 10 milliGRAM(s) Oral daily  sodium chloride 0.9%. 1000 milliLiter(s) (60 mL/Hr) IV Continuous <Continuous>  venlafaxine XR. 75 milliGRAM(s) Oral daily    MEDICATIONS  (PRN):  acetaminophen     Tablet .. 650 milliGRAM(s) Oral every 6 hours PRN Mild Pain (1 - 3)  ondansetron Injectable 4 milliGRAM(s) IV Push every 6 hours PRN Nausea and/or Vomiting    Home Medications:  Aspirin Enteric Coated 81 mg oral delayed release tablet: 1 tab(s) orally once a day (2021 14:38)  lisinopril 5 mg oral tablet: 1 tab(s) orally once a day (2021 14:38)  metFORMIN 500 mg oral tablet: 1 tab(s) orally 2 times a day (2021 14:38)  venlafaxine 75 mg oral capsule, extended release: 1 cap(s) orally once a day (2021 14:38)    Objective:   T(C): 36.4 (21 @ 16:23), Max: 36.7 (21 @ 19:50)  HR: 78 (21 @ 16:23) (70 - 92)  BP: 146/90 (21 @ 16:23) (121/83 - 148/81)  RR: 18 (21 @ 16:23) (18 - 19)  SpO2: 97% (21 @ 16:23) (96% - 99%)    CM: SR  General: Awake, alert, speech clear, no acute distress  Chest: S1, S2, RRR, CTA B/L  Neuro: A&OX3, CN II-XII grossly intact                          12.9   9.38  )-----------( 277      ( 2021 03:32 )             41.2         139  |  102  |  24.5  ----------------------------<  177  4.4   |  26.0  |  0.65    Ca    8.5      2021 06:14  Phos  4.1       Mg     2.3         TPro  7.2  /  Alb  3.8  /  TBili  0.3  /  DBili  x   /  AST  25  /  ALT  30  /  AlkPhos  140          Assessment/Plan:     Problem List:   1. S/P CATY  ·	VS:   ·	     Every 5 minutes for 3 times,  ·	     Every 15 minute for 3 times,   ·	      Every 30 minutes for 2 times,   ·	     Every 1 hour until discharge.  ·	Bedrest for 1 hour.    2. CVA, PFO  ·	Continue Plavix 75 mg daily and aspirin 81 mg daily.  ·	Continue Lipitor 80 mg daily.  ·	ILR implant    
                                                               [facility]                                                               Office: [address] [address]                                                              Telephone: [phone]. Fax:[phone]                                                                             PROGRESS NOTE  Reason for follow up: TIA  Update: Spoke with patient via [employer]. Patient awaiting CATY to evaluate size of PFO, and ILR today. NPO. No active complaints.   Covid Status: Negative 11/21/21    Review of symptoms:   Cardiac:  No chest pain. No dyspnea. No palpitations.  Respiratory: No cough. No dyspnea  Gastrointestinal: No diarrhea. No abdominal pain. No bleeding.     Past medical history: No updates.   	  Vitals:  T(C): 36.6 (11-22-21 @ 07:46), Max: 36.8 (11-21-21 @ 18:15)  HR: 79 (11-22-21 @ 07:46) (76 - 91)  BP: 113/72 (11-22-21 @ 07:46) (113/72 - 172/90)  RR: 18 (11-22-21 @ 07:46) (18 - 18)  SpO2: 96% (11-22-21 @ 07:46) (96% - 100%)  Wt(kg): --  I&O's Summary    Weight (kg): 110.2 (11-21 @ 13:09), 109.8 (11-18 @ 17:02)      PHYSICAL EXAM:  Appearance: Comfortable. No acute distress  HEENT:  Head and neck: Atraumatic. Normocephalic.  Normal oral mucosa, PERRL, Neck is supple. No JVD, No carotid bruit.   Neurologic: A&Ox 3, no focal deficits. EOMI, Cranial nerves are intact.  Lymphatic: No cervical lymphadenopathy  Cardiovascular: Normal S1 S2, No murmur, rubs/gallops. No JVD, No edema  Respiratory: Lungs clear to auscultation  Gastrointestinal:  Soft, Non-tender, + BS  Lower Extremities: No edema  Psychiatry: Patient is calm. No agitation. Mood & affect appropriate  Skin: No rashes/ecchymoses/cyanosis/ulcers visualized on the face, hands or feet.      CURRENT MEDICATIONS:  lisinopril 10 milliGRAM(s) Oral daily    venlafaxine XR.  atorvastatin  dextrose 40% Gel  dextrose 50% Injectable  dextrose 50% Injectable  dextrose 50% Injectable  glucagon  Injectable  insulin lispro (ADMELOG) corrective regimen sliding scale  aspirin  chewable  clopidogrel Tablet  dextrose 5%.  dextrose 5%.      DIAGNOSTIC TESTING:  [ ] Echocardiogram:   < from: TTE Echo Complete w/ Contrast w/ Doppler (11.19.21 @ 12:26) >  PHYSICIAN INTERPRETATION:  Left Ventricle: Endocardial visualization was enhanced with intravenous echo contrast. The left ventricular internal cavity size is normal. Left ventricular wall thickness is increased.  Global LV systolic function was normal. Left ventricular ejection fraction, by visual estimation, is 60 to 65%. Spectral Doppler shows impaired relaxation pattern of left ventricular myocardial filling (Grade I diastolic dysfunction).  Right Ventricle: Normal right ventricular size and function. The right ventricular size is normal. RV systolic function is normal.  Left Atrium: Mildly enlarged left atrium. Intravenous injection of agitated saline demonstrates the presence of a patent foramen ovale.  Right Atrium: Normal right atrial size.  Pericardium: There is no evidence of pericardial effusion.  Mitral Valve: The mitral valve is normal in structure. Trace mitral valve regurgitation is seen.  Tricuspid Valve: The tricuspid valve is normal in structure. Trivial tricuspid regurgitation is visualized. Adequate TR velocity was not obtained to accurately assess RVSP.  Aortic Valve: The aortic valve is trileaflet. No evidence of aortic valve regurgitation is seen.  Pulmonic Valve: Structurally normal pulmonic valve, with normal leaflet excursion. Trace pulmonic valve regurgitation.  Aorta: The aortic root is normal in size and structure.  Pulmonary Artery: The main pulmonary artery is normal in size.  Venous: The inferior vena cava was normal sized, with respiratory size variation greater than 50%.  Shunts: Agitated saline contrast was given intravenously to evaluate for intracardiac shunting. A moderately sizedpatent foramen ovale is detected, with predominantly right to left shunting across the atrial septum.      Summary:   1. Technically suboptimal study. Endocardial visualization was enhanced with intravenous echo contrast.   2. Mildly enlarged left atrium.   3. Moderately sized patent foramen ovale, with predominantly right to left shunting across the atrial septum.   4. Intravenous injection of agitated saline demonstrates the presence of a patent foramen ovale.   5. There is mild concentric leftventricular hypertrophy.   6. Normal wall motion. Left ventricular ejection fraction, by visual estimation, is 60 to 65%. Grade I diastolic dysfunction.   7. Normal right atrial size.   8. Normal right ventricular size and function.   9. No significant valvular abnormality.  10. There is no evidence of pericardial effusion.    MD Jessica, RPVI Electronically signed on 11/19/2021 at 2:44:32 PM    < end of copied text >    OTHER: 	      LABS:	 	  CARDIAC MARKERS ( 21 Nov 2021 12:57 )  x     / <0.01 ng/mL / x     / x     / x      p-BNP 21 Nov 2021 12:57: x                              12.9   9.38  )-----------( 277      ( 22 Nov 2021 03:32 )             41.2     11-22    137  |  99  |  21.9<H>  ----------------------------<  155<H>  3.9   |  26.0  |  0.60    Ca    9.0      22 Nov 2021 03:32  Phos  4.1     11-22  Mg     2.3     11-22    TPro  7.2  /  Alb  3.8  /  TBili  0.3<L>  /  DBili  x   /  AST  25  /  ALT  30  /  AlkPhos  140<H>  11-22    proBNP:   Lipid Profile: Date: 11-22 @ 03:32  Total cholesterol 150; Direct LDL: --; HDL: 43; Triglycerides:135  Date: 11-19 @ 06:14  Total cholesterol 142; Direct LDL: --; HDL: 46; Triglycerides:133    HgA1c:   TSH:       TELEMETRY: Reviewed  SR        
Patient is a 51y old  Female who presents with a chief complaint of TIA (23 Nov 2021 17:50)    Pt seen and exam. Pt denies sob,cp,weakness,numbness,cp.  s/p CATY 11/23  PLAN for ILR  Pending mri brain    REVIEW OF SYSTEMS: All systems are reviewed and found to be negative except above    MEDICATIONS  (STANDING):  aspirin  chewable 81 milliGRAM(s) Oral daily  atorvastatin 80 milliGRAM(s) Oral at bedtime  clopidogrel Tablet 75 milliGRAM(s) Oral daily  dextrose 40% Gel 15 Gram(s) Oral once  dextrose 5%. 1000 milliLiter(s) (50 mL/Hr) IV Continuous <Continuous>  dextrose 5%. 1000 milliLiter(s) (100 mL/Hr) IV Continuous <Continuous>  dextrose 50% Injectable 25 Gram(s) IV Push once  dextrose 50% Injectable 12.5 Gram(s) IV Push once  dextrose 50% Injectable 25 Gram(s) IV Push once  glucagon  Injectable 1 milliGRAM(s) IntraMuscular once  heparin   Injectable 5000 Unit(s) SubCutaneous every 12 hours  insulin lispro (ADMELOG) corrective regimen sliding scale   SubCutaneous every 6 hours  lisinopril 10 milliGRAM(s) Oral daily  venlafaxine XR. 75 milliGRAM(s) Oral daily    MEDICATIONS  (PRN):  acetaminophen     Tablet .. 650 milliGRAM(s) Oral every 6 hours PRN Mild Pain (1 - 3)  ondansetron Injectable 4 milliGRAM(s) IV Push every 6 hours PRN Nausea and/or Vomiting      CAPILLARY BLOOD GLUCOSE      POCT Blood Glucose.: 156 mg/dL (24 Nov 2021 05:25)  POCT Blood Glucose.: 159 mg/dL (23 Nov 2021 23:07)  POCT Blood Glucose.: 158 mg/dL (23 Nov 2021 21:33)  POCT Blood Glucose.: 131 mg/dL (23 Nov 2021 18:47)  POCT Blood Glucose.: 101 mg/dL (23 Nov 2021 17:45)  POCT Blood Glucose.: 127 mg/dL (23 Nov 2021 12:08)    I&O's Summary      PHYSICAL EXAM:  Vital Signs Last 24 Hrs  T(C): 36.7 (24 Nov 2021 11:00), Max: 36.7 (23 Nov 2021 19:40)  T(F): 98 (24 Nov 2021 11:00), Max: 98.1 (23 Nov 2021 19:40)  HR: 84 (24 Nov 2021 11:00) (78 - 94)  BP: 129/78 (24 Nov 2021 11:00) (129/78 - 146/90)  BP(mean): --  RR: 18 (24 Nov 2021 11:00) (18 - 18)  SpO2: 98% (24 Nov 2021 11:00) (97% - 98%)    CONSTITUTIONAL: NAD,  EYES: PERRLA; conjunctiva and sclera clear  ENMT: Moist oral mucosa,   RESPIRATORY: Normal respiratory effort; lungs are clear to auscultation bilaterally  CARDIOVASCULAR: Regular rate and rhythm, normal S1 and S2, no murmur   EXTS: No lower extremity edema; Peripheral pulses are 2+ bilaterally  ABDOMEN: Nontender to palpation, normoactive bowel sounds, no rebound/guarding;   MUSCLOSKELETAL:   no  cyanosis of digits; no joint swelling or tenderness to palpation  PSYCH: affect appropriate  NEUROLOGY: A+O to person, place, and time; CN 2-12 are intact and symmetric; no gross sensory/motor deficits;       LABS:                        11.9   8.05  )-----------( 272      ( 24 Nov 2021 06:26 )             39.4     11-24    140  |  104  |  21.1<H>  ----------------------------<  164<H>  3.9   |  25.0  |  0.59    Ca    8.6      24 Nov 2021 06:26  Phos  3.0     11-23  Mg     2.3     11-23                  RADIOLOGY & ADDITIONAL TESTS:  Results Reviewed:   
      [facility] [id number]            [facility]                          [address] [place] [address]                       Phone: [phone]. Fax:[phone]                      ________________________________________________    HPI:  50yo F w/pmh DM, HTN and recent observation stay for dizziness p/w persistent dizziness as well as left sided weakness and numbness. Per patient, she was in the ED for dizziness last week, which never really resolved by the time she was discharged. CTH and MRI during that admission was negative. Patient was started on statin and ASA and sent home for outpatient workup. She reports worsening dizziness with associated chest pressure since last night with nausea and NBNB emesis. Went to sleep and woke up this AM withe left sided weakness as well as left facial and numbness. Reports overall exhaustion this AM, and came to the ED for further evaluation.   ROS notable for dizziness (lightheadedness), substernal chest pressure, emesis, as well as diffuse headache.     ED vitals with /125, labs WNL. Noted with right facial droop s/p code stroke. Admitted for further management.   Patient personally denies ETOH/illicit drug use. However, daughter reports that her mother does use cocaine on the weekends and sometimes drinks on the weekend as well.  (2021 14:38)    HOME MEDICATIONS:  Aspirin Enteric Coated 81 mg oral delayed release tablet: 1 tab(s) orally once a day (2021 14:38)  lisinopril 5 mg oral tablet: 1 tab(s) orally once a day (2021 14:38)  metFORMIN 500 mg oral tablet: 1 tab(s) orally 2 times a day (2021 14:38)  venlafaxine 75 mg oral capsule, extended release: 1 cap(s) orally once a day (2021 14:38)      ROS: All review of systems negative unless indicated otherwise below.                         PHYSICAL EXAM:    GENERAL: NAD  NECK: Supple, No JVD  NERVOUS SYSTEM:  Alert & Oriented X3, non focal neuro exam.   CHEST/LUNG: clear lungs, No rales, rhonchi, wheezing, or rubs  HEART: Regular rate and rhythm; s1 and s2 auscultated, No murmurs, rubs, or gallops  ABDOMEN: Soft, Nontender, Nondistended; Bowel sounds present and normoactive.   EXTREMITIES:  2+ Peripheral Pulses, No clubbing, cyanosis, or edema       Vital Signs Last 24 Hrs  T(C): 36.7 (2021 11:00), Max: 36.7 (2021 19:40)  T(F): 98 (2021 11:00), Max: 98.1 (2021 19:40)  HR: 92 (2021 11:22) (78 - 94)  BP: 129/78 (2021 11:00) (129/78 - 146/90)  BP(mean): --  RR: 16 (2021 11:22) (16 - 18)  SpO2: 97% (2021 11:22) (97% - 98%)                                                   DAILY WEIGHTS - 48 HOUR TREND     Daily Weight in k.1 (2021 06:28), Weight in k.6 (2021 05:33)                             INTAKE AND OUTPUT - 48 HOUR TREND                                                          LAB RESULTS                 COMPLETE BLOOD COUNT( 2021 06:26 )                            11.9 g/dL  8.05 K/uL )---------------( 272 K/uL                        39.4 %      Automated Differential     Auto Basophil # - X      Auto Basophil % - X      Auto Eosinophil # - X      Auto Eosinophil % - X      Auto Immature Granulocyte # - X      Auto Immature Granulocyte % - X      Auto Lymphocyte # - X      Auto Lymphocyte % - X      Auto Monocyte # - X      Auto Monocyte % - X      Auto Neutrophil # - X      Auto Neutrophil % - X                                      CHEMISTRY                 Basic Metabolic Panel (21 @ 06:26)    140  |  104  |  21.1<H>  ----------------------------<  164<H>  3.9   |  25.0  |  0.59    Ca    8.6      2021 06:26  Phos  3.0     11-  Mg     2.3     11                    Liver Functions (21 @ 03:32))  TPro  7.2  /  Alb  3.8  /  TBili  0.3  /  DBili  x   /  AST  25  /  ALT  30  /  AlkPhos  140     PT/INR/PTT ( 2021 12:57 )                        :                       :      12.3         :       30.8                  .        .                   .              .           .       1.06        .                                                                 Cardiac Enzymes   ( 2021 12:57 )  Troponin T  <0.01,  CPK  X    , CKMB  X    , BNP X        , ( 2021 16:22 )  Troponin T  <0.01,  CPK  X    , CKMB  X    , BNP X                                 Current Admission Active Medications    acetaminophen     Tablet .. 650 milliGRAM(s) Oral every 6 hours PRN Mild Pain (1 - 3)  aspirin  chewable 81 milliGRAM(s) Oral daily  atorvastatin 80 milliGRAM(s) Oral at bedtime  clopidogrel Tablet 75 milliGRAM(s) Oral daily  dextrose 40% Gel 15 Gram(s) Oral once  dextrose 5%. 1000 milliLiter(s) (50 mL/Hr) IV Continuous <Continuous>  dextrose 5%. 1000 milliLiter(s) (100 mL/Hr) IV Continuous <Continuous>  dextrose 50% Injectable 25 Gram(s) IV Push once  dextrose 50% Injectable 12.5 Gram(s) IV Push once  dextrose 50% Injectable 25 Gram(s) IV Push once  glucagon  Injectable 1 milliGRAM(s) IntraMuscular once  heparin   Injectable 5000 Unit(s) SubCutaneous every 12 hours  insulin lispro (ADMELOG) corrective regimen sliding scale   SubCutaneous every 6 hours  lisinopril 10 milliGRAM(s) Oral daily  ondansetron Injectable 4 milliGRAM(s) IV Push every 6 hours PRN Nausea and/or Vomiting  venlafaxine XR. 75 milliGRAM(s) Oral daily          
Patient is a 51y old  Female who presents with a chief complaint of TIA (2021 12:04)    pt denies any n/v/d/dizziness/headache/weakness/numbness/cp/sob.    REVIEW OF SYSTEMS: All systems are reviewed and found to be negative except above    MEDICATIONS  (STANDING):  aspirin  chewable 81 milliGRAM(s) Oral daily  atorvastatin 80 milliGRAM(s) Oral at bedtime  clopidogrel Tablet 75 milliGRAM(s) Oral daily  dextrose 40% Gel 15 Gram(s) Oral once  dextrose 5%. 1000 milliLiter(s) (50 mL/Hr) IV Continuous <Continuous>  dextrose 5%. 1000 milliLiter(s) (100 mL/Hr) IV Continuous <Continuous>  dextrose 50% Injectable 25 Gram(s) IV Push once  dextrose 50% Injectable 12.5 Gram(s) IV Push once  dextrose 50% Injectable 25 Gram(s) IV Push once  glucagon  Injectable 1 milliGRAM(s) IntraMuscular once  heparin   Injectable 5000 Unit(s) SubCutaneous every 12 hours  insulin lispro (ADMELOG) corrective regimen sliding scale   SubCutaneous every 6 hours  lisinopril 10 milliGRAM(s) Oral daily  sodium chloride 0.9%. 1000 milliLiter(s) (60 mL/Hr) IV Continuous <Continuous>  venlafaxine XR. 75 milliGRAM(s) Oral daily    MEDICATIONS  (PRN):  acetaminophen     Tablet .. 650 milliGRAM(s) Oral every 6 hours PRN Mild Pain (1 - 3)  ondansetron Injectable 4 milliGRAM(s) IV Push every 6 hours PRN Nausea and/or Vomiting      CAPILLARY BLOOD GLUCOSE      POCT Blood Glucose.: 167 mg/dL (2021 08:14)  POCT Blood Glucose.: 181 mg/dL (2021 05:56)  POCT Blood Glucose.: 142 mg/dL (2021 00:03)  POCT Blood Glucose.: 113 mg/dL (2021 17:42)  POCT Blood Glucose.: 127 mg/dL (2021 15:42)  POCT Blood Glucose.: 152 mg/dL (2021 10:46)    I&O's Summary    2021 07:01  -  2021 07:00  --------------------------------------------------------  IN: 420 mL / OUT: 0 mL / NET: 420 mL        PHYSICAL EXAM:  Vital Signs Last 24 Hrs  T(C): 36.5 (2021 09:43), Max: 36.7 (2021 19:50)  T(F): 97.7 (2021 09:43), Max: 98 (2021 19:50)  HR: 78 (2021 09:43) (70 - 92)  BP: 143/89 (2021 09:43) (119/79 - 148/81)  BP(mean): --  RR: 18 (2021 09:43) (18 - 20)  SpO2: 96% (2021 09:43) (96% - 99%)    CONSTITUTIONAL: NAD,  EYES: PERRLA; conjunctiva and sclera clear  ENMT: Moist oral mucosa,   RESPIRATORY: Normal respiratory effort; lungs are clear to auscultation bilaterally  CARDIOVASCULAR: Regular rate and rhythm, normal S1 and S2, no murmur   EXTS: No lower extremity edema; Peripheral pulses are 2+ bilaterally  ABDOMEN: Nontender to palpation, normoactive bowel sounds, no rebound/guarding;   MUSCLOSKELETAL:   no  cyanosis of digits; no joint swelling or tenderness to palpation  PSYCH: affect appropriate  NEUROLOGY: A+O to person, place, and time; CN 2-12 are intact and symmetric; no gross sensory/motor deficits;       LABS:                        12.9   9.38  )-----------( 277      ( 2021 03:32 )             41.2         139  |  102  |  24.5<H>  ----------------------------<  177<H>  4.4   |  26.0  |  0.65    Ca    8.5<L>      2021 06:14  Phos  4.1       Mg     2.3         TPro  7.2  /  Alb  3.8  /  TBili  0.3<L>  /  DBili  x   /  AST  25  /  ALT  30  /  AlkPhos  140<H>      PT/INR - ( 2021 12:57 )   PT: 12.3 sec;   INR: 1.06 ratio         PTT - ( 2021 12:57 )  PTT:30.8 sec  CARDIAC MARKERS ( 2021 12:57 )  x     / <0.01 ng/mL / x     / x     / x          Urinalysis Basic - ( 2021 07:22 )    Color: Yellow / Appearance: Clear / S.025 / pH: x  Gluc: x / Ketone: Trace  / Bili: Negative / Urobili: Negative mg/dL   Blood: x / Protein: 30 mg/dL / Nitrite: Negative   Leuk Esterase: Negative / RBC: 3-5 /HPF / WBC Negative   Sq Epi: x / Non Sq Epi: Many / Bacteria: Few          RADIOLOGY & ADDITIONAL TESTS:  Results Reviewed:     
      [facility] [id number]            [facility]                          [address] [place] [address]                       Phone: [phone]. Fax:[phone]                      ________________________________________________    HPI:  52yo F w/pmh DM, HTN and recent observation stay for dizziness p/w persistent dizziness as well as left sided weakness and numbness. Per patient, she was in the ED for dizziness last week, which never really resolved by the time she was discharged. CTH and MRI during that admission was negative. Patient was started on statin and ASA and sent home for outpatient workup. She reports worsening dizziness with associated chest pressure since last night with nausea and NBNB emesis. Went to sleep and woke up this AM withe left sided weakness as well as left facial and numbness. Reports overall exhaustion this AM, and came to the ED for further evaluation.   ROS notable for dizziness (lightheadedness), substernal chest pressure, emesis, as well as diffuse headache.     ED vitals with /125, labs WNL. Noted with right facial droop s/p code stroke. Admitted for further management.   Patient personally denies ETOH/illicit drug use. However, daughter reports that her mother does use cocaine on the weekends and sometimes drinks on the weekend as well.  (2021 14:38)    HOME MEDICATIONS:  Aspirin Enteric Coated 81 mg oral delayed release tablet: 1 tab(s) orally once a day (2021 14:38)  lisinopril 5 mg oral tablet: 1 tab(s) orally once a day (2021 14:38)  metFORMIN 500 mg oral tablet: 1 tab(s) orally 2 times a day (2021 14:38)  venlafaxine 75 mg oral capsule, extended release: 1 cap(s) orally once a day (2021 14:38)      ROS: All review of systems negative unless indicated otherwise below.                         PHYSICAL EXAM:    GENERAL: NAD  NECK: Supple, No JVD  NERVOUS SYSTEM:  Alert & Oriented X3, non focal neuro exam.   CHEST/LUNG: clear lungs, No rales, rhonchi, wheezing, or rubs  HEART: Regular rate and rhythm; s1 and s2 auscultated, No murmurs, rubs, or gallops  ABDOMEN: Soft, Nontender, Nondistended; Bowel sounds present and normoactive.   EXTREMITIES:  2+ Peripheral Pulses, No clubbing, cyanosis, or edema       Vital Signs Last 24 Hrs  T(C): 36.5 (2021 09:43), Max: 36.7 (2021 19:50)  T(F): 97.7 (2021 09:43), Max: 98 (2021 19:50)  HR: 78 (2021 09:43) (70 - 92)  BP: 143/89 (2021 09:43) (121/83 - 148/81)  BP(mean): --  RR: 18 (2021 09:43) (18 - 19)  SpO2: 96% (2021 09:43) (96% - 99%)                                                   DAILY WEIGHTS - 48 HOUR TREND     Daily Weight in k.6 (2021 05:33)                             INTAKE AND OUTPUT - 48 HOUR TREND                                                          LAB RESULTS                 COMPLETE BLOOD COUNT( 2021 03:32 )                            12.9 g/dL  9.38 K/uL )---------------( 277 K/uL                        41.2 %      Automated Differential     Auto Basophil # - X      Auto Basophil % - X      Auto Eosinophil # - X      Auto Eosinophil % - X      Auto Immature Granulocyte # - X      Auto Immature Granulocyte % - X      Auto Lymphocyte # - X      Auto Lymphocyte % - X      Auto Monocyte # - X      Auto Monocyte % - X      Auto Neutrophil # - X      Auto Neutrophil % - X                                      CHEMISTRY                 Basic Metabolic Panel (21 @ 06:14)    139  |  102  |  24.5<H>  ----------------------------<  177<H>  4.4   |  26.0  |  0.65    Ca    8.5<L>      2021 06:14  Phos  4.1       Mg     2.3                         Liver Functions (21 @ 03:32))  TPro  7.2  /  Alb  3.8  /  TBili  0.3  /  DBili  x   /  AST  25  /  ALT  30  /  AlkPhos  140     PT/INR/PTT ( 2021 12:57 )                        :                       :      12.3         :       30.8                  .        .                   .              .           .       1.06        .                                                                 Cardiac Enzymes   ( 2021 12:57 )  Troponin T  <0.01,  CPK  X    , CKMB  X    , BNP X        , ( 2021 16:22 )  Troponin T  <0.01,  CPK  X    , CKMB  X    , BNP X                                           Current Admission Active Medications    acetaminophen     Tablet .. 650 milliGRAM(s) Oral every 6 hours PRN Mild Pain (1 - 3)  aspirin  chewable 81 milliGRAM(s) Oral daily  atorvastatin 80 milliGRAM(s) Oral at bedtime  clopidogrel Tablet 75 milliGRAM(s) Oral daily  dextrose 40% Gel 15 Gram(s) Oral once  dextrose 5%. 1000 milliLiter(s) (50 mL/Hr) IV Continuous <Continuous>  dextrose 5%. 1000 milliLiter(s) (100 mL/Hr) IV Continuous <Continuous>  dextrose 50% Injectable 25 Gram(s) IV Push once  dextrose 50% Injectable 12.5 Gram(s) IV Push once  dextrose 50% Injectable 25 Gram(s) IV Push once  glucagon  Injectable 1 milliGRAM(s) IntraMuscular once  heparin   Injectable 5000 Unit(s) SubCutaneous every 12 hours  insulin lispro (ADMELOG) corrective regimen sliding scale   SubCutaneous every 6 hours  lisinopril 10 milliGRAM(s) Oral daily  ondansetron Injectable 4 milliGRAM(s) IV Push every 6 hours PRN Nausea and/or Vomiting  sodium chloride 0.9%. 1000 milliLiter(s) (60 mL/Hr) IV Continuous <Continuous>  venlafaxine XR. 75 milliGRAM(s) Oral daily          
Patient is a 51y old  Female who presents with a chief complaint of TIA (2021 08:25)    Pt seen and exam. Pt denies any weakness,numbness,blurry vision,slurry speech. Resolved dizziness.   Pt denies cp,sob,legs swelling,headache    REVIEW OF SYSTEMS: All systems are reviewed and found to be negative except above    MEDICATIONS  (STANDING):  aspirin  chewable 81 milliGRAM(s) Oral daily  atorvastatin 80 milliGRAM(s) Oral at bedtime  clopidogrel Tablet 75 milliGRAM(s) Oral daily  dextrose 40% Gel 15 Gram(s) Oral once  dextrose 5%. 1000 milliLiter(s) (50 mL/Hr) IV Continuous <Continuous>  dextrose 5%. 1000 milliLiter(s) (100 mL/Hr) IV Continuous <Continuous>  dextrose 50% Injectable 25 Gram(s) IV Push once  dextrose 50% Injectable 12.5 Gram(s) IV Push once  dextrose 50% Injectable 25 Gram(s) IV Push once  glucagon  Injectable 1 milliGRAM(s) IntraMuscular once  insulin lispro (ADMELOG) corrective regimen sliding scale   SubCutaneous every 6 hours  lisinopril 10 milliGRAM(s) Oral daily  venlafaxine XR. 75 milliGRAM(s) Oral daily    MEDICATIONS  (PRN):      CAPILLARY BLOOD GLUCOSE      POCT Blood Glucose.: 185 mg/dL (2021 06:41)  POCT Blood Glucose.: 149 mg/dL (2021 03:18)  POCT Blood Glucose.: 164 mg/dL (2021 16:36)  POCT Blood Glucose.: 179 mg/dL (2021 12:42)    I&O's Summary      PHYSICAL EXAM:  Vital Signs Last 24 Hrs  T(C): 36.6 (2021 07:46), Max: 36.8 (2021 18:15)  T(F): 97.8 (2021 07:46), Max: 98.2 (2021 18:15)  HR: 79 (2021 07:46) (76 - 91)  BP: 113/72 (2021 07:46) (113/72 - 172/90)  BP(mean): --  RR: 18 (2021 07:46) (18 - 18)  SpO2: 96% (2021 07:46) (96% - 100%)    CONSTITUTIONAL: NAD,  EYES: PERRLA; conjunctiva and sclera clear  ENMT: Moist oral mucosa,   RESPIRATORY: Normal respiratory effort; lungs are clear to auscultation bilaterally  CARDIOVASCULAR: Regular rate and rhythm, normal S1 and S2, no murmur    EXTS: No lower extremity edema; Peripheral pulses are 2+ bilaterally  ABDOMEN: Nontender to palpation, normoactive bowel sounds, no rebound/guarding;   MUSCLOSKELETAL:   no  cyanosis of digits; no joint swelling or tenderness to palpation  PSYCH: affect appropriate  NEUROLOGY: A+O to person, place, and time; CN 2-12 are intact and symmetric; no gross sensory/motor  deficits;       LABS:                        12.9   9.38  )-----------( 277      ( 2021 03:32 )             41.2         137  |  99  |  21.9<H>  ----------------------------<  155<H>  3.9   |  26.0  |  0.60    Ca    9.0      2021 03:32  Phos  4.1       Mg     2.3         TPro  7.2  /  Alb  3.8  /  TBili  0.3<L>  /  DBili  x   /  AST  25  /  ALT  30  /  AlkPhos  140<H>      PT/INR - ( 2021 12:57 )   PT: 12.3 sec;   INR: 1.06 ratio         PTT - ( 2021 12:57 )  PTT:30.8 sec  CARDIAC MARKERS ( 2021 12:57 )  x     / <0.01 ng/mL / x     / x     / x          Urinalysis Basic - ( 2021 07:22 )    Color: Yellow / Appearance: Clear / S.025 / pH: x  Gluc: x / Ketone: Trace  / Bili: Negative / Urobili: Negative mg/dL   Blood: x / Protein: 30 mg/dL / Nitrite: Negative   Leuk Esterase: Negative / RBC: 3-5 /HPF / WBC Negative   Sq Epi: x / Non Sq Epi: Many / Bacteria: Few          RADIOLOGY & ADDITIONAL TESTS:  Results Reviewed:

## 2021-11-24 NOTE — SWALLOW BEDSIDE ASSESSMENT ADULT - SLP GENERAL OBSERVATIONS
Pt recd awake/upright at EOB, A&A Ox4, 0/10 pain pre/post, tolerating RA no overt distress, +Maltese speaking, language line utilized [id number] for interpretation.

## 2021-11-24 NOTE — DISCHARGE NOTE NURSING/CASE MANAGEMENT/SOCIAL WORK - PATIENT PORTAL LINK FT
You can access the FollowMyHealth Patient Portal offered by [facility] by registering at the following website: [email or web address]. By joining [employer]’s FollowMyHealth portal, you will also be able to view your health information using other applications (apps) compatible with our system.

## 2021-11-24 NOTE — DISCHARGE NOTE PROVIDER - CARE PROVIDER_API CALL
Mayito Sheikh)  Cardiovascular Disease; Internal Medicine  [address], [address]  [place] [address]  Phone: [phone]  Fax: [phone]  Follow Up Time: 1 week    Avinash Langford; PhD)  Neurology; Vascular Neurology  [address]  [place] [address]  Phone: [phone]  Fax: [phone]  Follow Up Time: 1 week    Aldo Lynch)  Neurosurgery  [address] [place] [address]  Phone: [phone]  Fax: [phone]  Follow Up Time: 1 month   Mayito Sheikh)  Cardiovascular Disease; Internal Medicine  [address], [address]  [place] [address]  Phone: [phone]  Fax: [phone]  Follow Up Time: 1 week    Avinash Langford; PhD)  Neurology; Vascular Neurology  [address]  [place] [address]  Phone: [phone]  Fax: [phone]  Follow Up Time: 1 week    Aldo Lynch)  Neurosurgery  [address] [place] [address]  Phone: [phone]  Fax: [phone]  Follow Up Time: 1 month    Daniel Nielson)  Cardiac Electrophysiology; Cardiovascular Disease; Internal Medicine  [address] [place] [address]  Phone: [phone]  Fax: [phone]  Follow Up Time:

## 2021-11-24 NOTE — PROGRESS NOTE ADULT - ATTENDING COMMENTS
Stroke/recurrent TIA:   Repeat MRI pending, initial MRI with no CVA.  PFO noted on TTE. Keep NPO, CATY today, ILR after CATY, Will d/w interventional if PFO closure is feasible at this point given patient's recurrent symptoms.   Chest Pain:  Resolved. Trop neg. Echo nl lvef, no rwma. Outpt ischemia eval.
Pt is seen, examined, chart reviewed, d/w np/pa.  Management as outlined above.  Stroke/recurrent TIA:  s/p CATY showing moderate size PFO with bidirectional shunt.  ILR implant pending. Discussed with Dr lBanchard for evaluation for PFO closure, pt has multiple risk factors that could be contributing to symptomology which need to be ruled out, will pursue aggressive risk factor modification strategy and monitor with loop recorder.   Chest Pain:  Resolved.  Troponin negative x 2.  Outpatient ischemic evaluation.
Pt is seen, examined, chart reviewed, d/w np/pa.  Management as outlined above.  Stroke/recurrent TIA: Neurology following .  PFO noted on TTE.  NO CATY, ILR today.   Discussed with Dr Blanchard for evaluation for PFO closure, pt has multiple risk factors that could be contributing to symptomology which need to be ruled out, will pursue aggressive risk factor modification strategy and monitor with loop recorder.

## 2021-12-09 DIAGNOSIS — Z82.49 FAMILY HISTORY OF ISCHEMIC HEART DISEASE AND OTHER DISEASES OF THE CIRCULATORY SYSTEM: ICD-10-CM

## 2021-12-09 DIAGNOSIS — F14.90 COCAINE USE, UNSPECIFIED, UNCOMPLICATED: ICD-10-CM

## 2021-12-09 DIAGNOSIS — Z72.89 OTHER PROBLEMS RELATED TO LIFESTYLE: ICD-10-CM

## 2021-12-09 DIAGNOSIS — Z87.898 PERSONAL HISTORY OF OTHER SPECIFIED CONDITIONS: ICD-10-CM

## 2021-12-09 DIAGNOSIS — F17.200 NICOTINE DEPENDENCE, UNSPECIFIED, UNCOMPLICATED: ICD-10-CM

## 2021-12-09 RX ORDER — METFORMIN HYDROCHLORIDE 500 MG/1
500 TABLET, COATED ORAL
Qty: 180 | Refills: 3 | Status: ACTIVE | COMMUNITY
Start: 2021-12-09

## 2021-12-09 RX ORDER — ATORVASTATIN CALCIUM 80 MG/1
80 TABLET, FILM COATED ORAL
Qty: 30 | Refills: 0 | Status: ACTIVE | COMMUNITY
Start: 2021-12-09

## 2021-12-09 RX ORDER — VENLAFAXINE HYDROCHLORIDE 75 MG/1
75 CAPSULE, EXTENDED RELEASE ORAL
Qty: 30 | Refills: 3 | Status: ACTIVE | COMMUNITY
Start: 2021-12-09

## 2021-12-09 RX ORDER — LISINOPRIL 10 MG/1
10 TABLET ORAL
Qty: 90 | Refills: 1 | Status: ACTIVE | COMMUNITY
Start: 2021-12-09

## 2021-12-09 NOTE — HISTORY OF PRESENT ILLNESS
[de-identified] : This is a 51 years old woman with PMH of obesity, DM, and HTN.50yo F w/pmh DM, HTN and recent observation stay for dizziness who presented to [facility] for  persistent dizziness as well as left sided weakness and numbness. CTH and MRI during previous admission were negative. Patient was started\par on statin and ASA and sent home for outpatient workup. She was scheduled to follow up with cardiology outpatient for PFO. She then presented to [facility] for persistent worsening dizziness , sp CATY showing moderate size PFO with bidirectional shunt. Due to several AF risk factors,  ILR deemed appropriate for AF screening and was implanted 11/24/21.\par \par INCOMPLETE NOTE NOT YET SEEN

## 2021-12-13 ENCOUNTER — APPOINTMENT (OUTPATIENT)
Dept: ELECTROPHYSIOLOGY | Facility: CLINIC | Age: 51
End: 2021-12-13

## 2022-01-06 PROCEDURE — 80048 BASIC METABOLIC PNL TOTAL CA: CPT

## 2022-01-06 PROCEDURE — 86769 SARS-COV-2 COVID-19 ANTIBODY: CPT

## 2022-01-06 PROCEDURE — 70551 MRI BRAIN STEM W/O DYE: CPT | Mod: MA

## 2022-01-06 PROCEDURE — 80053 COMPREHEN METABOLIC PANEL: CPT

## 2022-01-06 PROCEDURE — 97163 PT EVAL HIGH COMPLEX 45 MIN: CPT

## 2022-01-06 PROCEDURE — 83735 ASSAY OF MAGNESIUM: CPT

## 2022-01-06 PROCEDURE — U0003: CPT

## 2022-01-06 PROCEDURE — 0042T: CPT

## 2022-01-06 PROCEDURE — 96374 THER/PROPH/DIAG INJ IV PUSH: CPT

## 2022-01-06 PROCEDURE — 85730 THROMBOPLASTIN TIME PARTIAL: CPT

## 2022-01-06 PROCEDURE — 36415 COLL VENOUS BLD VENIPUNCTURE: CPT

## 2022-01-06 PROCEDURE — 0225U NFCT DS DNA&RNA 21 SARSCOV2: CPT

## 2022-01-06 PROCEDURE — 81001 URINALYSIS AUTO W/SCOPE: CPT

## 2022-01-06 PROCEDURE — 81025 URINE PREGNANCY TEST: CPT

## 2022-01-06 PROCEDURE — 93312 ECHO TRANSESOPHAGEAL: CPT

## 2022-01-06 PROCEDURE — U0005: CPT

## 2022-01-06 PROCEDURE — 94660 CPAP INITIATION&MGMT: CPT

## 2022-01-06 PROCEDURE — C1764: CPT

## 2022-01-06 PROCEDURE — 85025 COMPLETE CBC W/AUTO DIFF WBC: CPT

## 2022-01-06 PROCEDURE — 82977 ASSAY OF GGT: CPT

## 2022-01-06 PROCEDURE — 99291 CRITICAL CARE FIRST HOUR: CPT

## 2022-01-06 PROCEDURE — 85610 PROTHROMBIN TIME: CPT

## 2022-01-06 PROCEDURE — C8929: CPT

## 2022-01-06 PROCEDURE — 93005 ELECTROCARDIOGRAM TRACING: CPT

## 2022-01-06 PROCEDURE — 93320 DOPPLER ECHO COMPLETE: CPT

## 2022-01-06 PROCEDURE — 86038 ANTINUCLEAR ANTIBODIES: CPT

## 2022-01-06 PROCEDURE — G0378: CPT

## 2022-01-06 PROCEDURE — 70496 CT ANGIOGRAPHY HEAD: CPT | Mod: MC

## 2022-01-06 PROCEDURE — 80061 LIPID PANEL: CPT

## 2022-01-06 PROCEDURE — 83036 HEMOGLOBIN GLYCOSYLATED A1C: CPT

## 2022-01-06 PROCEDURE — 70498 CT ANGIOGRAPHY NECK: CPT | Mod: MA

## 2022-01-06 PROCEDURE — 84100 ASSAY OF PHOSPHORUS: CPT

## 2022-01-06 PROCEDURE — 99285 EMERGENCY DEPT VISIT HI MDM: CPT | Mod: 25

## 2022-01-06 PROCEDURE — 33285 INSJ SUBQ CAR RHYTHM MNTR: CPT

## 2022-01-06 PROCEDURE — 82962 GLUCOSE BLOOD TEST: CPT

## 2022-01-06 PROCEDURE — 93970 EXTREMITY STUDY: CPT

## 2022-01-06 PROCEDURE — 85652 RBC SED RATE AUTOMATED: CPT

## 2022-01-06 PROCEDURE — 96375 TX/PRO/DX INJ NEW DRUG ADDON: CPT | Mod: XU

## 2022-01-06 PROCEDURE — 70450 CT HEAD/BRAIN W/O DYE: CPT | Mod: MA

## 2022-01-06 PROCEDURE — 84484 ASSAY OF TROPONIN QUANT: CPT

## 2022-01-06 PROCEDURE — 80307 DRUG TEST PRSMV CHEM ANLYZR: CPT

## 2022-01-06 PROCEDURE — 93325 DOPPLER ECHO COLOR FLOW MAPG: CPT

## 2022-01-06 PROCEDURE — T1013: CPT

## 2022-01-06 PROCEDURE — 85027 COMPLETE CBC AUTOMATED: CPT

## 2022-01-13 ENCOUNTER — APPOINTMENT (OUTPATIENT)
Dept: ELECTROPHYSIOLOGY | Facility: CLINIC | Age: 52
End: 2022-01-13
Payer: SELF-PAY

## 2022-01-13 ENCOUNTER — NON-APPOINTMENT (OUTPATIENT)
Age: 52
End: 2022-01-13

## 2022-01-13 PROCEDURE — 93298 REM INTERROG DEV EVAL SCRMS: CPT | Mod: NC

## 2022-01-13 PROCEDURE — G2066: CPT | Mod: NC

## 2022-01-24 ENCOUNTER — APPOINTMENT (OUTPATIENT)
Dept: ELECTROPHYSIOLOGY | Facility: CLINIC | Age: 52
End: 2022-01-24

## 2022-02-17 ENCOUNTER — APPOINTMENT (OUTPATIENT)
Dept: ELECTROPHYSIOLOGY | Facility: CLINIC | Age: 52
End: 2022-02-17

## 2022-03-24 ENCOUNTER — APPOINTMENT (OUTPATIENT)
Dept: ELECTROPHYSIOLOGY | Facility: CLINIC | Age: 52
End: 2022-03-24
Payer: MEDICAID

## 2022-03-24 ENCOUNTER — NON-APPOINTMENT (OUTPATIENT)
Age: 52
End: 2022-03-24

## 2022-03-24 PROCEDURE — 93298 REM INTERROG DEV EVAL SCRMS: CPT

## 2022-03-24 PROCEDURE — G2066: CPT | Mod: NC

## 2022-04-28 ENCOUNTER — NON-APPOINTMENT (OUTPATIENT)
Age: 52
End: 2022-04-28

## 2022-04-28 ENCOUNTER — APPOINTMENT (OUTPATIENT)
Dept: ELECTROPHYSIOLOGY | Facility: CLINIC | Age: 52
End: 2022-04-28

## 2022-06-30 NOTE — DISCHARGE NOTE PROVIDER - NSDCCPCAREPLAN_GEN_ALL_CORE_FT
PRINCIPAL DISCHARGE DIAGNOSIS  Diagnosis: TIA (transient ischemic attack)  Assessment and Plan of Treatment: continue aspirin , atorvastatin , monitor blood pressure   need diet , blood gluoce monitoring      SECONDARY DISCHARGE DIAGNOSES  Diagnosis: Diabetes mellitus  Assessment and Plan of Treatment: new diagnoses , check your blood glucose before meals   take your medication , watch diet for carbonhydrate    Diagnosis: Hypertension  Assessment and Plan of Treatment: check your bl;ood pressure and see your family doctor in 3-4 days   keep log of your blood pressure    Diagnosis: Brain aneurysm  Assessment and Plan of Treatment: incidental findings on MR   follwo up with neurologist Yes

## 2022-07-19 NOTE — H&P ADULT - GASTROINTESTINAL DETAILS
no masses palpable/bowel sounds normal/nontender/no distention/no guarding/soft/no rebound tenderness
20-Jul-2022 10:07

## 2023-01-20 ENCOUNTER — EMERGENCY (EMERGENCY)
Facility: HOSPITAL | Age: 53
LOS: 1 days | Discharge: HOME | End: 2023-01-20
Attending: EMERGENCY MEDICINE
Payer: COMMERCIAL

## 2023-01-20 VITALS
HEART RATE: 76 BPM | OXYGEN SATURATION: 95 % | RESPIRATION RATE: 17 BRPM | TEMPERATURE: 98 F | DIASTOLIC BLOOD PRESSURE: 75 MMHG | SYSTOLIC BLOOD PRESSURE: 122 MMHG

## 2023-01-20 VITALS
TEMPERATURE: 98 F | OXYGEN SATURATION: 95 % | RESPIRATION RATE: 18 BRPM | SYSTOLIC BLOOD PRESSURE: 123 MMHG | HEART RATE: 70 BPM | DIASTOLIC BLOOD PRESSURE: 68 MMHG

## 2023-01-20 DIAGNOSIS — Z98.891 HISTORY OF UTERINE SCAR FROM PREVIOUS SURGERY: Chronic | ICD-10-CM

## 2023-01-20 LAB
ALBUMIN SERPL ELPH-MCNC: 3.8 G/DL — SIGNIFICANT CHANGE UP (ref 3.3–5.2)
ALP SERPL-CCNC: 151 U/L — HIGH (ref 40–120)
ALT FLD-CCNC: 19 U/L — SIGNIFICANT CHANGE UP
ANION GAP SERPL CALC-SCNC: 9 MMOL/L — SIGNIFICANT CHANGE UP (ref 5–17)
AST SERPL-CCNC: 17 U/L — SIGNIFICANT CHANGE UP
BASOPHILS # BLD AUTO: 0.04 K/UL — SIGNIFICANT CHANGE UP (ref 0–0.2)
BASOPHILS NFR BLD AUTO: 0.4 % — SIGNIFICANT CHANGE UP (ref 0–2)
BILIRUB SERPL-MCNC: <0.2 MG/DL — LOW (ref 0.4–2)
BUN SERPL-MCNC: 22.4 MG/DL — HIGH (ref 8–20)
CALCIUM SERPL-MCNC: 8.9 MG/DL — SIGNIFICANT CHANGE UP (ref 8.4–10.5)
CHLORIDE SERPL-SCNC: 107 MMOL/L — SIGNIFICANT CHANGE UP (ref 96–108)
CO2 SERPL-SCNC: 26 MMOL/L — SIGNIFICANT CHANGE UP (ref 22–29)
CREAT SERPL-MCNC: 0.58 MG/DL — SIGNIFICANT CHANGE UP (ref 0.5–1.3)
EGFR: 108 ML/MIN/1.73M2 — SIGNIFICANT CHANGE UP
EOSINOPHIL # BLD AUTO: 0.26 K/UL — SIGNIFICANT CHANGE UP (ref 0–0.5)
EOSINOPHIL NFR BLD AUTO: 2.7 % — SIGNIFICANT CHANGE UP (ref 0–6)
FLUAV AG NPH QL: SIGNIFICANT CHANGE UP
FLUBV AG NPH QL: SIGNIFICANT CHANGE UP
GLUCOSE SERPL-MCNC: 143 MG/DL — HIGH (ref 70–99)
HCT VFR BLD CALC: 38.4 % — SIGNIFICANT CHANGE UP (ref 34.5–45)
HGB BLD-MCNC: 12.2 G/DL — SIGNIFICANT CHANGE UP (ref 11.5–15.5)
IMM GRANULOCYTES NFR BLD AUTO: 0.4 % — SIGNIFICANT CHANGE UP (ref 0–0.9)
LYMPHOCYTES # BLD AUTO: 2.42 K/UL — SIGNIFICANT CHANGE UP (ref 1–3.3)
LYMPHOCYTES # BLD AUTO: 25.5 % — SIGNIFICANT CHANGE UP (ref 13–44)
MAGNESIUM SERPL-MCNC: 2.2 MG/DL — SIGNIFICANT CHANGE UP (ref 1.6–2.6)
MCHC RBC-ENTMCNC: 28.8 PG — SIGNIFICANT CHANGE UP (ref 27–34)
MCHC RBC-ENTMCNC: 31.8 GM/DL — LOW (ref 32–36)
MCV RBC AUTO: 90.6 FL — SIGNIFICANT CHANGE UP (ref 80–100)
MONOCYTES # BLD AUTO: 0.76 K/UL — SIGNIFICANT CHANGE UP (ref 0–0.9)
MONOCYTES NFR BLD AUTO: 8 % — SIGNIFICANT CHANGE UP (ref 2–14)
NEUTROPHILS # BLD AUTO: 5.96 K/UL — SIGNIFICANT CHANGE UP (ref 1.8–7.4)
NEUTROPHILS NFR BLD AUTO: 63 % — SIGNIFICANT CHANGE UP (ref 43–77)
PLATELET # BLD AUTO: 273 K/UL — SIGNIFICANT CHANGE UP (ref 150–400)
POTASSIUM SERPL-MCNC: 4.4 MMOL/L — SIGNIFICANT CHANGE UP (ref 3.5–5.3)
POTASSIUM SERPL-SCNC: 4.4 MMOL/L — SIGNIFICANT CHANGE UP (ref 3.5–5.3)
PROT SERPL-MCNC: 7 G/DL — SIGNIFICANT CHANGE UP (ref 6.6–8.7)
RBC # BLD: 4.24 M/UL — SIGNIFICANT CHANGE UP (ref 3.8–5.2)
RBC # FLD: 12.2 % — SIGNIFICANT CHANGE UP (ref 10.3–14.5)
RSV RNA NPH QL NAA+NON-PROBE: SIGNIFICANT CHANGE UP
SARS-COV-2 RNA SPEC QL NAA+PROBE: SIGNIFICANT CHANGE UP
SODIUM SERPL-SCNC: 141 MMOL/L — SIGNIFICANT CHANGE UP (ref 135–145)
WBC # BLD: 9.48 K/UL — SIGNIFICANT CHANGE UP (ref 3.8–10.5)
WBC # FLD AUTO: 9.48 K/UL — SIGNIFICANT CHANGE UP (ref 3.8–10.5)

## 2023-01-20 PROCEDURE — 83735 ASSAY OF MAGNESIUM: CPT

## 2023-01-20 PROCEDURE — 36415 COLL VENOUS BLD VENIPUNCTURE: CPT

## 2023-01-20 PROCEDURE — 71046 X-RAY EXAM CHEST 2 VIEWS: CPT

## 2023-01-20 PROCEDURE — 80053 COMPREHEN METABOLIC PANEL: CPT

## 2023-01-20 PROCEDURE — 99285 EMERGENCY DEPT VISIT HI MDM: CPT | Mod: 25

## 2023-01-20 PROCEDURE — 85025 COMPLETE CBC W/AUTO DIFF WBC: CPT

## 2023-01-20 PROCEDURE — 93005 ELECTROCARDIOGRAM TRACING: CPT

## 2023-01-20 PROCEDURE — 99284 EMERGENCY DEPT VISIT MOD MDM: CPT

## 2023-01-20 PROCEDURE — 93010 ELECTROCARDIOGRAM REPORT: CPT

## 2023-01-20 PROCEDURE — 96360 HYDRATION IV INFUSION INIT: CPT

## 2023-01-20 PROCEDURE — 71046 X-RAY EXAM CHEST 2 VIEWS: CPT | Mod: 26

## 2023-01-20 PROCEDURE — 87637 SARSCOV2&INF A&B&RSV AMP PRB: CPT

## 2023-01-20 RX ORDER — SODIUM CHLORIDE 9 MG/ML
1000 INJECTION INTRAMUSCULAR; INTRAVENOUS; SUBCUTANEOUS ONCE
Refills: 0 | Status: COMPLETED | OUTPATIENT
Start: 2023-01-20 | End: 2023-01-20

## 2023-01-20 RX ORDER — MECLIZINE HCL 12.5 MG
25 TABLET ORAL ONCE
Refills: 0 | Status: COMPLETED | OUTPATIENT
Start: 2023-01-20 | End: 2023-01-20

## 2023-01-20 RX ADMIN — SODIUM CHLORIDE 1000 MILLILITER(S): 9 INJECTION INTRAMUSCULAR; INTRAVENOUS; SUBCUTANEOUS at 20:06

## 2023-01-20 RX ADMIN — SODIUM CHLORIDE 1000 MILLILITER(S): 9 INJECTION INTRAMUSCULAR; INTRAVENOUS; SUBCUTANEOUS at 21:00

## 2023-01-20 RX ADMIN — Medication 25 MILLIGRAM(S): at 20:06

## 2023-01-20 NOTE — ED PROVIDER NOTE - PROGRESS NOTE DETAILS
The patient was re-examined after interventions and is feeling much better.  The patient will follow up with their primary physician this week. -Salas DO

## 2023-01-20 NOTE — ED ADULT NURSE NOTE - OBJECTIVE STATEMENT
assumed pt care at 1930 - pt reports multiple complaints - dizziness, chest pain, and feeling unsteady since yesterday. maintained on cardiac monitor and pulse ox. pt able to pass dysphagia screen. [id number]. breathing even and unlabored. fluids and meclizine given orally.

## 2023-01-20 NOTE — ED ADULT NURSE NOTE - NS ED NURSE IV DC DT
20-Jan-2023 22:21 Scc Moderately Differentiated Histology Text: There were aggregates of invasive moderately-differentiated atypical keratinocytes seen.   The area of positive cancer is as marked on the Mohs map.

## 2023-01-20 NOTE — ED PROVIDER NOTE - PATIENT PORTAL LINK FT
You can access the FollowMyHealth Patient Portal offered by [facility] by registering at the following website: [email or web address]. By joining [employer]’s FollowMyHealth portal, you will also be able to view your health information using other applications (apps) compatible with our system.
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
## 2023-01-20 NOTE — ED PROVIDER NOTE - PHYSICAL EXAMINATION
Gen: NAD, AOx3  Head: NCAT  HEENT: EOMI, oral mucosa moist, normal conjunctiva, neck supple  Lung: CTAB, no respiratory distress  CV: rrr, no murmur, Normal perfusion  Abd: soft, NTND  MSK: No edema, no visible deformities  Neuro: No focal neurologic deficits, CN II-XII intact, 5/5 global strength, sensation intact, no dysmetria/ataxia, gait intact   Skin: No rash   Psych: normal affect

## 2023-01-20 NOTE — ED ADULT NURSE NOTE - CAS DISCH BELONGINGS RETURNED
LMTCB Pt   Needs to get imaging obtained Xray of the Scoliosis and Lumbar before next office visit with Dr Miranda Sexton. Connor date 10/15/18 @ 10:30.
LMTCB Pt   Reminded Pt to bring outside imaging if any to appointment today to review with Dr Piero Michael.
Patient was seen today in the office at appointment. Plan was reviewed at that time.
Not applicable

## 2023-01-20 NOTE — ED PROVIDER NOTE - CLINICAL SUMMARY MEDICAL DECISION MAKING FREE TEXT BOX
51yo F with HTN, HLD, DM, with N/V/D  and dizziness yesterday today with generalized weakness/fatigue and SOB. normal vitals. normal EKG and CXR. labs reviewed no abnormalities requiring treatment/intervention. patient feeling better will dc

## 2023-01-20 NOTE — ED ADULT TRIAGE NOTE - CHIEF COMPLAINT QUOTE
pt c/o dizziness, chest pain, shortness of breath and difficulty walking since 1300 yesterday.  pt with unsteady gait at this time.

## 2023-01-20 NOTE — ED PROVIDER NOTE - NSFOLLOWUPINSTRUCTIONS_ED_ALL_ED_FT
1. Regrese al servicio de urgencias si los síntomas empeoran, son progresivos o cualquier otro síntoma preocupante  2. Negrita un seguimiento con burnette médico de atención primaria en 2-3 días.     Vértigo      El vértigo es la sensación de que usted o todo lo que lo rodea se mueve cuando en realidad eso no sucede. El vértigo puede ser peligroso si ocurre mientras está haciendo algo que podría suponer un riesgo para usted y para los demás, por ejemplo, conduciendo un automóvil.    ¿Cuáles son las causas?  Esta afección es causada por camille alteración en las señales que burnette sistema sensorial envía al cerebro. Existen diferentes factores que pueden causar esta alteración, por ejemplo:    Infecciones, especialmente en el oído interno.  Camille reacción adversa a un medicamento o el uso indebido de alcohol y medicamentos.  Abstinencia de drogas o alcohol.  Cambios rápidos de posición, christopher al acostarse o darse vuelta en la cama.  Cefaleas migrañosas.  Camille disminución del flujo sanguíneo hacia el cerebro.  Camille disminución de la presión arterial.  Un aumento de la presión en el cerebro por un traumatismo en la jun o el paul, accidente cerebrovascular, infección, tumor o sangrado.  Trastornos del sistema nervioso central.    ¿Cuáles son los signos o los síntomas?  Generalmente, los síntomas de celia trastorno se presentan al  la jun o los ojos en diferentes direcciones. Los síntomas pueden aparecer repentinamente y suelen durar menos de un minuto. Entre los síntomas se pueden incluir los siguientes:    Pérdida del equilibrio y caídas.  Sensación de estar dando vueltas o moviéndose.  Sensación de que el entorno está dando vueltas o moviéndose.  Náuseas y vómitos.  Visión doble o borrosa.  Dificultad para escuchar.  Hablar arrastrando las palabras.  Mareos.  Movimientos oculares involuntarios (nistagmo).    Los síntomas pueden ser leves y un poco molestos, o pueden ser graves e interferir con la jorge cotidiana. Los episodios de vértigo pueden repetirse (ser recurrentes) a lo harriett del tiempo y algunos movimientos pueden desencadenarlos. Los síntomas pueden mejorar con el tiempo.    ¿Cómo se diagnostica?  Esta afección puede diagnosticarse en función de la historia clínica y la calidad del nistagmo. Burnette médico podrá examinar el movimiento de kesha ojos indicándole que los cambie de dirección rápidamente para provocar el nistagmo. Fairlee se llama también prueba de [place]Hallpike, prueba de impulso cefálico o prueba de rotación. Yahir vez lo deriven a un médico especialista en oído, nariz y garganta (otorrinolaringólogo) o a honorio que se especializa en trastornos del sistema nervioso central (neurólogo).    Pueden hacerle otros estudios, entre ellos:    Un examen físico.  Análisis de milvia.  Resonancia magnética (RM).  Camille exploración por tomografía computarizada (TC).  Un electrocardiograma (ECG). Celia estudio registra la actividad eléctrica del corazón.  Un electroencefalograma (EEG). Celia estudio registra la actividad eléctrica del cerebro.  Pruebas de audición.    ¿Cómo se trata?  El tratamiento de esta afección depende de la causa y la gravedad de los síntomas. Las opciones de tratamiento incluyen:    Medicamentos para tratar las náuseas o el vértigo. Se utilizan generalmente para los casos graves. Algunos medicamentos que se utilizan para tratar otras afecciones también podrían reducir o eliminar los síntomas del vértigo. Estos incluyen los siguientes:  Medicamentos para controlar las alergias (antihistamínicos).  Medicamentos para controlar las convulsiones (anticonvulsivos).  Medicamentos para aliviar la depresión (antidepresivos).  Medicamentos para aliviar la ansiedad (sedantes).  Movimientos de jun para acomodar el oído interno otra vez a la normalidad. Si el vértigo es causado por un problema en el oído, burnette médico podría recomendarle que negrita ciertos movimientos para corregir el problema.  Cirugía. Fairlee es raro.    Siga estas indicaciones en burnette casa:  Seguridad    Muévase despacio.No negrita movimientos bruscos con el cuerpo o con la jun.  No conduzca.  No opere maquinaria pesada.  No negrita ninguna tarea que podría ser peligrosa para usted o para otras personas en stephy de que ocurriera un episodio de vértigo.  Si tiene dificultad para caminar o mantener el equilibrio, use un bastón para mantener la estabilidad. Si se siente mareado o inestable, siéntese de inmediato.  Retome kesha actividades normales christopher se lo haya indicado el médico. Pregúntele al médico qué actividades son seguras para usted.    Instrucciones generales    Allisonia los medicamentos de venta lupe y los recetados solamente christopher se lo haya indicado el médico.  Evite algunas posiciones o determinados movimientos christopher se lo haya indicado el médico.  Prisca suficiente líquido para mantener la orina lauren o de color amarillo pálido.  Concurra a todas las visitas de seguimiento christopher se lo haya indicado el médico. Fairlee es importante.    Comuníquese con un médico si:  Los medicamentos no le alivian el vértigo o celia empeora.  Tiene fiebre.  Burnette afección empeora o presenta síntomas nuevos.  Kesha familiares o amigos advierten cambios en burnette comportamiento.  Las náuseas o los vómitos empeoran.  Tiene sensación de adormecimiento o de “hormigueo” en camille parte del cuerpo.    Solicite ayuda de inmediato si:  Tiene dificultad para hablar o para moverse.  Esta mareado todo el tiempo.  Se desmaya.  Tiene yessenia de jun intensos.  Tiene debilidad en las delonte, los brazos o las piernas.  Presenta cambios en la audición o la visión.  Siente rigidez en el paul.  Tiene sensibilidad a la adny.    NOTAS ADICIONALES E INSTRUCCIONES    Allisonia meclizine 25mg cada 6 horas para el mareo.  Por favor tenga seguimiento con burnette doctor primario entre 2 nguyen.  Regrese a urgencias por cualquier preocupacion medica.

## 2023-02-20 NOTE — ED ADULT NURSE NOTE - OBJECTIVE STATEMENT
No pt presents to ED c/o dizziness and headache.  upon initial assessment pt noted to be vomiting and to have left leg and left arm weakness.  throughout stay in ED pt with resolution of symptoms, MENCHACA well and with purpose.  resp even and unlabored.  skin warm and dry.  denies abdominal pain.

## 2023-03-09 ENCOUNTER — NON-APPOINTMENT (OUTPATIENT)
Age: 53
End: 2023-03-09

## 2023-03-09 ENCOUNTER — APPOINTMENT (OUTPATIENT)
Dept: ELECTROPHYSIOLOGY | Facility: CLINIC | Age: 53
End: 2023-03-09
Payer: MEDICAID

## 2023-03-09 PROCEDURE — 93298 REM INTERROG DEV EVAL SCRMS: CPT

## 2023-03-09 PROCEDURE — G2066: CPT

## 2023-03-16 ENCOUNTER — INPATIENT (INPATIENT)
Facility: HOSPITAL | Age: 53
LOS: 1 days | Discharge: HOME | DRG: 69 | End: 2023-03-18
Attending: INTERNAL MEDICINE | Admitting: STUDENT IN AN ORGANIZED HEALTH CARE EDUCATION/TRAINING PROGRAM
Payer: MEDICAID

## 2023-03-16 VITALS
HEIGHT: 63 IN | SYSTOLIC BLOOD PRESSURE: 102 MMHG | WEIGHT: 224.43 LBS | RESPIRATION RATE: 18 BRPM | TEMPERATURE: 98 F | HEART RATE: 105 BPM | DIASTOLIC BLOOD PRESSURE: 69 MMHG | OXYGEN SATURATION: 100 %

## 2023-03-16 DIAGNOSIS — I63.9 CEREBRAL INFARCTION, UNSPECIFIED: ICD-10-CM

## 2023-03-16 DIAGNOSIS — Z98.891 HISTORY OF UTERINE SCAR FROM PREVIOUS SURGERY: Chronic | ICD-10-CM

## 2023-03-16 LAB
A1C WITH ESTIMATED AVERAGE GLUCOSE RESULT: 7.1 % — HIGH (ref 4–5.6)
ALBUMIN SERPL ELPH-MCNC: 4.2 G/DL — SIGNIFICANT CHANGE UP (ref 3.3–5.2)
ALP SERPL-CCNC: 115 U/L — SIGNIFICANT CHANGE UP (ref 40–120)
ALT FLD-CCNC: 24 U/L — SIGNIFICANT CHANGE UP
AMPHET UR-MCNC: NEGATIVE — SIGNIFICANT CHANGE UP
ANION GAP SERPL CALC-SCNC: 13 MMOL/L — SIGNIFICANT CHANGE UP (ref 5–17)
APPEARANCE UR: CLEAR — SIGNIFICANT CHANGE UP
APTT BLD: 29.6 SEC — SIGNIFICANT CHANGE UP (ref 27.5–35.5)
AST SERPL-CCNC: 20 U/L — SIGNIFICANT CHANGE UP
BARBITURATES UR SCN-MCNC: NEGATIVE — SIGNIFICANT CHANGE UP
BASOPHILS # BLD AUTO: 0.02 K/UL — SIGNIFICANT CHANGE UP (ref 0–0.2)
BASOPHILS NFR BLD AUTO: 0.3 % — SIGNIFICANT CHANGE UP (ref 0–2)
BENZODIAZ UR-MCNC: NEGATIVE — SIGNIFICANT CHANGE UP
BILIRUB SERPL-MCNC: 0.3 MG/DL — LOW (ref 0.4–2)
BILIRUB UR-MCNC: NEGATIVE — SIGNIFICANT CHANGE UP
BUN SERPL-MCNC: 16.1 MG/DL — SIGNIFICANT CHANGE UP (ref 8–20)
CALCIUM SERPL-MCNC: 9.2 MG/DL — SIGNIFICANT CHANGE UP (ref 8.4–10.5)
CHLORIDE SERPL-SCNC: 102 MMOL/L — SIGNIFICANT CHANGE UP (ref 96–108)
CO2 SERPL-SCNC: 22 MMOL/L — SIGNIFICANT CHANGE UP (ref 22–29)
COCAINE METAB.OTHER UR-MCNC: POSITIVE
COLOR SPEC: YELLOW — SIGNIFICANT CHANGE UP
CREAT SERPL-MCNC: 0.8 MG/DL — SIGNIFICANT CHANGE UP (ref 0.5–1.3)
DIFF PNL FLD: ABNORMAL
EGFR: 88 ML/MIN/1.73M2 — SIGNIFICANT CHANGE UP
EOSINOPHIL # BLD AUTO: 0.05 K/UL — SIGNIFICANT CHANGE UP (ref 0–0.5)
EOSINOPHIL NFR BLD AUTO: 0.7 % — SIGNIFICANT CHANGE UP (ref 0–6)
ESTIMATED AVERAGE GLUCOSE: 157 MG/DL — HIGH (ref 68–114)
GAS PNL BLDV: SIGNIFICANT CHANGE UP
GLUCOSE BLDC GLUCOMTR-MCNC: 145 MG/DL — HIGH (ref 70–99)
GLUCOSE SERPL-MCNC: 143 MG/DL — HIGH (ref 70–99)
GLUCOSE UR QL: NEGATIVE MG/DL — SIGNIFICANT CHANGE UP
HCT VFR BLD CALC: 45.3 % — HIGH (ref 34.5–45)
HGB BLD-MCNC: 14.2 G/DL — SIGNIFICANT CHANGE UP (ref 11.5–15.5)
IMM GRANULOCYTES NFR BLD AUTO: 0.6 % — SIGNIFICANT CHANGE UP (ref 0–0.9)
INR BLD: 1.14 RATIO — SIGNIFICANT CHANGE UP (ref 0.88–1.16)
KETONES UR-MCNC: ABNORMAL
LEUKOCYTE ESTERASE UR-ACNC: NEGATIVE — SIGNIFICANT CHANGE UP
LYMPHOCYTES # BLD AUTO: 1.39 K/UL — SIGNIFICANT CHANGE UP (ref 1–3.3)
LYMPHOCYTES # BLD AUTO: 20.7 % — SIGNIFICANT CHANGE UP (ref 13–44)
MAGNESIUM SERPL-MCNC: 2.1 MG/DL — SIGNIFICANT CHANGE UP (ref 1.8–2.6)
MCHC RBC-ENTMCNC: 28.4 PG — SIGNIFICANT CHANGE UP (ref 27–34)
MCHC RBC-ENTMCNC: 31.3 GM/DL — LOW (ref 32–36)
MCV RBC AUTO: 90.6 FL — SIGNIFICANT CHANGE UP (ref 80–100)
METHADONE UR-MCNC: NEGATIVE — SIGNIFICANT CHANGE UP
MONOCYTES # BLD AUTO: 0.83 K/UL — SIGNIFICANT CHANGE UP (ref 0–0.9)
MONOCYTES NFR BLD AUTO: 12.4 % — SIGNIFICANT CHANGE UP (ref 2–14)
NEUTROPHILS # BLD AUTO: 4.38 K/UL — SIGNIFICANT CHANGE UP (ref 1.8–7.4)
NEUTROPHILS NFR BLD AUTO: 65.3 % — SIGNIFICANT CHANGE UP (ref 43–77)
NITRITE UR-MCNC: NEGATIVE — SIGNIFICANT CHANGE UP
OPIATES UR-MCNC: NEGATIVE — SIGNIFICANT CHANGE UP
PCP SPEC-MCNC: SIGNIFICANT CHANGE UP
PCP UR-MCNC: NEGATIVE — SIGNIFICANT CHANGE UP
PH UR: 5 — SIGNIFICANT CHANGE UP (ref 5–8)
PLATELET # BLD AUTO: 295 K/UL — SIGNIFICANT CHANGE UP (ref 150–400)
POTASSIUM SERPL-MCNC: 3.7 MMOL/L — SIGNIFICANT CHANGE UP (ref 3.5–5.3)
POTASSIUM SERPL-SCNC: 3.7 MMOL/L — SIGNIFICANT CHANGE UP (ref 3.5–5.3)
PROT SERPL-MCNC: 7.8 G/DL — SIGNIFICANT CHANGE UP (ref 6.6–8.7)
PROT UR-MCNC: 30 MG/DL
PROTHROM AB SERPL-ACNC: 13.2 SEC — SIGNIFICANT CHANGE UP (ref 10.5–13.4)
RAPID RVP RESULT: SIGNIFICANT CHANGE UP
RBC # BLD: 5 M/UL — SIGNIFICANT CHANGE UP (ref 3.8–5.2)
RBC # FLD: 12.5 % — SIGNIFICANT CHANGE UP (ref 10.3–14.5)
SARS-COV-2 RNA SPEC QL NAA+PROBE: SIGNIFICANT CHANGE UP
SODIUM SERPL-SCNC: 137 MMOL/L — SIGNIFICANT CHANGE UP (ref 135–145)
SP GR SPEC: 1.02 — SIGNIFICANT CHANGE UP (ref 1.01–1.02)
THC UR QL: NEGATIVE — SIGNIFICANT CHANGE UP
TROPONIN T SERPL-MCNC: <0.01 NG/ML — SIGNIFICANT CHANGE UP (ref 0–0.06)
UROBILINOGEN FLD QL: NEGATIVE MG/DL — SIGNIFICANT CHANGE UP
WBC # BLD: 6.71 K/UL — SIGNIFICANT CHANGE UP (ref 3.8–10.5)
WBC # FLD AUTO: 6.71 K/UL — SIGNIFICANT CHANGE UP (ref 3.8–10.5)

## 2023-03-16 PROCEDURE — 99223 1ST HOSP IP/OBS HIGH 75: CPT

## 2023-03-16 PROCEDURE — G1004: CPT

## 2023-03-16 PROCEDURE — 70498 CT ANGIOGRAPHY NECK: CPT | Mod: 26,MA

## 2023-03-16 PROCEDURE — 0042T: CPT | Mod: MA

## 2023-03-16 PROCEDURE — 93010 ELECTROCARDIOGRAM REPORT: CPT | Mod: 76

## 2023-03-16 PROCEDURE — 70496 CT ANGIOGRAPHY HEAD: CPT | Mod: 26,MA

## 2023-03-16 PROCEDURE — 74177 CT ABD & PELVIS W/CONTRAST: CPT | Mod: 26,MG

## 2023-03-16 PROCEDURE — 93285 PRGRMG DEV EVAL SCRMS IP: CPT | Mod: 26

## 2023-03-16 PROCEDURE — 70551 MRI BRAIN STEM W/O DYE: CPT | Mod: 26

## 2023-03-16 PROCEDURE — 99291 CRITICAL CARE FIRST HOUR: CPT

## 2023-03-16 RX ORDER — DEXTROSE 50 % IN WATER 50 %
15 SYRINGE (ML) INTRAVENOUS ONCE
Refills: 0 | Status: DISCONTINUED | OUTPATIENT
Start: 2023-03-16 | End: 2023-03-18

## 2023-03-16 RX ORDER — INSULIN LISPRO 100/ML
VIAL (ML) SUBCUTANEOUS AT BEDTIME
Refills: 0 | Status: DISCONTINUED | OUTPATIENT
Start: 2023-03-16 | End: 2023-03-18

## 2023-03-16 RX ORDER — LISINOPRIL 2.5 MG/1
5 TABLET ORAL DAILY
Refills: 0 | Status: DISCONTINUED | OUTPATIENT
Start: 2023-03-16 | End: 2023-03-17

## 2023-03-16 RX ORDER — ASPIRIN/CALCIUM CARB/MAGNESIUM 324 MG
1 TABLET ORAL
Qty: 0 | Refills: 0 | DISCHARGE

## 2023-03-16 RX ORDER — ATORVASTATIN CALCIUM 80 MG/1
80 TABLET, FILM COATED ORAL AT BEDTIME
Refills: 0 | Status: DISCONTINUED | OUTPATIENT
Start: 2023-03-16 | End: 2023-03-18

## 2023-03-16 RX ORDER — DEXTROSE 50 % IN WATER 50 %
25 SYRINGE (ML) INTRAVENOUS ONCE
Refills: 0 | Status: DISCONTINUED | OUTPATIENT
Start: 2023-03-16 | End: 2023-03-18

## 2023-03-16 RX ORDER — SODIUM CHLORIDE 9 MG/ML
1000 INJECTION, SOLUTION INTRAVENOUS
Refills: 0 | Status: DISCONTINUED | OUTPATIENT
Start: 2023-03-16 | End: 2023-03-18

## 2023-03-16 RX ORDER — HEPARIN SODIUM 5000 [USP'U]/ML
5000 INJECTION INTRAVENOUS; SUBCUTANEOUS EVERY 8 HOURS
Refills: 0 | Status: DISCONTINUED | OUTPATIENT
Start: 2023-03-16 | End: 2023-03-18

## 2023-03-16 RX ORDER — ONDANSETRON 8 MG/1
4 TABLET, FILM COATED ORAL ONCE
Refills: 0 | Status: COMPLETED | OUTPATIENT
Start: 2023-03-16 | End: 2023-03-16

## 2023-03-16 RX ORDER — CLOPIDOGREL BISULFATE 75 MG/1
75 TABLET, FILM COATED ORAL DAILY
Refills: 0 | Status: DISCONTINUED | OUTPATIENT
Start: 2023-03-16 | End: 2023-03-18

## 2023-03-16 RX ORDER — SODIUM CHLORIDE 9 MG/ML
1000 INJECTION INTRAMUSCULAR; INTRAVENOUS; SUBCUTANEOUS ONCE
Refills: 0 | Status: COMPLETED | OUTPATIENT
Start: 2023-03-16 | End: 2023-03-16

## 2023-03-16 RX ORDER — ONDANSETRON 8 MG/1
4 TABLET, FILM COATED ORAL EVERY 6 HOURS
Refills: 0 | Status: DISCONTINUED | OUTPATIENT
Start: 2023-03-16 | End: 2023-03-18

## 2023-03-16 RX ORDER — PANTOPRAZOLE SODIUM 20 MG/1
40 TABLET, DELAYED RELEASE ORAL
Refills: 0 | Status: DISCONTINUED | OUTPATIENT
Start: 2023-03-16 | End: 2023-03-17

## 2023-03-16 RX ORDER — VENLAFAXINE HCL 75 MG
75 CAPSULE, EXT RELEASE 24 HR ORAL DAILY
Refills: 0 | Status: DISCONTINUED | OUTPATIENT
Start: 2023-03-16 | End: 2023-03-18

## 2023-03-16 RX ORDER — DEXTROSE 50 % IN WATER 50 %
12.5 SYRINGE (ML) INTRAVENOUS ONCE
Refills: 0 | Status: DISCONTINUED | OUTPATIENT
Start: 2023-03-16 | End: 2023-03-18

## 2023-03-16 RX ORDER — INSULIN LISPRO 100/ML
VIAL (ML) SUBCUTANEOUS
Refills: 0 | Status: DISCONTINUED | OUTPATIENT
Start: 2023-03-16 | End: 2023-03-18

## 2023-03-16 RX ORDER — FAMOTIDINE 10 MG/ML
20 INJECTION INTRAVENOUS ONCE
Refills: 0 | Status: COMPLETED | OUTPATIENT
Start: 2023-03-16 | End: 2023-03-16

## 2023-03-16 RX ORDER — GLUCAGON INJECTION, SOLUTION 0.5 MG/.1ML
1 INJECTION, SOLUTION SUBCUTANEOUS ONCE
Refills: 0 | Status: DISCONTINUED | OUTPATIENT
Start: 2023-03-16 | End: 2023-03-18

## 2023-03-16 RX ADMIN — HEPARIN SODIUM 5000 UNIT(S): 5000 INJECTION INTRAVENOUS; SUBCUTANEOUS at 22:23

## 2023-03-16 RX ADMIN — SODIUM CHLORIDE 1000 MILLILITER(S): 9 INJECTION INTRAMUSCULAR; INTRAVENOUS; SUBCUTANEOUS at 14:33

## 2023-03-16 RX ADMIN — ATORVASTATIN CALCIUM 80 MILLIGRAM(S): 80 TABLET, FILM COATED ORAL at 22:23

## 2023-03-16 RX ADMIN — ONDANSETRON 4 MILLIGRAM(S): 8 TABLET, FILM COATED ORAL at 16:36

## 2023-03-16 RX ADMIN — FAMOTIDINE 20 MILLIGRAM(S): 10 INJECTION INTRAVENOUS at 16:36

## 2023-03-16 NOTE — ED PROVIDER NOTE - NS ED ROS FT
General: Denies fever, chills  HEENT: Denies sensory changes, sore throat  Neck: Denies neck pain, neck stiffness  Resp: Denies coughing, SOB  Cardiovascular: Denies CP, palpitations, LE edema  GI: + nausea, vomiting, abdominal pain, diarrhea, constipation, Denies blood in stool  : Denies dysuria, hematuria, frequency, incontinence  MSK: Denies back pain  Neuro: Denies HA, +dizziness, Denies numbness, +weakness  Skin: Denies rashes.

## 2023-03-16 NOTE — ED PROVIDER NOTE - PHYSICAL EXAMINATION
General: Well appearing female in no acute distress  HEENT: Normocephalic, atraumatic. Moist mucous membranes. Oropharynx clear. No lymphadenopathy.  Eyes: No scleral icterus. EOMI. SUSY. visual fields intact b/l.   Neck:. Soft and supple. Full ROM without pain. No midline tenderness  Cardiac: Regular rate and regular rhythm. No murmurs, rubs, gallops. Peripheral pulses 2+ and symmetric. No LE edema.  Resp: Lungs CTAB. Speaking in full sentences. No wheezes, rales or rhonchi.  Abd: Soft, +RLQ tenderness, non-distended. No guarding or rebound. No scars, masses, or lesions.  Back: Spine midline and non-tender. No CVA tenderness.    Skin: No rashes, abrasions, or lacerations.  Neuro: AO x 3. Moves all extremities symmetrically. Motor strength and sensation grossly intact. finger to nose testing intact. ataxic on attempted ambulation, patient appears unsteady, difficulty w/ balancing herself. mild L facial droop.

## 2023-03-16 NOTE — H&P ADULT - NSHPREVIEWOFSYSTEMS_GEN_ALL_CORE
CONSTITUTIONAL: denies fever, chills, fatigue, weakness  HEENT: +blurred vision, denies sore throat  SKIN: denies new lesions, rash  CARDIOVASCULAR: denies chest pain, chest pressure, palpitations  RESPIRATORY: denies shortness of breath, sputum production  GASTROINTESTINAL: + nausea, vomiting, diarrhea, abdominal pain  GENITOURINARY: denies dysuria, discharge  NEUROLOGICAL: denies numbness, headache, focal weakness  MUSCULOSKELETAL: denies new joint pain, muscle aches  HEMATOLOGIC: denies gross bleeding, bruising  LYMPHATICS: denies enlarged lymph nodes, extremity swelling  PSYCHIATRIC: denies recent changes in anxiety, depression  ENDOCRINOLOGIC: denies sweating, cold or heat intolerance

## 2023-03-16 NOTE — H&P ADULT - HISTORY OF PRESENT ILLNESS
Patient is a 53F w/ hx of TIA, PFO, PCOM/LT MCA aneurysm, HTN, T2DM who presents with dizziness. Her LKN 8 pm on 3/15. Patient endorses that today morning she was having blurred vision and difficulty ambulating and her symptoms resolved in the next 6 hours. However, she was feeling dizzy, having headaches, loose water BM and had nausea/vomiting. Of note, patient presented to the hospital in 11/2021 with dizziness and RT-sided facial droop and was found to have aneurysm at the LT MCA bifrucation and moderate PFO, which was medically managed.     In the ED, her vitals were WNL. Labs notable for pH- 7.25, pO2- 91 and pCO2- 45. CODE STROKE called. CTH and CTA H/N negative for acute intracranial hemorrhage and stable 1-2 mm LT PCOMM and LT MCA bifurcation regional aneurysm.  [place] PI [id number]. Patient is a 53F w/ hx of TIA, PFO, PCOM/LT MCA aneurysm, HTN, T2DM who presents with dizziness when walking to the bathroom. Her LKN 8 pm on 3/15. Patient endorses that today morning she was having blurred vision and difficulty ambulating and her symptoms resolved in the next 6 hours. However, she was feeling dizzy, having headaches, loose water BM since 3/13 and had nausea/vomiting. Also, has diffuse abdominal pain. Of note, patient presented to the hospital in 11/2021 with dizziness and RT-sided facial droop and was found to have aneurysm at the LT MCA bifurcation and moderate PFO, which was medically managed. Denies fever, chills headache, chills, chest pain, and SOB    In the ED, her vitals were WNL. Labs notable for pH- 7.25, pO2- 91 and pCO2- 45. CODE STROKE called. CTH and CTA H/N negative for acute intracranial hemorrhage and stable 1-2 mm LT PCOMM and LT MCA bifurcation regional aneurysm.

## 2023-03-16 NOTE — PROCEDURE NOTE - ADDITIONAL PROCEDURE DETAILS
DOI: 9/24/2021 by Dr. Nielson for evaluation of Cryptogenic stroke  Current ECG at time of assessment: Sinus rhythm with intact conduction  0% AT / AF appreciated  Good heart rate variability

## 2023-03-16 NOTE — ED ADULT NURSE NOTE - DRUG PRE-SCREENING (DAST -1)
Statement Selected Xelbrandoz Pregnancy And Lactation Text: This medication is Pregnancy Category D and is not considered safe during pregnancy.  The risk during breast feeding is also uncertain.

## 2023-03-16 NOTE — ED ADULT NURSE REASSESSMENT NOTE - NS ED NURSE REASSESS COMMENT FT1
Pt ambulates to BR without difficulty. Report given to WOLFGANG Harvey.  Pt to go to MRI and then transported to [facility].

## 2023-03-16 NOTE — CONSULT NOTE ADULT - SUBJECTIVE AND OBJECTIVE BOX
ID [id number]                               [facility] Stroke Team    Consult Note     CC: Dizziness   HPI:52 y/o F hx of TIA, PFO on echo, ILR, PCOM/L MCA aneurysms from prior imaging, HTN, DM presented w/ dizziness. last known well 8PM 1 day prior (>12 hours prior to presentation). Woke up at 6AM day of admission w/ blurry vision and difficulty ambulating. Noted that symptoms were present from 6 AM to 12 PM day of admission before resolving. Endorsing dizziness that is worse w/ movement/changing positions. endorsing nausea/vomiting as well, 3-4 episodes  which were non-bloody in nature. Endorses headache diffuse in nature, described as a "buzzing sensation." Endorsing diarrhea.     PAST MEDICAL & SURGICAL HISTORY:  HTN (hypertension)  HLD (hyperlipidemia)  Diabetes  H/O:     MEDICATIONS  (STANDING): See H/P    MEDICATIONS  (PRN):    Allergies  No Known Allergies    Intolerances    SOCIAL HISTORY:   tob, 1 cigarette /week  no alcohol   no drugs    FAMILY HISTORY:  Family history of hypertension in mother  she is alive 85 yo    Family history of coronary artery disease (Father)  father  at age 64 from MI    ROS: 14 point ROS negative other than what is present in HPI or below. Dizziness prior, denies now. RN noted syncopal episode prior to CT.     Vital Signs Last 24 Hrs  T(C): 36.8 (16 Mar 2023 12:13), Max: 36.8 (16 Mar 2023 12:13)  T(F): 98.2 (16 Mar 2023 12:13), Max: 98.2 (16 Mar 2023 12:13)  HR: 105 (16 Mar 2023 12:13) (105 - 105)  BP: 102/69 (16 Mar 2023 12:13) (102/69 - 102/69)  BP(mean): --  RR: 18 (16 Mar 2023 12:13) (18 - 18)  SpO2: 100% (16 Mar 2023 12:13) (100% - 100%)    Parameters below as of 16 Mar 2023 12:13  Patient On (Oxygen Delivery Method): room air    General: NAD    Detailed Neurologic Exam:    Mental status: The patient is awake and alert and has normal attention span.  The patient is fully oriented in 3 spheres. The patient is oriented to current events. The patient is able to name objects, follow commands, repeat sentences.    Cranial nerves: Pupils equal and react symmetrically to light. There is no visual field deficit to confrontation. Extraocular motion is full with no nystagmus. There is no ptosis. Facial sensation is intact. Facial musculature is symmetric. Palate elevates symmetrically. Tongue is midline.    Motor: There is normal bulk and tone.  There is no tremor.  Strength is 5/5 in the right arm and leg.   Strength is 5/5 in the left arm and leg.    Sensation: Intact to light touch, but reports decreased mildly on left arm and leg     Reflexes: 1-2+ throughout and plantar responses are flexor.    Cerebellar: There is no dysmetria on finger to nose testing.    Gait : deferred    [facility] SS:  DATE: 3/16/23  TIME: 1535  1A: Level of consciousness (0-3):   1B: Questions (0-2):   1C: Commands (0-2):   2: Gaze (0-2):   3: Visual fields (0-3):   4: Facial palsy (0-3):   MOTOR:  5A: Left arm motor drift (0-4):   5B: Right arm motor drift (0-4):   6A: Left leg motor drift (0-4):   6B: Right leg motor drift (0-4):   7: Limb ataxia (0-2):   SENSORY:  8: Sensation (0-2): 1  SPEECH:  9: Language (0-3):   10: Dysarthria (0-2):   EXTINCTION:  11: Extinction/inattention (0-2):     TOTAL SCORE: 1    prehospital mRS=0      LABS:                         14.2   6.71  )-----------( 295      ( 16 Mar 2023 13:50 )             45.3       03-16    137  |  102  |  16.1  ----------------------------<  143<H>  3.7   |  22.0  |  0.80    Ca    9.2      16 Mar 2023 13:50    TPro  7.8  /  Alb  4.2  /  TBili  0.3<L>  /  DBili  x   /  AST  20  /  ALT  24  /  AlkPhos  115  03-16      PT/INR - ( 16 Mar 2023 13:50 )   PT: 13.2 sec;   INR: 1.14 ratio         PTT - ( 16 Mar 2023 13:50 )  PTT:29.6 sec    Lipid panel:    HgbA1c:      RADIOLOGY & ADDITIONAL STUDIES (independently reviewed unless otherwise noted):    CT Brain/CT Angio Brain/Neck  Stroke Protocol  w/ IV Cont (23 @ 14:08)   IMPRESSION:  NO EVIDENCE OF AN ACUTE INTRACRANIAL HEMORRHAGE, MIDLINE SHIFT, OR   HYDROCEPHALUS.  NO HEMODYNAMIC SIGNIFICANT NARROWING WITHIN THE NECK.  NO PROXIMAL LARGE VESSEL OCCLUSION INTRACRANIALLY.  STABLE 1 TO 2 MM LEFT POSTERIOR COMMUNICATING AND LEFT MCA BIFURCATION   REGION ANEURYSMS.  SMALL REGIONS OF DECREASED PERFUSION/TISSUE AT RISK RIGHT TEMPORAL   OCCIPITAL REGION OF 11 ML. NO CORE INFARCT SEEN.      ID [id number]                               [facility] Stroke Team    Consult Note     CC: Dizziness   HPI:52 y/o F hx of TIA, PFO on echo, ILR, PCOM/L MCA aneurysms from prior imaging, HTN, DM presented w/ dizziness. last known well 8PM 1 day prior (>12 hours prior to presentation). Woke up at 6AM day of admission w/ blurry vision and difficulty ambulating. Noted that symptoms were present from 6 AM to 12 PM day of admission before resolving. Endorsing dizziness that is worse w/ movement/changing positions. endorsing nausea/vomiting as well, 3-4 episodes  which were non-bloody in nature. Endorses headache diffuse in nature, described as a "buzzing sensation." Endorsing diarrhea.     PAST MEDICAL & SURGICAL HISTORY:  HTN (hypertension)  HLD (hyperlipidemia)  Diabetes  H/O:     MEDICATIONS  (STANDING): See H/P    MEDICATIONS  (PRN):    Allergies  No Known Allergies    Intolerances    SOCIAL HISTORY:   tob, 1 cigarette /week  no alcohol   no drugs    FAMILY HISTORY:  Family history of hypertension in mother  she is alive 83 yo    Family history of coronary artery disease (Father)  father  at age 64 from MI    ROS: 14 point ROS negative other than what is present in HPI or below. Dizziness prior, denies now. RN noted syncopal episode prior to CT.     Vital Signs Last 24 Hrs  T(C): 36.8 (16 Mar 2023 12:13), Max: 36.8 (16 Mar 2023 12:13)  T(F): 98.2 (16 Mar 2023 12:13), Max: 98.2 (16 Mar 2023 12:13)  HR: 105 (16 Mar 2023 12:13) (105 - 105)  BP: 102/69 (16 Mar 2023 12:13) (102/69 - 102/69)  BP(mean): --  RR: 18 (16 Mar 2023 12:13) (18 - 18)  SpO2: 100% (16 Mar 2023 12:13) (100% - 100%)    Parameters below as of 16 Mar 2023 12:13  Patient On (Oxygen Delivery Method): room air    General: NAD    Detailed Neurologic Exam:    Mental status: The patient is awake and alert and has normal attention span.  The patient is fully oriented in 3 spheres. The patient is oriented to current events. The patient is able to name objects, follow commands, repeat sentences.    Cranial nerves: Pupils equal and react symmetrically to light. There is no visual field deficit to confrontation. Extraocular motion is full with no nystagmus. There is no ptosis. Facial sensation is intact. Facial musculature is symmetric. Palate elevates symmetrically. Tongue is midline.    Motor: There is normal bulk and tone.  There is no tremor.  Strength is 5/5 in the right arm and leg.   Strength is 5/5 in the left arm and leg.    Sensation: Intact to light touch, but reports decreased mildly on left arm and leg     Reflexes: 1+ throughout and plantar responses are flexor.    Cerebellar: There is no dysmetria on finger to nose testing.    Gait : deferred    [facility] SS:  DATE: 3/16/23  TIME: 1535  1A: Level of consciousness (0-3):   1B: Questions (0-2):   1C: Commands (0-2):   2: Gaze (0-2):   3: Visual fields (0-3):   4: Facial palsy (0-3):   MOTOR:  5A: Left arm motor drift (0-4):   5B: Right arm motor drift (0-4):   6A: Left leg motor drift (0-4):   6B: Right leg motor drift (0-4):   7: Limb ataxia (0-2):   SENSORY:  8: Sensation (0-2): 1  SPEECH:  9: Language (0-3):   10: Dysarthria (0-2):   EXTINCTION:  11: Extinction/inattention (0-2):     TOTAL SCORE: 1    prehospital mRS=0      LABS:                         14.2   6.71  )-----------( 295      ( 16 Mar 2023 13:50 )             45.3       03-16    137  |  102  |  16.1  ----------------------------<  143<H>  3.7   |  22.0  |  0.80    Ca    9.2      16 Mar 2023 13:50    TPro  7.8  /  Alb  4.2  /  TBili  0.3<L>  /  DBili  x   /  AST  20  /  ALT  24  /  AlkPhos  115  03-16      PT/INR - ( 16 Mar 2023 13:50 )   PT: 13.2 sec;   INR: 1.14 ratio         PTT - ( 16 Mar 2023 13:50 )  PTT:29.6 sec    Lipid panel: pending    HgbA1c: pending      RADIOLOGY & ADDITIONAL STUDIES (independently reviewed unless otherwise noted):    CT Brain/CT Angio Brain/Neck  Stroke Protocol  w/ IV Cont (23 @ 14:08)   IMPRESSION:  NO EVIDENCE OF AN ACUTE INTRACRANIAL HEMORRHAGE, MIDLINE SHIFT, OR   HYDROCEPHALUS.  NO HEMODYNAMIC SIGNIFICANT NARROWING WITHIN THE NECK.  NO PROXIMAL LARGE VESSEL OCCLUSION INTRACRANIALLY.  STABLE 1 TO 2 MM LEFT POSTERIOR COMMUNICATING AND LEFT MCA BIFURCATION   REGION ANEURYSMS.  SMALL REGIONS OF DECREASED PERFUSION/TISSUE AT RISK RIGHT TEMPORAL   OCCIPITAL REGION OF 11 ML. NO CORE INFARCT SEEN.

## 2023-03-16 NOTE — H&P ADULT - ASSESSMENT
Patient is a 53F w/ hx of TIA, PFO, PCOM/LT MCA aneurysm, HTN, T2DM who presents with dizziness.    #TIA/CVA  - w/ dizziness, blurred vision and difficulty ambulating. NIHSS- 3  - outside tPA window  - will c/w ASA/Plavix and lipitor  - F/u MRI-H  - neuro recs appreciated  - F/u Utox    #Moderate PFO  - [facility] cards c/s placed  - F/u TTE    #Nausea/Vomiting  - F/u GI PCR and stool studies  - Will give maintenance IVF  - F/u CT A/P  - zofran PRN    #T2DM  - on metformin  - will c/w SSI    DVT ppx  Diet- Passed bedside S/S evaluation. Pureed diet. Will ADAT  Dispo- Pending clinical improvement. PT eval pending    Updated patient's daughter, Felisha Rodriguez ([phone]) on 3/16. Patient is a 53F w/ hx of TIA, PFO, PCOM/LT MCA aneurysm, HTN, T2DM who presents with dizziness.    #TIA/CVA  - w/ dizziness, blurred vision and difficulty ambulating. NIHSS- 3  - outside tPA window  - will c/w Plavix and lipitor  - F/u MRI-H non contrast  - neuro recs appreciated  - F/u Utox  - F/u PT/OT and S/S evaluation    #Moderate PFO  - [facility] cards c/s placed for ILR evaluation  - F/u TTE    #Nausea/Vomiting  - F/u GI PCR and stool studies  - Will give maintenance IVF  - F/u CT A/P  - zofran PRN  - if no improvement, will consult GI    #T2DM  - on metformin  - will c/w SSI    #HTN  - c/w lisinopril    DVT ppx  Diet- Passed bedside S/S evaluation. Pureed diet. Will ADAT  Dispo- Pending clinical improvement. PT eval pending    Med rec from [facility] from [place] ([phone]).    Updated patient's daughter, Chas Rodriguez ([phone]) on 3/16. Patient is a 53F w/ hx of TIA, PFO, PCOM/LT MCA aneurysm, HTN, T2DM who presents with dizziness.    #TIA/CVA  - w/ dizziness, blurred vision and difficulty ambulating. NIHSS- 3  - outside tPA window  - will c/w Plavix and lipitor  - F/u MRI-H non contrast  - neuro recs appreciated  - F/u Utox  - F/u PT/OT and S/S evaluation    #Moderate PFO  - [facility] cards c/s placed for ILR evaluation  - F/u TTE    #Nausea/Vomiting  - F/u GI PCR and stool studies  - Will give maintenance IVF  - F/u CT A/P- No evidence of acute inflammatory or obstructive process in the abdomen and pelvis.  - zofran PRN  - if no improvement, will consult GI    #T2DM  - on metformin  - will c/w SSI    #HTN  - c/w lisinopril    DVT ppx- heparin  Diet- Passed bedside S/S evaluation. Pureed diet. Will ADAT  Dispo- Pending clinical improvement. PT eval pending    Med rec from [facility] from [place] ([phone]).    Updated patient's daughter, Chas Rodriguez ([phone]) on 3/16.

## 2023-03-16 NOTE — ED PROVIDER NOTE - CRITICAL CARE ATTENDING CONTRIBUTION TO CARE
53yoF; with PMH signif for TIA, PFO, L MCA and PCOMM aneurysms on prior imaging, HTN, MD; now p/w dizziness. LKW 8pm.  awoke this mornign with blurry vision and dizziness with ambulation today.  states feels dizziness with movement, not at rest.  reports over past 3 days, patient had been having abd pain--epigastric, cramping, associated with nausea/vomiting 3-4 episodes daily--nbnb.  also c/o diarrhea--3 episodes every hour for past 3 days.  reports decreased PO 2/2 n/v/d.  denies fever. c/o chills and sweats. awoke this morning with unsteadiness and blurry vision, which has resolved after sitting and resting.  Gen: Alert, NAD  Head: NC, AT, PERRL, EOMI, normal lids/conjunctiva, blurry vision over r upper quadrant bilaterally  Neck: +supple, no tenderness/meningismus/JVD, +Trachea midline  Pulm: Bilateral BS, normal resp effort, no wheeze/stridor/retractions  CV: RRR, no M/R/G, 2+dist pulses  Abd: soft, NT/ND, +BS, no hepatosplenomegaly  Mskel: ROM intact x4 extremities.  no edema/erythema/cyanosis  Skin: no rash, warm, dry  Neuro: AAOx3, no sensory/motor deficits, CN 2-12 intact, NIHSS=1  A/P: 53yoF p/w dizziness and blurry vision  -code stroke called for dizziness <24hours in duration and patient syncopized after ambulating with physician, will hydrate, do labs, and re-eval.     Upon my evaluation, this patient had a high probability of imminent or life-threatening deterioration due to  ACUTE SYNCOPE AND DIZZINESS , which required my direct attention, intervention, and personal management.    I have personally provided 35 minutes of critical care time exclusive of time spent on separately billable procedures.  Time includes review of laboratory data, radiology results, discussion with consultants, and monitoring for potential decompensation.  Interventions were performed as documented.

## 2023-03-16 NOTE — ED ADULT TRIAGE NOTE - CHIEF COMPLAINT QUOTE
pt presents c/o vomiting and diarrhea since Monday, unable to keep anything down. + very dry mucous membranes,  c/o dizziness and lightheadedness since this morning + c/o chest pain and sob. GCS 15, gross neuro intact.

## 2023-03-16 NOTE — H&P ADULT - NSHPPHYSICALEXAM_GEN_ALL_CORE
Vital Signs Last 24 Hrs  T(F): 98.2 (16 Mar 2023 12:13), Max: 98.2 (16 Mar 2023 12:13)  HR: 105 (16 Mar 2023 12:13) (105 - 105)  BP: 102/69 (16 Mar 2023 12:13) (102/69 - 102/69)  RR: 18 (16 Mar 2023 12:13) (18 - 18)  SpO2: 100% (16 Mar 2023 12:13) (100% - 100%)    Physical Exam:  Constitutional: NAD, awake and alert, well-developed  Neck: Soft and supple, No LAD, No JVD  Respiratory: cta b/l no wheezing no rhonchi  Cardiovascular: +s1/s2 no edema b/l le  Gastrointestinal: soft nt nd bs+  Vascular: 2+ peripheral pulses  Neurological: A/O x 3, no focal deficits  Musculoskeletal: 5/5 strength b/l upper and lower extremities  Skin:  No obvious pathologic skin lesions   Hematologic / Lymph / Immunologic: No bleeding from IV sites or wounds, No lymphadenopathy, No Hives or allergic type skin lesions Vital Signs Last 24 Hrs  T(F): 98.2 (16 Mar 2023 12:13), Max: 98.2 (16 Mar 2023 12:13)  HR: 105 (16 Mar 2023 12:13) (105 - 105)  BP: 102/69 (16 Mar 2023 12:13) (102/69 - 102/69)  RR: 18 (16 Mar 2023 12:13) (18 - 18)  SpO2: 100% (16 Mar 2023 12:13) (100% - 100%)    Physical Exam:  Constitutional: NAD, awake and alert  HEENT- LT-sided facial droop  Neck: Soft and supple, No LAD, No JVD  Respiratory: cta b/l no wheezing no rhonchi  Cardiovascular: +s1/s2 no edema b/l le  Gastrointestinal: mild tenderness to palpation, nd bs+  Vascular: 2+ peripheral pulses  Neurological: A/O x 3, following commands  Musculoskeletal: 5/5 strength b/l upper and lower extremities

## 2023-03-16 NOTE — ED ADULT NURSE NOTE - NSIMPLEMENTINTERV_GEN_ALL_ED
Implemented All Fall Risk Interventions:  [place] to call system. Call bell, personal items and telephone within reach. Instruct patient to call for assistance. Room bathroom lighting operational. Non-slip footwear when patient is off stretcher. Physically safe environment: no spills, clutter or unnecessary equipment. Stretcher in lowest position, wheels locked, appropriate side rails in place. Provide visual cue, wrist band, yellow gown, etc. Monitor gait and stability. Monitor for mental status changes and reorient to person, place, and time. Review medications for side effects contributing to fall risk. Reinforce activity limits and safety measures with patient and family.

## 2023-03-16 NOTE — PROCEDURE NOTE - NSINFORMCONSENT_GEN_A_CORE
ED : Abrahan/Benefits, risks, and possible complications of procedure explained to patient/caregiver who verbalized understanding and gave verbal consent.

## 2023-03-16 NOTE — H&P ADULT - NSHPSOCIALHISTORY_GEN_ALL_CORE
Patient reports smoking on the weekends, denies ETOH nor illicit drug use. Per daughter Pily, patient smokes cocaine on the weekend and uses ETOH on the weekend and sometimes during one weekday as well. Patients lives with her  and daughter. Denies smoking and drinking alcohol. Did cocaine 3-4 months ago.

## 2023-03-16 NOTE — ED PROVIDER NOTE - PROGRESS NOTE DETAILS
Given the significant and immediate threats to this patient based on initial presentation, the benefits of emergency contrast-enhanced CT imaging without obtaining GFR/creatinine serum level results greatly outweigh the potential risk of harm due to contrast-induced nephropathy. -Parker consulted Dr. Langford, who will come evaluate patient.

## 2023-03-16 NOTE — ED PROVIDER NOTE - OBJECTIVE STATEMENT
Santhosh at bedside for evaluation.   54 y/o F hx of TIA, PFO on echo, PCOM/L MCA aneurysms from prior imaging, HTN, DM presents w/ dizziness. last known well 8PM 1 day ago (>12 hours prior to presentation). woke up at 6AM today w/ blurry vision and difficulty ambulating. states that symptoms were present from 6 AM to 12 PM today before resolving. endorsing dizziness that is worse w/ movement/changing positions. endorsing nausea/vomiting as well, 3-4 episodes today which are non-bloody in nature. Endorses headache diffuse in nature, described as a "buzzing sensation." Endorsing RLQ abdominal pain for the past 5 days and intermittent episodes of loose watery bowel movement as well.

## 2023-03-16 NOTE — ED ADULT NURSE REASSESSMENT NOTE - NS ED NURSE REASSESS COMMENT FT1
Late Entry:  @1350 pt had a syncopal episode after she stated she felt like she was going to pass out.  Pt was on her way to CT scan at the time.  Dr Parker called to bedside and pt opened her eyes after a sternal rub and was taken to ct scan.

## 2023-03-16 NOTE — ED PROVIDER NOTE - CLINICAL SUMMARY MEDICAL DECISION MAKING FREE TEXT BOX
52 y/o F hx of TIA, PFO on echo, PCOM/L MCA aneurysms from prior imaging, HTN, DM presents w/ dizziness. last known well 8PM 1 day ago (>12 hours prior to presentation). woke up at 6AM today w/ blurry vision and difficulty ambulating.   code stroke activated upon assessment. NIHSS 3, mild L facial droop w/ ataxia on attempted ambulation at bedside. RLQ abdominal tenderness. BP soft w/ tachycardia, likely volume depletion, will give fluid bolus. CT abd/pelvis - r/o intra-abdominal pathology. check EKG, urine, trop. will require admission for stroke vs TIA. will require MRI. patient is outside tPA window, no LVO seen on CTA per radiologist. 52 y/o F hx of TIA, PFO on echo, PCOM/L MCA aneurysms from prior imaging, HTN, DM presents w/ dizziness. last known well 8PM 1 day ago (>12 hours prior to presentation). woke up at 6AM today w/ blurry vision and difficulty ambulating.   code stroke activated upon assessment. NIHSS 3, mild L facial droop w/ ataxia on attempted ambulation at bedside. RLQ abdominal tenderness. BP soft w/ tachycardia, likely volume depletion, will give fluid bolus. possible viral syndrome given n/v/d. afebrile. CT abd/pelvis - r/o intra-abdominal pathology. check EKG, urine, trop. will require admission for stroke vs TIA. will require MRI. patient is outside tPA window, no LVO seen on CTA per radiologist.

## 2023-03-16 NOTE — H&P ADULT - NSHPLABSRESULTS_GEN_ALL_CORE
Personally reviewed available labs, imaging and ekg     Labs  CBC Full  -  ( 16 Mar 2023 13:50 )  WBC Count : 6.71 K/uL  RBC Count : 5.00 M/uL  Hemoglobin : 14.2 g/dL  Hematocrit : 45.3 %  Platelet Count - Automated : 295 K/uL  Mean Cell Volume : 90.6 fl  Mean Cell Hemoglobin : 28.4 pg  Mean Cell Hemoglobin Concentration : 31.3 gm/dL  Auto Neutrophil # : 4.38 K/uL  Auto Lymphocyte # : 1.39 K/uL  Auto Monocyte # : 0.83 K/uL  Auto Eosinophil # : 0.05 K/uL  Auto Basophil # : 0.02 K/uL  Auto Neutrophil % : 65.3 %  Auto Lymphocyte % : 20.7 %  Auto Monocyte % : 12.4 %  Auto Eosinophil % : 0.7 %  Auto Basophil % : 0.3 %    03-16    137  |  102  |  16.1  ----------------------------<  143<H>  3.7   |  22.0  |  0.80    Ca    9.2      16 Mar 2023 13:50    TPro  7.8  /  Alb  4.2  /  TBili  0.3<L>  /  DBili  x   /  AST  20  /  ALT  24  /  AlkPhos  115  03-16    PT/INR - ( 16 Mar 2023 13:50 )   PT: 13.2 sec;   INR: 1.14 ratio         PTT - ( 16 Mar 2023 13:50 )  PTT:29.6 sec    CAPILLARY BLOOD GLUCOSE        CARDIAC MARKERS ( 16 Mar 2023 13:50 )  x     / <0.01 ng/mL / x     / x     / x                Imaging  < from: CT Abdomen and Pelvis w/ IV Cont (03.16.23 @ 14:09) >    IMPRESSION: No evidence of acute inflammatory or obstructive process in   the abdomen and pelvis.    --- End of Report ---            CAROL PIERCE MD; Attending Radiologist  This document has been electronically signed. Mar 16 2023  2:12PM    < end of copied text >    < from: CT Angio Neck Stroke Protocol w/ IV Cont (03.16.23 @ 14:08) >    IMPRESSION:  NO EVIDENCE OF AN ACUTE INTRACRANIAL HEMORRHAGE, MIDLINE SHIFT, OR   HYDROCEPHALUS.  NO HEMODYNAMIC SIGNIFICANT NARROWING WITHIN THE NECK.  NO PROXIMAL LARGE VESSEL OCCLUSION INTRACRANIALLY.  STABLE 1 TO 2 MM LEFT POSTERIOR COMMUNICATING AND LEFT MCA BIFURCATION   REGION ANEURYSMS.  SMALL REGIONS OF DECREASED PERFUSION/TISSUE AT RISK RIGHT TEMPORAL   OCCIPITAL REGION OF 11 ML. NO CORE INFARCT SEEN.    Correlate with MRI if symptomspersist    Preliminary head CT findings discussed with Dr. Parker at 1:58 PM on   3/16/2023. CTA and perfusion findings discussed at 2:16 PM    --- End of Report ---            SILVANO HEATON MD; Attending Radiologist  This document has been electronically signed. Mar 16 2023  2:25PM    < end of copied text >          EKG Personally reviewed available labs, imaging and ekg     Labs  CBC Full  -  ( 16 Mar 2023 13:50 )  WBC Count : 6.71 K/uL  RBC Count : 5.00 M/uL  Hemoglobin : 14.2 g/dL  Hematocrit : 45.3 %  Platelet Count - Automated : 295 K/uL  Mean Cell Volume : 90.6 fl  Mean Cell Hemoglobin : 28.4 pg  Mean Cell Hemoglobin Concentration : 31.3 gm/dL  Auto Neutrophil # : 4.38 K/uL  Auto Lymphocyte # : 1.39 K/uL  Auto Monocyte # : 0.83 K/uL  Auto Eosinophil # : 0.05 K/uL  Auto Basophil # : 0.02 K/uL  Auto Neutrophil % : 65.3 %  Auto Lymphocyte % : 20.7 %  Auto Monocyte % : 12.4 %  Auto Eosinophil % : 0.7 %  Auto Basophil % : 0.3 %    03-16    137  |  102  |  16.1  ----------------------------<  143<H>  3.7   |  22.0  |  0.80    Ca    9.2      16 Mar 2023 13:50    TPro  7.8  /  Alb  4.2  /  TBili  0.3<L>  /  DBili  x   /  AST  20  /  ALT  24  /  AlkPhos  115  03-16    PT/INR - ( 16 Mar 2023 13:50 )   PT: 13.2 sec;   INR: 1.14 ratio         PTT - ( 16 Mar 2023 13:50 )  PTT:29.6 sec    CAPILLARY BLOOD GLUCOSE        CARDIAC MARKERS ( 16 Mar 2023 13:50 )  x     / <0.01 ng/mL / x     / x     / x                Imaging  < from: CT Abdomen and Pelvis w/ IV Cont (03.16.23 @ 14:09) >    IMPRESSION: No evidence of acute inflammatory or obstructive process in   the abdomen and pelvis.    --- End of Report ---            CAROL PIERCE MD; Attending Radiologist  This document has been electronically signed. Mar 16 2023  2:12PM    < end of copied text >    < from: CT Angio Neck Stroke Protocol w/ IV Cont (03.16.23 @ 14:08) >    IMPRESSION:  NO EVIDENCE OF AN ACUTE INTRACRANIAL HEMORRHAGE, MIDLINE SHIFT, OR   HYDROCEPHALUS.  NO HEMODYNAMIC SIGNIFICANT NARROWING WITHIN THE NECK.  NO PROXIMAL LARGE VESSEL OCCLUSION INTRACRANIALLY.  STABLE 1 TO 2 MM LEFT POSTERIOR COMMUNICATING AND LEFT MCA BIFURCATION   REGION ANEURYSMS.  SMALL REGIONS OF DECREASED PERFUSION/TISSUE AT RISK RIGHT TEMPORAL   OCCIPITAL REGION OF 11 ML. NO CORE INFARCT SEEN.    Correlate with MRI if symptomspersist    Preliminary head CT findings discussed with Dr. Parker at 1:58 PM on   3/16/2023. CTA and perfusion findings discussed at 2:16 PM    --- End of Report ---            SILVANO HEATON MD; Attending Radiologist  This document has been electronically signed. Mar 16 2023  2:25PM    < end of copied text >          EKG- SInus tach 109 bpm. QTc- 457 ms.

## 2023-03-16 NOTE — ED ADULT NURSE NOTE - OBJECTIVE STATEMENT
Pt is here with c/o abd pain, N/V, diarrhea that started on Monday, states she is unable to keep anything down.  P/s the vomiting stopped but she is still having diarrhea.  P/s she woke up this morning and walked to the BR and noticed she was very dizzy and had blurry vision from 6am to noon.  Pt arrived after all was resolved but upon exam pt ambulated with an unsteady gait (shuffling).  Dr Gatica called a code stroke.

## 2023-03-17 DIAGNOSIS — R11.10 VOMITING, UNSPECIFIED: ICD-10-CM

## 2023-03-17 LAB
A1C WITH ESTIMATED AVERAGE GLUCOSE RESULT: 6.7 % — HIGH (ref 4–5.6)
ALBUMIN SERPL ELPH-MCNC: 3.6 G/DL — SIGNIFICANT CHANGE UP (ref 3.3–5.2)
ALP SERPL-CCNC: 102 U/L — SIGNIFICANT CHANGE UP (ref 40–120)
ALT FLD-CCNC: 22 U/L — SIGNIFICANT CHANGE UP
ANION GAP SERPL CALC-SCNC: 12 MMOL/L — SIGNIFICANT CHANGE UP (ref 5–17)
ANION GAP SERPL CALC-SCNC: 12 MMOL/L — SIGNIFICANT CHANGE UP (ref 5–17)
AST SERPL-CCNC: 19 U/L — SIGNIFICANT CHANGE UP
BACTERIA # UR AUTO: NEGATIVE — SIGNIFICANT CHANGE UP
BILIRUB SERPL-MCNC: 0.3 MG/DL — LOW (ref 0.4–2)
BUN SERPL-MCNC: 13.7 MG/DL — SIGNIFICANT CHANGE UP (ref 8–20)
BUN SERPL-MCNC: 14.3 MG/DL — SIGNIFICANT CHANGE UP (ref 8–20)
CALCIUM SERPL-MCNC: 8.5 MG/DL — SIGNIFICANT CHANGE UP (ref 8.4–10.5)
CALCIUM SERPL-MCNC: 8.6 MG/DL — SIGNIFICANT CHANGE UP (ref 8.4–10.5)
CHLORIDE SERPL-SCNC: 107 MMOL/L — SIGNIFICANT CHANGE UP (ref 96–108)
CHLORIDE SERPL-SCNC: 109 MMOL/L — HIGH (ref 96–108)
CHOLEST SERPL-MCNC: 94 MG/DL — SIGNIFICANT CHANGE UP
CO2 SERPL-SCNC: 18 MMOL/L — LOW (ref 22–29)
CO2 SERPL-SCNC: 19 MMOL/L — LOW (ref 22–29)
CREAT SERPL-MCNC: 0.55 MG/DL — SIGNIFICANT CHANGE UP (ref 0.5–1.3)
CREAT SERPL-MCNC: 0.79 MG/DL — SIGNIFICANT CHANGE UP (ref 0.5–1.3)
EGFR: 109 ML/MIN/1.73M2 — SIGNIFICANT CHANGE UP
EGFR: 89 ML/MIN/1.73M2 — SIGNIFICANT CHANGE UP
EPI CELLS # UR: SIGNIFICANT CHANGE UP
ESTIMATED AVERAGE GLUCOSE: 146 MG/DL — HIGH (ref 68–114)
GI PCR PANEL: DETECTED
GLUCOSE BLDC GLUCOMTR-MCNC: 130 MG/DL — HIGH (ref 70–99)
GLUCOSE BLDC GLUCOMTR-MCNC: 143 MG/DL — HIGH (ref 70–99)
GLUCOSE BLDC GLUCOMTR-MCNC: 148 MG/DL — HIGH (ref 70–99)
GLUCOSE BLDC GLUCOMTR-MCNC: 153 MG/DL — HIGH (ref 70–99)
GLUCOSE SERPL-MCNC: 128 MG/DL — HIGH (ref 70–99)
GLUCOSE SERPL-MCNC: 138 MG/DL — HIGH (ref 70–99)
HCT VFR BLD CALC: 41.4 % — SIGNIFICANT CHANGE UP (ref 34.5–45)
HDLC SERPL-MCNC: 24 MG/DL — LOW
HGB BLD-MCNC: 12.8 G/DL — SIGNIFICANT CHANGE UP (ref 11.5–15.5)
LIPID PNL WITH DIRECT LDL SERPL: 31 MG/DL — SIGNIFICANT CHANGE UP
MCHC RBC-ENTMCNC: 28.6 PG — SIGNIFICANT CHANGE UP (ref 27–34)
MCHC RBC-ENTMCNC: 30.9 GM/DL — LOW (ref 32–36)
MCV RBC AUTO: 92.6 FL — SIGNIFICANT CHANGE UP (ref 80–100)
NON HDL CHOLESTEROL: 70 MG/DL — SIGNIFICANT CHANGE UP
PLATELET # BLD AUTO: 249 K/UL — SIGNIFICANT CHANGE UP (ref 150–400)
POTASSIUM SERPL-MCNC: 3.6 MMOL/L — SIGNIFICANT CHANGE UP (ref 3.5–5.3)
POTASSIUM SERPL-MCNC: 4.1 MMOL/L — SIGNIFICANT CHANGE UP (ref 3.5–5.3)
POTASSIUM SERPL-SCNC: 3.6 MMOL/L — SIGNIFICANT CHANGE UP (ref 3.5–5.3)
POTASSIUM SERPL-SCNC: 4.1 MMOL/L — SIGNIFICANT CHANGE UP (ref 3.5–5.3)
PROT SERPL-MCNC: 7.1 G/DL — SIGNIFICANT CHANGE UP (ref 6.6–8.7)
RBC # BLD: 4.47 M/UL — SIGNIFICANT CHANGE UP (ref 3.8–5.2)
RBC # FLD: 12.8 % — SIGNIFICANT CHANGE UP (ref 10.3–14.5)
RBC CASTS # UR COMP ASSIST: SIGNIFICANT CHANGE UP /HPF (ref 0–4)
RV RNA STL NAA+PROBE: DETECTED
SODIUM SERPL-SCNC: 138 MMOL/L — SIGNIFICANT CHANGE UP (ref 135–145)
SODIUM SERPL-SCNC: 139 MMOL/L — SIGNIFICANT CHANGE UP (ref 135–145)
TRIGL SERPL-MCNC: 196 MG/DL — HIGH
WBC # BLD: 6.22 K/UL — SIGNIFICANT CHANGE UP (ref 3.8–10.5)
WBC # FLD AUTO: 6.22 K/UL — SIGNIFICANT CHANGE UP (ref 3.8–10.5)
WBC UR QL: SIGNIFICANT CHANGE UP /HPF (ref 0–5)

## 2023-03-17 PROCEDURE — 99233 SBSQ HOSP IP/OBS HIGH 50: CPT

## 2023-03-17 PROCEDURE — 99222 1ST HOSP IP/OBS MODERATE 55: CPT

## 2023-03-17 RX ORDER — INFLUENZA VIRUS VACCINE 15; 15; 15; 15 UG/.5ML; UG/.5ML; UG/.5ML; UG/.5ML
0.5 SUSPENSION INTRAMUSCULAR ONCE
Refills: 0 | Status: COMPLETED | OUTPATIENT
Start: 2023-03-17 | End: 2023-03-17

## 2023-03-17 RX ORDER — PANTOPRAZOLE SODIUM 20 MG/1
40 TABLET, DELAYED RELEASE ORAL ONCE
Refills: 0 | Status: COMPLETED | OUTPATIENT
Start: 2023-03-17 | End: 2023-03-17

## 2023-03-17 RX ORDER — PANTOPRAZOLE SODIUM 20 MG/1
40 TABLET, DELAYED RELEASE ORAL
Refills: 0 | Status: DISCONTINUED | OUTPATIENT
Start: 2023-03-17 | End: 2023-03-17

## 2023-03-17 RX ORDER — PANTOPRAZOLE SODIUM 20 MG/1
40 TABLET, DELAYED RELEASE ORAL
Refills: 0 | Status: DISCONTINUED | OUTPATIENT
Start: 2023-03-17 | End: 2023-03-18

## 2023-03-17 RX ADMIN — ATORVASTATIN CALCIUM 80 MILLIGRAM(S): 80 TABLET, FILM COATED ORAL at 21:57

## 2023-03-17 RX ADMIN — Medication 75 MILLIGRAM(S): at 12:28

## 2023-03-17 RX ADMIN — Medication 20 MILLIGRAM(S): at 16:42

## 2023-03-17 RX ADMIN — CLOPIDOGREL BISULFATE 75 MILLIGRAM(S): 75 TABLET, FILM COATED ORAL at 12:28

## 2023-03-17 RX ADMIN — PANTOPRAZOLE SODIUM 40 MILLIGRAM(S): 20 TABLET, DELAYED RELEASE ORAL at 12:28

## 2023-03-17 RX ADMIN — Medication 20 MILLIGRAM(S): at 05:23

## 2023-03-17 RX ADMIN — HEPARIN SODIUM 5000 UNIT(S): 5000 INJECTION INTRAVENOUS; SUBCUTANEOUS at 21:58

## 2023-03-17 RX ADMIN — HEPARIN SODIUM 5000 UNIT(S): 5000 INJECTION INTRAVENOUS; SUBCUTANEOUS at 05:23

## 2023-03-17 RX ADMIN — PANTOPRAZOLE SODIUM 40 MILLIGRAM(S): 20 TABLET, DELAYED RELEASE ORAL at 05:23

## 2023-03-17 RX ADMIN — HEPARIN SODIUM 5000 UNIT(S): 5000 INJECTION INTRAVENOUS; SUBCUTANEOUS at 13:30

## 2023-03-17 RX ADMIN — PANTOPRAZOLE SODIUM 40 MILLIGRAM(S): 20 TABLET, DELAYED RELEASE ORAL at 17:28

## 2023-03-17 NOTE — PROGRESS NOTE ADULT - ASSESSMENT
INCOMPLETE  ASSESSMENT:  52 y/o F hx of TIA, PFO on echo, ILR, PCOM/L MCA aneurysms from prior imaging, HTN, DM presented w/ dizziness. CT head demonstrated no acute infarction or hemorrhage.  Patient was outside of Tenecteplase window.  CT angiogram without large vessel occlusion therefor no thrombectomy.  Incidental 1-2mm left PCOMM and left MCA bifurcation aneurysms which should be followed closely.  Rule out cerebral infarction. Patient with diarrhea and vomiting. MRI with ..........    NEURO: Continue close monitoring for neurologic deterioration, sx control of symptoms, permissive HTN avoid rapid fluctuations and hypotension, titrate statin to LDL goal less than 70, MRI Brain w/o. Close outpatient monitory with Neuro IR (Dr. Oscar Hernández) at discharge,  Physical therapy/OT/Speech eval/treatment.     ANTITHROMBOTIC THERAPY: ASA 81mg daily if no contraindication     PULMONARY:  protecting airway, saturating well     CARDIOVASCULAR: check TTE, cardiac monitoring, ILR interrogation                              GASTROINTESTINAL:  dysphagia screen,  GI workup per medicine team, pt. with GERD  DIET: pureed, advance as tolerated    RENAL: BUN/Cr 13.7/0.55, maintain adequate hydration, good urine output      Na Goal: Greater than 135    HEMATOLOGY: H/H 12.8/41.4, within range, continue close monitoring , Platelets 295, age and risk appropriate malignancy screenings      DVT ppx: no neurological contraindication to pharmacological dvt ppx     ENDOCR:  A1C=6.7 , patient on Metformine      ID: afebrile, no leukocytosis , infectious workup as indicated     OTHER: cocain positive (educate on importance of abstinence to prevent further strokes)    DISPOSITION: Rehab or home depending on PT eval once stable and workup is complete      CORE MEASURES:        Admission NIHSS: 3     Tenecteplase : [] YES [x] NO      LDL/HDL/A1C:    /    /6.7     Depression Screen: p     Statin Therapy: Atorvastatin 80 mg      Dysphagia Screen: [x] PASS [] FAIL     Smoking [x] YES [] NO      Afib [] YES [x] NO     Stroke Education [x] YES [] NO       ASSESSMENT:  54 y/o F hx of TIA, PFO on echo, ILR, PCOM/L MCA aneurysms from prior imaging, HTN, DM presented w/ dizziness. CT head demonstrated no acute infarction or hemorrhage.  Patient was outside of Tenecteplase window.  CT angiogram without large vessel occlusion therefor no thrombectomy.  Incidental 1-2mm left PCOMM and left MCA bifurcation aneurysms which should be followed closely. MRI without evidence of acute infarction.  Symptoms likely due to vertigo but can not exclude cocaine induced vasospasm and possible TIA.     NEURO: Continue close monitoring for neurologic deterioration, sx control of symptoms, permissive HTN avoid rapid fluctuations and hypotension, titrate statin to LDL goal less than 70, MRI Brain as noted,  Close outpatient monitoring with Neuro IR (Dr. Oscar Hernández) at discharge given incidental aneurysms,  Physical therapy/OT/Speech eval/treatment.     ANTITHROMBOTIC THERAPY: ASA 81mg daily if no contraindication     PULMONARY:  protecting airway, saturating well     CARDIOVASCULAR: check TTE, cardiac monitoring, ILR interrogation                              GASTROINTESTINAL:  dysphagia screen complete,  GI workup per medicine team, pt. with GERD  DIET: DASH diet    RENAL: BUN/Cr 13.7/0.55, maintain adequate hydration, good urine output      Na Goal: Greater than 135    HEMATOLOGY: H/H 12.8/41.4, within range, continue close monitoring , Platelets 295, age and risk appropriate malignancy screenings      DVT ppx: no neurological contraindication to pharmacological dvt ppx     ENDOCR:  A1C=6.7 , consult endocrinology      ID: afebrile, no leukocytosis , infectious workup as indicated     OTHER: cocain positive (educated on importance of abstinence to prevent further strokes)    DISPOSITION: Rehab or home depending on PT eval once stable and workup is complete      CORE MEASURES:        Admission NIHSS: 3     Tenecteplase : [] YES [x] NO      LDL/HDL/A1C:    /    /6.7     Depression Screen: p     Statin Therapy: Atorvastatin 80 mg      Dysphagia Screen: [x] PASS [] FAIL     Smoking [x] YES [] NO      Afib [] YES [x] NO     Stroke Education [x] YES [] NO

## 2023-03-17 NOTE — PHYSICAL THERAPY INITIAL EVALUATION ADULT - ADDITIONAL COMMENTS
Pt AxOx4 states she lives in basement apartment with 6STE 2HR, none inside. Pt was independent without owning or using device. Lives with  and daughters/ grandkids come over frequently.

## 2023-03-17 NOTE — PROGRESS NOTE ADULT - SUBJECTIVE AND OBJECTIVE BOX
[facility] Division of Hospital Medicine    Chief Complaint: dizziness    SUBJECTIVE / OVERNIGHT EVENTS: Patient continues to endorse multiple episodes of loose diarrhea. Denies abdominal pain, n/v/f/c/h/d.     Patient denies chest pain, SOB, abd pain, N/V, fever, chills, dysuria or any other complaints. All remainder ROS negative.     MEDICATIONS  (STANDING):  atorvastatin 80 milliGRAM(s) Oral at bedtime  clopidogrel Tablet 75 milliGRAM(s) Oral daily  dextrose 5%. 1000 milliLiter(s) (50 mL/Hr) IV Continuous <Continuous>  dextrose 5%. 1000 milliLiter(s) (100 mL/Hr) IV Continuous <Continuous>  dextrose 50% Injectable 25 Gram(s) IV Push once  dextrose 50% Injectable 12.5 Gram(s) IV Push once  dextrose 50% Injectable 25 Gram(s) IV Push once  dicyclomine 20 milliGRAM(s) Oral two times a day before meals  glucagon  Injectable 1 milliGRAM(s) IntraMuscular once  heparin   Injectable 5000 Unit(s) SubCutaneous every 8 hours  influenza   Vaccine 0.5 milliLiter(s) IntraMuscular once  insulin lispro (ADMELOG) corrective regimen sliding scale   SubCutaneous three times a day before meals  insulin lispro (ADMELOG) corrective regimen sliding scale   SubCutaneous at bedtime  pantoprazole    Tablet 40 milliGRAM(s) Oral before breakfast  venlafaxine 75 milliGRAM(s) Oral daily    MEDICATIONS  (PRN):  dextrose Oral Gel 15 Gram(s) Oral once PRN Blood Glucose LESS THAN 70 milliGRAM(s)/deciliter  ondansetron Injectable 4 milliGRAM(s) IV Push every 6 hours PRN Nausea and/or Vomiting        I&O's Summary    16 Mar 2023 07:01  -  17 Mar 2023 07:00  --------------------------------------------------------  IN: 600 mL / OUT: 3 mL / NET: 597 mL        PHYSICAL EXAM:  Vital Signs Last 24 Hrs  T(C): 36.7 (17 Mar 2023 07:59), Max: 36.9 (17 Mar 2023 04:02)  T(F): 98.1 (17 Mar 2023 07:59), Max: 98.4 (17 Mar 2023 04:02)  HR: 102 (17 Mar 2023 10:00) (87 - 115)  BP: 104/74 (17 Mar 2023 10:00) (102/69 - 136/90)  BP(mean): 82 (17 Mar 2023 10:00) (82 - 101)  RR: 16 (17 Mar 2023 10:00) (16 - 20)  SpO2: 96% (17 Mar 2023 10:00) (92% - 100%)    Parameters below as of 17 Mar 2023 10:00  Patient On (Oxygen Delivery Method): room air      Constitutional: NAD, awake and alert  HEENT- LT-sided facial droop  Neck: Soft and supple, No LAD, No JVD  Respiratory: cta b/l no wheezing no rhonchi  Cardiovascular: +s1/s2 no edema b/l le  Gastrointestinal: mild tenderness to palpation, nd bs+  Vascular: 2+ peripheral pulses  Neurological: A/O x 3, following commands  Musculoskeletal: 5/5 strength b/l upper and lower extremities    LABS:                        12.8   6.22  )-----------( 249      ( 17 Mar 2023 06:07 )             41.4     03-17    139  |  109<H>  |  13.7  ----------------------------<  128<H>  3.6   |  18.0<L>  |  0.55    Ca    8.5      17 Mar 2023 06:07  Mg     2.1     03-16    TPro  7.1  /  Alb  3.6  /  TBili  0.3<L>  /  DBili  x   /  AST  19  /  ALT  22  /  AlkPhos  102  03-17    PT/INR - ( 16 Mar 2023 13:50 )   PT: 13.2 sec;   INR: 1.14 ratio         PTT - ( 16 Mar 2023 13:50 )  PTT:29.6 sec  CARDIAC MARKERS ( 16 Mar 2023 13:50 )  x     / <0.01 ng/mL / x     / x     / x          Urinalysis Basic - ( 16 Mar 2023 22:30 )    Color: Yellow / Appearance: Clear / S.020 / pH: x  Gluc: x / Ketone: Trace  / Bili: Negative / Urobili: Negative mg/dL   Blood: x / Protein: 30 mg/dL / Nitrite: Negative   Leuk Esterase: Negative / RBC: 0-2 /HPF / WBC 0-2 /HPF   Sq Epi: x / Non Sq Epi: Few / Bacteria: Negative        CAPILLARY BLOOD GLUCOSE      POCT Blood Glucose.: 143 mg/dL (17 Mar 2023 09:21)  POCT Blood Glucose.: 145 mg/dL (16 Mar 2023 22:12)        RADIOLOGY & ADDITIONAL TESTS:  Results Reviewed:   Imaging Personally Reviewed:  < from: MR Head No Cont (23 @ 21:57) >    IMPRESSION: No acute infarction.    --- End of Report ---            NIKO GOLDSMITH MD; Attending Radiologist  This document has been electronically signed. Mar 17 2023  8:37AM    < end of copied text >      Electrocardiogram Personally Reviewed:

## 2023-03-17 NOTE — OCCUPATIONAL THERAPY INITIAL EVALUATION ADULT - GENERAL OBSERVATIONS, REHAB EVAL
Received pt OOB in recliner, NAD, +IV lock, +Tele//BP on RA A&Ox4. Pre/post pain 0/10. Pt agreeable to OT evaluation Albendazole Counseling:  I discussed with the patient the risks of albendazole including but not limited to cytopenia, kidney damage, nausea/vomiting and severe allergy.  The patient understands that this medication is being used in an off-label manner.

## 2023-03-17 NOTE — SWALLOW BEDSIDE ASSESSMENT ADULT - SWALLOW EVAL: DIAGNOSIS
Oral & pharyngeal stage of swallow clinically unremarkable across administered PO trials with NO overt s/s aspiration noted post intake

## 2023-03-17 NOTE — SWALLOW BEDSIDE ASSESSMENT ADULT - COMMENTS
As per MD note: "53 year old Female with hx of TIA, PFO, PCOM/LT MCA aneurysm, HTN, T2DM who presents with dizziness, blurred vision and difficulty ambulating to the bathroom. Pt with incidental L Pcomm and L MCA bifurcation anuerysms, chronic R small cerebellar hemisphere could infarct. Positive cocaine"

## 2023-03-17 NOTE — PATIENT PROFILE ADULT - FUNCTIONAL ASSESSMENT - BASIC MOBILITY 6.
4 = No assist / stand by assistance HU, MIHYEON ; 10/05/2021 ; [facility] [address] HU, MIHYEON ; 10/05/2021 ; [facility] [address]

## 2023-03-17 NOTE — PROGRESS NOTE ADULT - SUBJECTIVE AND OBJECTIVE BOX
INCOMPLETE Preliminary note, offical recommendations pending attending review/signature                                                      [facility] Stroke Team                                                                     Progress Note     CC: Dizziness   HPI:54 y/o F hx of TIA, PFO on echo, ILR, PCOM/L MCA aneurysms from prior imaging, HTN, DM presented w/ dizziness. last known well 8PM 1 day prior (>12 hours prior to presentation). Woke up at 6AM day of admission w/ blurry vision and difficulty ambulating. Noted that symptoms were present from 6 AM to 12 PM day of admission before resolving. Endorsing dizziness that is worse w/ movement/changing positions. endorsing nausea/vomiting as well, 3-4 episodes  which were non-bloody in nature. Endorses headache diffuse in nature, described as a "buzzing sensation." Endorsing diarrhea.     SUBJECTIVE: No events overnight.  No new neurologic complaints.  ROS reported negative unless otherwise noted.    PAST MEDICAL & SURGICAL HISTORY:  HTN (hypertension)  HLD (hyperlipidemia)  Diabetes  H/O:     atorvastatin 80 milliGRAM(s) Oral at bedtime  clopidogrel Tablet 75 milliGRAM(s) Oral daily  dextrose 5%. 1000 milliLiter(s) IV Continuous <Continuous>  dextrose 5%. 1000 milliLiter(s) IV Continuous <Continuous>  dextrose 50% Injectable 25 Gram(s) IV Push once  dextrose 50% Injectable 12.5 Gram(s) IV Push once  dextrose 50% Injectable 25 Gram(s) IV Push once  dextrose Oral Gel 15 Gram(s) Oral once PRN  dicyclomine 20 milliGRAM(s) Oral two times a day before meals  glucagon  Injectable 1 milliGRAM(s) IntraMuscular once  heparin   Injectable 5000 Unit(s) SubCutaneous every 8 hours  influenza   Vaccine 0.5 milliLiter(s) IntraMuscular once  insulin lispro (ADMELOG) corrective regimen sliding scale   SubCutaneous three times a day before meals  insulin lispro (ADMELOG) corrective regimen sliding scale   SubCutaneous at bedtime  lisinopril 5 milliGRAM(s) Oral daily  ondansetron Injectable 4 milliGRAM(s) IV Push every 6 hours PRN  pantoprazole    Tablet 40 milliGRAM(s) Oral before breakfast  venlafaxine 75 milliGRAM(s) Oral daily      PHYSICAL EXAM:   Vital Signs Last 24 Hrs  T(C): 36.9 (17 Mar 2023 04:02), Max: 36.9 (17 Mar 2023 04:02)  T(F): 98.4 (17 Mar 2023 04:02), Max: 98.4 (17 Mar 2023 04:02)  HR: 93 (17 Mar 2023 06:00) (93 - 115)  BP: 135/91 (17 Mar 2023 06:00) (102/69 - 136/90)  BP(mean): 101 (17 Mar 2023 06:00) (85 - 101)  RR: 16 (17 Mar 2023 06:00) (16 - 20)  SpO2: 92% (17 Mar 2023 06:00) (92% - 100%)    Parameters below as of 17 Mar 2023 06:00  Patient On (Oxygen Delivery Method): nasal cannula  O2 Flow (L/min): 2    INCOMPLETE  General: No acute distress    NEUROLOGICAL EXAM:  Mental status: The patient is awake and alert and has normal attention span.  The patient is fully oriented in 3 spheres. The patient is oriented to current events. The patient is able to name objects, follow commands, repeat sentences.    Cranial nerves: Pupils equal and react symmetrically to light. There is no visual field deficit to confrontation. Extraocular motion is full with no nystagmus. There is no ptosis. Facial sensation is intact. Facial musculature is symmetric. Palate elevates symmetrically. Tongue is midline.    Motor: There is normal bulk and tone.  There is no tremor.  Strength is 5/5 in the right arm and leg.   Strength is 5/5 in the left arm and leg.    Sensation: Intact to light touch, but reports decreased mildly on left arm and leg     Reflexes: 1+ throughout and plantar responses are flexor.    Cerebellar: There is no dysmetria on finger to nose testing.    Gait : deferred      LABS:                        12.8   6.22  )-----------( 249      ( 17 Mar 2023 06:07 )             41.4    03-17    139  |  109<H>  |  13.7  ----------------------------<  128<H>  3.6   |  18.0<L>  |  0.55    Ca    8.5      17 Mar 2023 06:07  Mg     2.1     03-16    TPro  7.1  /  Alb  3.6  /  TBili  0.3<L>  /  DBili  x   /  AST  19  /  ALT  22  /  AlkPhos  102  03-17  PT/INR - ( 16 Mar 2023 13:50 )   PT: 13.2 sec;   INR: 1.14 ratio         PTT - ( 16 Mar 2023 13:50 )  PTT:29.6 sec      IMAGING: Reviewed by me.   3/16/23 MRI complete            CT Brain/CT Angio Brain/Neck  Stroke Protocol  w/ IV Cont (23 @ 14:08)   IMPRESSION:  NO EVIDENCE OF AN ACUTE INTRACRANIAL HEMORRHAGE, MIDLINE SHIFT, OR   HYDROCEPHALUS.  NO HEMODYNAMIC SIGNIFICANT NARROWING WITHIN THE NECK.  NO PROXIMAL LARGE VESSEL OCCLUSION INTRACRANIALLY.  STABLE 1 TO 2 MM LEFT POSTERIOR COMMUNICATING AND LEFT MCA BIFURCATION   REGION ANEURYSMS.  SMALL REGIONS OF DECREASED PERFUSION/TISSUE AT RISK RIGHT TEMPORAL   OCCIPITAL REGION OF 11 ML. NO CORE INFARCT SEEN.      Amharic Translation services used # [id number]                                                                                                                                                                           Preliminary note, official recommendations pending attending review/signature                                                      [facility] Stroke Team                                                                     Progress Note     CC: Dizziness   HPI:52 y/o F hx of TIA, PFO on echo, ILR, PCOM/L MCA aneurysms from prior imaging, HTN, DM presented w/ dizziness. last known well 8PM 1 day prior (>12 hours prior to presentation). Woke up at 6AM day of admission w/ blurry vision and difficulty ambulating. Noted that symptoms were present from 6 AM to 12 PM day of admission before resolving. Endorsing dizziness that is worse w/ movement/changing positions. endorsing nausea/vomiting as well, 3-4 episodes  which were non-bloody in nature. Endorses headache diffuse in nature, described as a "buzzing sensation." Endorsing diarrhea.     SUBJECTIVE: No events overnight.  No new neurologic complaints.  ROS reported negative unless otherwise noted.    PAST MEDICAL & SURGICAL HISTORY:  HTN (hypertension)  HLD (hyperlipidemia)  Diabetes  H/O:     atorvastatin 80 milliGRAM(s) Oral at bedtime  clopidogrel Tablet 75 milliGRAM(s) Oral daily  dextrose 5%. 1000 milliLiter(s) IV Continuous <Continuous>  dextrose 5%. 1000 milliLiter(s) IV Continuous <Continuous>  dextrose 50% Injectable 25 Gram(s) IV Push once  dextrose 50% Injectable 12.5 Gram(s) IV Push once  dextrose 50% Injectable 25 Gram(s) IV Push once  dextrose Oral Gel 15 Gram(s) Oral once PRN  dicyclomine 20 milliGRAM(s) Oral two times a day before meals  glucagon  Injectable 1 milliGRAM(s) IntraMuscular once  heparin   Injectable 5000 Unit(s) SubCutaneous every 8 hours  influenza   Vaccine 0.5 milliLiter(s) IntraMuscular once  insulin lispro (ADMELOG) corrective regimen sliding scale   SubCutaneous three times a day before meals  insulin lispro (ADMELOG) corrective regimen sliding scale   SubCutaneous at bedtime  lisinopril 5 milliGRAM(s) Oral daily  ondansetron Injectable 4 milliGRAM(s) IV Push every 6 hours PRN  pantoprazole    Tablet 40 milliGRAM(s) Oral before breakfast  venlafaxine 75 milliGRAM(s) Oral daily      PHYSICAL EXAM:   Vital Signs Last 24 Hrs  T(C): 36.9 (17 Mar 2023 04:02), Max: 36.9 (17 Mar 2023 04:02)  T(F): 98.4 (17 Mar 2023 04:02), Max: 98.4 (17 Mar 2023 04:02)  HR: 93 (17 Mar 2023 06:00) (93 - 115)  BP: 135/91 (17 Mar 2023 06:00) (102/69 - 136/90)  BP(mean): 101 (17 Mar 2023 06:00) (85 - 101)  RR: 16 (17 Mar 2023 06:00) (16 - 20)  SpO2: 92% (17 Mar 2023 06:00) (92% - 100%)    Parameters below as of 17 Mar 2023 06:00  Patient On (Oxygen Delivery Method): nasal cannula  O2 Flow (L/min): 2    General: No acute distress    NEUROLOGICAL EXAM:  Mental status: The patient is awake and alert and has normal attention span.  The patient is fully oriented in 3 spheres. The patient is oriented to current events. The patient is able to name objects, follow commands, repeat sentences.    Cranial nerves: Pupils equal and react symmetrically to light. There is no visual field deficit to confrontation. Extraocular motion is full with no nystagmus. There is no ptosis. Facial sensation is intact. Facial musculature is symmetric. Palate elevates symmetrically. Tongue is midline.    Motor: There is normal bulk and tone.  There is no tremor.  Strength is 5/5 in the right arm and leg.   Strength is 5/5 in the left arm and leg.    Sensation: Intact to light touch    Reflexes: 2+ throughout and plantar responses are flexor.    Cerebellar: There is no dysmetria on finger to nose testing.    Gait : deferred      LABS:                        12.8   6.22  )-----------( 249      ( 17 Mar 2023 06:07 )             41.4    03-17    139  |  109<H>  |  13.7  ----------------------------<  128<H>  3.6   |  18.0<L>  |  0.55    Ca    8.5      17 Mar 2023 06:07  Mg     2.1     03-16    TPro  7.1  /  Alb  3.6  /  TBili  0.3<L>  /  DBili  x   /  AST  19  /  ALT  22  /  AlkPhos  102  03-17  PT/INR - ( 16 Mar 2023 13:50 )   PT: 13.2 sec;   INR: 1.14 ratio         PTT - ( 16 Mar 2023 13:50 )  PTT:29.6 sec      IMAGING: Reviewed by me.     CT Angio Neck Stroke Protocol w/ IV Cont (23 @ 14:08)   IMPRESSION:  NO EVIDENCE OF AN ACUTE INTRACRANIAL HEMORRHAGE, MIDLINE SHIFT, OR   HYDROCEPHALUS.  NO HEMODYNAMIC SIGNIFICANT NARROWING WITHIN THE NECK.  NO PROXIMAL LARGE VESSEL OCCLUSION INTRACRANIALLY.  STABLE 1 TO 2 MM LEFT POSTERIOR COMMUNICATING AND LEFT MCA BIFURCATION   REGION ANEURYSMS.  SMALL REGIONS OF DECREASED PERFUSION/TISSUE AT RISK RIGHT TEMPORAL   OCCIPITAL REGION OF 11 ML. NO CORE INFARCT SEEN.    MR Head No Cont (23 @ 21:57)   IMPRESSION: No acute infarction.                CT Brain/CT Angio Brain/Neck  Stroke Protocol  w/ IV Cont (23 @ 14:08)   IMPRESSION:  NO EVIDENCE OF AN ACUTE INTRACRANIAL HEMORRHAGE, MIDLINE SHIFT, OR   HYDROCEPHALUS.  NO HEMODYNAMIC SIGNIFICANT NARROWING WITHIN THE NECK.  NO PROXIMAL LARGE VESSEL OCCLUSION INTRACRANIALLY.  STABLE 1 TO 2 MM LEFT POSTERIOR COMMUNICATING AND LEFT MCA BIFURCATION   REGION ANEURYSMS.  SMALL REGIONS OF DECREASED PERFUSION/TISSUE AT RISK RIGHT TEMPORAL   OCCIPITAL REGION OF 11 ML. NO CORE INFARCT SEEN.      [place]ian Translation services used # [id number]                                                                                                                                                                                                                              [facility] Stroke Team                                                                   Progress Note     CC: Dizziness   HPI:54 y/o F hx of TIA, PFO on echo, ILR, PCOM/L MCA aneurysms from prior imaging, HTN, DM presented w/ dizziness. last known well 8PM 1 day prior (>12 hours prior to presentation). Woke up at 6AM day of admission w/ blurry vision and difficulty ambulating. Noted that symptoms were present from 6 AM to 12 PM day of admission before resolving. Endorsing dizziness that is worse w/ movement/changing positions. endorsing nausea/vomiting as well, 3-4 episodes  which were non-bloody in nature. Endorses headache diffuse in nature, described as a "buzzing sensation." Endorsing diarrhea.     SUBJECTIVE: No events overnight.  No new neurologic complaints.  ROS reported negative unless otherwise noted.    PAST MEDICAL & SURGICAL HISTORY:  HTN (hypertension)  HLD (hyperlipidemia)  Diabetes  H/O:     atorvastatin 80 milliGRAM(s) Oral at bedtime  clopidogrel Tablet 75 milliGRAM(s) Oral daily  dextrose 5%. 1000 milliLiter(s) IV Continuous <Continuous>  dextrose 5%. 1000 milliLiter(s) IV Continuous <Continuous>  dextrose 50% Injectable 25 Gram(s) IV Push once  dextrose 50% Injectable 12.5 Gram(s) IV Push once  dextrose 50% Injectable 25 Gram(s) IV Push once  dextrose Oral Gel 15 Gram(s) Oral once PRN  dicyclomine 20 milliGRAM(s) Oral two times a day before meals  glucagon  Injectable 1 milliGRAM(s) IntraMuscular once  heparin   Injectable 5000 Unit(s) SubCutaneous every 8 hours  influenza   Vaccine 0.5 milliLiter(s) IntraMuscular once  insulin lispro (ADMELOG) corrective regimen sliding scale   SubCutaneous three times a day before meals  insulin lispro (ADMELOG) corrective regimen sliding scale   SubCutaneous at bedtime  lisinopril 5 milliGRAM(s) Oral daily  ondansetron Injectable 4 milliGRAM(s) IV Push every 6 hours PRN  pantoprazole    Tablet 40 milliGRAM(s) Oral before breakfast  venlafaxine 75 milliGRAM(s) Oral daily      PHYSICAL EXAM:   Vital Signs Last 24 Hrs  T(C): 36.9 (17 Mar 2023 04:02), Max: 36.9 (17 Mar 2023 04:02)  T(F): 98.4 (17 Mar 2023 04:02), Max: 98.4 (17 Mar 2023 04:02)  HR: 93 (17 Mar 2023 06:00) (93 - 115)  BP: 135/91 (17 Mar 2023 06:00) (102/69 - 136/90)  BP(mean): 101 (17 Mar 2023 06:00) (85 - 101)  RR: 16 (17 Mar 2023 06:00) (16 - 20)  SpO2: 92% (17 Mar 2023 06:00) (92% - 100%)    Parameters below as of 17 Mar 2023 06:00  Patient On (Oxygen Delivery Method): nasal cannula  O2 Flow (L/min): 2    General: No acute distress    NEUROLOGICAL EXAM:  Mental status: The patient is awake and alert and has normal attention span.  The patient is fully oriented in 3 spheres. The patient is oriented to current events. The patient is able to name objects, follow commands, repeat sentences.    Cranial nerves: Pupils equal and react symmetrically to light. There is no visual field deficit to confrontation. Extraocular motion is full with no nystagmus. There is no ptosis. Facial sensation is intact. Facial musculature is symmetric. Palate elevates symmetrically. Tongue is midline.    Motor: There is normal bulk and tone.  There is no tremor.  Strength is 5/5 in the right arm and leg.   Strength is 5/5 in the left arm and leg.    Sensation: Intact to light touch    Reflexes: 2+ throughout and plantar responses are flexor.    Cerebellar: There is no dysmetria on finger to nose testing.    Gait : deferred      LABS:                        12.8   6.22  )-----------( 249      ( 17 Mar 2023 06:07 )             41.4    03-17    139  |  109<H>  |  13.7  ----------------------------<  128<H>  3.6   |  18.0<L>  |  0.55    Ca    8.5      17 Mar 2023 06:07  Mg     2.1     03-16    TPro  7.1  /  Alb  3.6  /  TBili  0.3<L>  /  DBili  x   /  AST  19  /  ALT  22  /  AlkPhos  102  03-17  PT/INR - ( 16 Mar 2023 13:50 )   PT: 13.2 sec;   INR: 1.14 ratio         PTT - ( 16 Mar 2023 13:50 )  PTT:29.6 sec      IMAGING: Reviewed by me.     CT Angio Neck Stroke Protocol w/ IV Cont (23 @ 14:08)   IMPRESSION:  NO EVIDENCE OF AN ACUTE INTRACRANIAL HEMORRHAGE, MIDLINE SHIFT, OR   HYDROCEPHALUS.  NO HEMODYNAMIC SIGNIFICANT NARROWING WITHIN THE NECK.  NO PROXIMAL LARGE VESSEL OCCLUSION INTRACRANIALLY.  STABLE 1 TO 2 MM LEFT POSTERIOR COMMUNICATING AND LEFT MCA BIFURCATION   REGION ANEURYSMS.  SMALL REGIONS OF DECREASED PERFUSION/TISSUE AT RISK RIGHT TEMPORAL   OCCIPITAL REGION OF 11 ML. NO CORE INFARCT SEEN.    MR Head No Cont (23 @ 21:57)   IMPRESSION: No acute infarction.    CT Brain/CT Angio Brain/Neck  Stroke Protocol  w/ IV Cont (23 @ 14:08)   IMPRESSION:  NO EVIDENCE OF AN ACUTE INTRACRANIAL HEMORRHAGE, MIDLINE SHIFT, OR   HYDROCEPHALUS.  NO HEMODYNAMIC SIGNIFICANT NARROWING WITHIN THE NECK.  NO PROXIMAL LARGE VESSEL OCCLUSION INTRACRANIALLY.  STABLE 1 TO 2 MM LEFT POSTERIOR COMMUNICATING AND LEFT MCA BIFURCATION   REGION ANEURYSMS.  SMALL REGIONS OF DECREASED PERFUSION/TISSUE AT RISK RIGHT TEMPORAL   OCCIPITAL REGION OF 11 ML. NO CORE INFARCT SEEN.

## 2023-03-17 NOTE — OCCUPATIONAL THERAPY INITIAL EVALUATION ADULT - DIAGNOSIS, OT EVAL
53 year old Female with hx of TIA, PFO, PCOM/LT MCA aneurysm, HTN, T2DM who presents with dizziness, blurred vision and difficulty ambulating to the bathroom. Pt with incidental L Pcomm and L MCA bifurcation anuerysms, chronic R small cerebellar hemisphere could infarct. Positive cocaine

## 2023-03-17 NOTE — CONSULT NOTE ADULT - PROBLEM SELECTOR RECOMMENDATION 9
CT A/P - stable trace stranding of the mesentery with shotty perivascular lymph nodes compatible with mesenteric panniculitis, no evidence of acute inflammatory or obstructive process in the abd/pelvis.   + sick contact with granddaughter     - PPI BID, Pepcid PRN, antiemetics PRN   - Low fiber, low lactose diet as tolerated   - Stool studies are pending (GI PCR, O&P, stool culture)  - Stool count   - Outpatient follow up with her gastroenterologist for EGD and age appropriate screening colonoscopy   _________________________________________________________________  Assessment and recommendations are final when note is signed by the attending physician. CT A/P - stable trace stranding of the mesentery with shotty perivascular lymph nodes compatible with mesenteric panniculitis, no evidence of acute inflammatory or obstructive process in the abd/pelvis.   + sick contact with granddaughter     - PPI BID, Pepcid PRN, antiemetics PRN   - Low fiber, low lactose diet as tolerated   - Stool studies are pending (GI PCR, O&P, stool culture)  - Stool count   - Outpatient follow up with her gastroenterologist for EGD and age appropriate screening colonoscopy

## 2023-03-17 NOTE — SWALLOW BEDSIDE ASSESSMENT ADULT - SLP GENERAL OBSERVATIONS
Pt received & seen seated upright OOB in chair, awake/alert, daughter present, language line ID: [id number] used for Ukrainian, 0/10 pain pre/post

## 2023-03-17 NOTE — PHYSICAL THERAPY INITIAL EVALUATION ADULT - GENERAL OBSERVATIONS, REHAB EVAL
Pt received in chair, pleasant and agreeable to PT Urdu speaking only- PT conversed in native language.

## 2023-03-17 NOTE — PROGRESS NOTE ADULT - ASSESSMENT
Patient is a 53F w/ hx of TIA, PFO, PCOM/LT MCA aneurysm, HTN, T2DM who presents with dizziness.    #TIA/CVA r/o  - Likely vertigo in the setting of cocaine use  - w/ dizziness, blurred vision and difficulty ambulating. NIHSS- 3  - outside tPA window  - will c/w Plavix and lipitor  - neuro recs appreciated  - F/u Utox- positive for cocaine  - Patient with incidental 1-2mm left PCOMM and left MCA bifurcation aneurysms. Will need o/p follow up with Dr. Hernández for aneurysms  - F/u MRI-H- Chronic small right cerebellar hemisphere could infarct. No acute infarction  - F/u PT/OT and S/S evaluation    #Moderate PFO  - ILR interrogated  - F/u TTE  - c/w medical management    #Nausea/Vomiting w/ diarrhea  - Will give maintenance IVF  - F/u CT A/P- No evidence of acute inflammatory or obstructive process in the abdomen and pelvis.  - zofran PRN  - GI c/s placed. Switched to IV Protonix  - F/u GI PCR and stool studies    #T2DM  - on metformin  - will c/w SSI    #HTN  - will hold lisinopril, allow for permissive HTN    DVT ppx- heparin ppx  Diet- Pureed diet. Will modify depending on S/S recs  Dispo- Pending clinical improvement. PT/OT and S/S evaluation    Med rec from [facility] from [place] ([phone]).

## 2023-03-17 NOTE — OCCUPATIONAL THERAPY INITIAL EVALUATION ADULT - ADDITIONAL COMMENTS
Pt has a tub with curtains and grab bars. Pt was independent without owning or using device. Pt is right handed and does not drive. Pt has a left hearing aide.
(0) understands/communicates without difficulty

## 2023-03-17 NOTE — PATIENT PROFILE ADULT - FALL HARM RISK - HARM RISK INTERVENTIONS

## 2023-03-17 NOTE — CONSULT NOTE ADULT - SUBJECTIVE AND OBJECTIVE BOX
Patient is a 53y old  Female who presents with a chief complaint of dizziness. (17 Mar 2023 11:09)      HPI:  53 F with PMHx of TIA, PFO, PCOM/LT MCA aneurysm, HTN, T2DM who presents with dizziness when walking to the bathroom.           PAST MEDICAL & SURGICAL HISTORY:  HTN (hypertension)      HLD (hyperlipidemia)      Diabetes      H/O:           Allergies    No Known Allergies    Intolerances        MEDICATIONS  (STANDING):  atorvastatin 80 milliGRAM(s) Oral at bedtime  clopidogrel Tablet 75 milliGRAM(s) Oral daily  dextrose 5%. 1000 milliLiter(s) (50 mL/Hr) IV Continuous <Continuous>  dextrose 5%. 1000 milliLiter(s) (100 mL/Hr) IV Continuous <Continuous>  dextrose 50% Injectable 25 Gram(s) IV Push once  dextrose 50% Injectable 12.5 Gram(s) IV Push once  dextrose 50% Injectable 25 Gram(s) IV Push once  dicyclomine 20 milliGRAM(s) Oral two times a day before meals  glucagon  Injectable 1 milliGRAM(s) IntraMuscular once  heparin   Injectable 5000 Unit(s) SubCutaneous every 8 hours  influenza   Vaccine 0.5 milliLiter(s) IntraMuscular once  insulin lispro (ADMELOG) corrective regimen sliding scale   SubCutaneous three times a day before meals  insulin lispro (ADMELOG) corrective regimen sliding scale   SubCutaneous at bedtime  venlafaxine 75 milliGRAM(s) Oral daily    MEDICATIONS  (PRN):  dextrose Oral Gel 15 Gram(s) Oral once PRN Blood Glucose LESS THAN 70 milliGRAM(s)/deciliter  ondansetron Injectable 4 milliGRAM(s) IV Push every 6 hours PRN Nausea and/or Vomiting      Social History:    Marital Status:  (   )    (   ) Single    (   )    (  )   Occupation:   Lives with: (  ) alone  (  ) children   (  ) spouse   (  ) parents  (  ) other    Substance Use (street drugs): (  ) never used  (  ) other:  Tobacco Usage:  (   ) never smoked   (   ) former smoker   ( x  ) current smoker  (     ) pack year  (        ) last cigarette date  Alcohol Usage: socially  Sexual History:     Family History   IBD (  ) Yes   (  ) No  GI Malignancy (  )  Yes    ( x ) No    Health Management     Last Colonoscopy - 8 years ago in [place], reports it was normal       (     ) Advanced Directives: (     ) None    (      ) DNR    (     ) DNI    (     ) Health Care Proxy:       REVIEW OF SYSTEMS:   General: Negative  HEENT: Negative  CV: Negative  Respiratory: Negative  GI: See HPI  : Negative  MSK: Negative  Hematologic: Negative  Skin: Negative      Vital Signs Last 24 Hrs  T(C): 36.7 (17 Mar 2023 12:25), Max: 36.9 (17 Mar 2023 04:02)  T(F): 98 (17 Mar 2023 12:25), Max: 98.4 (17 Mar 2023 04:02)  HR: 72 (17 Mar 2023 12:00) (72 - 115)  BP: 111/72 (17 Mar 2023 12:00) (104/74 - 136/90)  BP(mean): 81 (17 Mar 2023 12:00) (81 - 101)  RR: 17 (17 Mar 2023 12:00) (16 - 20)  SpO2: 95% (17 Mar 2023 12:00) (92% - 100%)    Parameters below as of 17 Mar 2023 12:00  Patient On (Oxygen Delivery Method): room air        PHYSICAL EXAM:   GENERAL:  No acute distress  HEENT:  NC/AT, conjunctiva clear, sclera anicteric  CHEST:  No increased effort, breath sounds clear  HEART:  Regular rate  ABDOMEN:  Soft, non-tender, non-distended, normoactive bowel sounds, no rebound or guarding  EXTREMITIES: No edema  SKIN:  Warm, dry  NEURO:  Calm, cooperative        LABS:                        12.8   6.22  )-----------( 249      ( 17 Mar 2023 06:07 )             41.4     03-17    139  |  109<H>  |  13.7  ----------------------------<  128<H>  3.6   |  18.0<L>  |  0.55    Ca    8.5      17 Mar 2023 06:07  Mg     2.1     03-16    TPro  7.1  /  Alb  3.6  /  TBili  0.3<L>  /  DBili  x   /  AST  19  /  ALT  22  /  AlkPhos  102  03-17    PT/INR - ( 16 Mar 2023 13:50 )   PT: 13.2 sec;   INR: 1.14 ratio         PTT - ( 16 Mar 2023 13:50 )  PTT:29.6 sec  Urinalysis Basic - ( 16 Mar 2023 22:30 )    Color: Yellow / Appearance: Clear / S.020 / pH: x  Gluc: x / Ketone: Trace  / Bili: Negative / Urobili: Negative mg/dL   Blood: x / Protein: 30 mg/dL / Nitrite: Negative   Leuk Esterase: Negative / RBC: 0-2 /HPF / WBC 0-2 /HPF   Sq Epi: x / Non Sq Epi: Few / Bacteria: Negative      LIVER FUNCTIONS - ( 17 Mar 2023 06:07 )  Alb: 3.6 g/dL / Pro: 7.1 g/dL / ALK PHOS: 102 U/L / ALT: 22 U/L / AST: 19 U/L / GGT: x             RADIOLOGY & ADDITIONAL TESTS:    < from: CT Abdomen and Pelvis w/ IV Cont (23 @ 14:09) >    ACC: [id number] EXAM:  CT ABDOMEN AND PELVIS IC   ORDERED BY: ANNA MANCINI     PROCEDURE DATE:  2023          INTERPRETATION:  CLINICAL INFORMATION: Right lower quadrant pain. Nausea   and vomiting.    COMPARISON: Noncontrast CT of the abdomen andpelvis 2021.    PROCEDURE:  CT of the Abdomen and Pelvis was performed oral contrast and without   intravenous contrast.  Sagittal and coronal reformats were performed.    FINDINGS:  LOWER CHEST: Within normal limits.    LIVER: Within normal limits.  BILE DUCTS: Normal caliber.  GALLBLADDER: Within normal limits.  SPLEEN: Within normal limits.  PANCREAS: Within normal limits.  ADRENALS: Within normal limits.  KIDNEYS/URETERS: Tiny left renal cyst. Otherwise, within normal limits.   Excreted contrast media in the bilateral renal collecting systems and   ureters related to CT angiogram of the head and neck performed previously.    BLADDER: Minimally distended.  REPRODUCTIVE ORGANS: Bilateral Essure devices. Otherwise, the uterus and   adnexa are unremarkable by CT criteria.    BOWEL: No bowel obstruction. Appendix is normal. Stable trace stranding   of the mesentery with shotty perivascular lymph nodes, compatible with   mesenteric panniculitis.  PERITONEUM: No ascites.  VESSELS: Mild atherosclerotic disease of the proximal SMA. The abdominal   aorta is nonaneurysmal.  RETROPERITONEUM/LYMPH NODES: No lymphadenopathy. Stable shotty   para-aortic lymph nodes.  ABDOMINAL WALL: Within normal limits.  BONES: Within normal limits.    IMPRESSION: No evidence of acute inflammatory or obstructive process in   the abdomen and pelvis.    --- End of Report ---            CAROL PIERCE MD; Attending Radiologist  This document has been electronically signed. Mar 16 2023  2:12PM    < end of copied text >       Patient is a 53y old  Female who presents with a chief complaint of dizziness. (17 Mar 2023 11:09)      HPI:  53 F with PMHx of TIA, PFO, PCOM/LT MCA aneurysm, HTN, T2DM who presents with dizziness when walking to the bathroom, her last known well was 8pm on 3/15. She was admitted to the hospital for stroke workup. She initially reported lower abdominal discomfort and intermittent episodes of loose watery bowel movements as well. At present she does not have any more abdominal pain. She last had nausea and vomiting yesterday. Yesterday she reports multiple loose BMs but states she only had 1 BM this morning since 9pm last night. Her granddaughter at home was sick with similar symptoms. She denies recent travel, new foods or medications. Of note she has an upcoming EGD scheduled for 23 with [facility] for reflux symptoms. At home she takes 40mg of Pepcid and another medication for reflux she cannot remember. She reports hx of colonoscopy about 8 years ago in [place].     CT A/P shows stable trace stranding of the mesentery with shotty perivascular lymph nodes compatible with mesenteric panniculitis, no evidence of acute inflammatory or obstructive process in the abd/pelvis. Her vitals are stable, afebrile, no leukocytosis. Stool studies are pending.     ED  Quang Torres utilized for translation        PAST MEDICAL & SURGICAL HISTORY:  HTN (hypertension)      HLD (hyperlipidemia)      Diabetes      H/O:           Allergies    No Known Allergies    Intolerances        MEDICATIONS  (STANDING):  atorvastatin 80 milliGRAM(s) Oral at bedtime  clopidogrel Tablet 75 milliGRAM(s) Oral daily  dextrose 5%. 1000 milliLiter(s) (50 mL/Hr) IV Continuous <Continuous>  dextrose 5%. 1000 milliLiter(s) (100 mL/Hr) IV Continuous <Continuous>  dextrose 50% Injectable 25 Gram(s) IV Push once  dextrose 50% Injectable 12.5 Gram(s) IV Push once  dextrose 50% Injectable 25 Gram(s) IV Push once  dicyclomine 20 milliGRAM(s) Oral two times a day before meals  glucagon  Injectable 1 milliGRAM(s) IntraMuscular once  heparin   Injectable 5000 Unit(s) SubCutaneous every 8 hours  influenza   Vaccine 0.5 milliLiter(s) IntraMuscular once  insulin lispro (ADMELOG) corrective regimen sliding scale   SubCutaneous three times a day before meals  insulin lispro (ADMELOG) corrective regimen sliding scale   SubCutaneous at bedtime  venlafaxine 75 milliGRAM(s) Oral daily    MEDICATIONS  (PRN):  dextrose Oral Gel 15 Gram(s) Oral once PRN Blood Glucose LESS THAN 70 milliGRAM(s)/deciliter  ondansetron Injectable 4 milliGRAM(s) IV Push every 6 hours PRN Nausea and/or Vomiting      Social History:    Marital Status:  (   )    (   ) Single    (   )    (  )   Occupation:   Lives with: (  ) alone  (  ) children   (  ) spouse   (  ) parents  (  ) other    Substance Use (street drugs): (  ) never used  (  ) other:  Tobacco Usage:  (   ) never smoked   (   ) former smoker   ( x  ) current smoker  (     ) pack year  (        ) last cigarette date  Alcohol Usage: socially  Sexual History:     Family History   IBD (  ) Yes   (  ) No  GI Malignancy (  )  Yes    ( x ) No    Health Management     Last Colonoscopy - 8 years ago in [facility], reports it was normal       (     ) Advanced Directives: (     ) None    (      ) DNR    (     ) DNI    (     ) Health Care Proxy:       REVIEW OF SYSTEMS:   General: Negative  HEENT: Negative  CV: Negative  Respiratory: Negative  GI: See HPI  : Negative  MSK: Negative  Hematologic: Negative  Skin: Negative      Vital Signs Last 24 Hrs  T(C): 36.7 (17 Mar 2023 12:25), Max: 36.9 (17 Mar 2023 04:02)  T(F): 98 (17 Mar 2023 12:25), Max: 98.4 (17 Mar 2023 04:02)  HR: 72 (17 Mar 2023 12:00) (72 - 115)  BP: 111/72 (17 Mar 2023 12:00) (104/74 - 136/90)  BP(mean): 81 (17 Mar 2023 12:00) (81 - 101)  RR: 17 (17 Mar 2023 12:00) (16 - 20)  SpO2: 95% (17 Mar 2023 12:00) (92% - 100%)    Parameters below as of 17 Mar 2023 12:00  Patient On (Oxygen Delivery Method): room air        PHYSICAL EXAM:   GENERAL:  No acute distress  HEENT:  NC/AT, conjunctiva clear, sclera anicteric  CHEST:  No increased effort, breath sounds clear  HEART:  Regular rate  ABDOMEN:  Soft, non-tender, non-distended, normoactive bowel sounds, no rebound or guarding  EXTREMITIES: No edema  SKIN:  Warm, dry  NEURO:  Calm, cooperative        LABS:                        12.8   6.22  )-----------( 249      ( 17 Mar 2023 06:07 )             41.4     03-17    139  |  109<H>  |  13.7  ----------------------------<  128<H>  3.6   |  18.0<L>  |  0.55    Ca    8.5      17 Mar 2023 06:07  Mg     2.1     03-16    TPro  7.1  /  Alb  3.6  /  TBili  0.3<L>  /  DBili  x   /  AST  19  /  ALT  22  /  AlkPhos  102  03-17    PT/INR - ( 16 Mar 2023 13:50 )   PT: 13.2 sec;   INR: 1.14 ratio         PTT - ( 16 Mar 2023 13:50 )  PTT:29.6 sec  Urinalysis Basic - ( 16 Mar 2023 22:30 )    Color: Yellow / Appearance: Clear / S.020 / pH: x  Gluc: x / Ketone: Trace  / Bili: Negative / Urobili: Negative mg/dL   Blood: x / Protein: 30 mg/dL / Nitrite: Negative   Leuk Esterase: Negative / RBC: 0-2 /HPF / WBC 0-2 /HPF   Sq Epi: x / Non Sq Epi: Few / Bacteria: Negative      LIVER FUNCTIONS - ( 17 Mar 2023 06:07 )  Alb: 3.6 g/dL / Pro: 7.1 g/dL / ALK PHOS: 102 U/L / ALT: 22 U/L / AST: 19 U/L / GGT: x             RADIOLOGY & ADDITIONAL TESTS:    < from: CT Abdomen and Pelvis w/ IV Cont (23 @ 14:09) >    ACC: [id number] EXAM:  CT ABDOMEN AND PELVIS IC   ORDERED BY: ANNA MANCINI     PROCEDURE DATE:  2023          INTERPRETATION:  CLINICAL INFORMATION: Right lower quadrant pain. Nausea   and vomiting.    COMPARISON: Noncontrast CT of the abdomen andpelvis 2021.    PROCEDURE:  CT of the Abdomen and Pelvis was performed oral contrast and without   intravenous contrast.  Sagittal and coronal reformats were performed.    FINDINGS:  LOWER CHEST: Within normal limits.    LIVER: Within normal limits.  BILE DUCTS: Normal caliber.  GALLBLADDER: Within normal limits.  SPLEEN: Within normal limits.  PANCREAS: Within normal limits.  ADRENALS: Within normal limits.  KIDNEYS/URETERS: Tiny left renal cyst. Otherwise, within normal limits.   Excreted contrast media in the bilateral renal collecting systems and   ureters related to CT angiogram of the head and neck performed previously.    BLADDER: Minimally distended.  REPRODUCTIVE ORGANS: Bilateral Essure devices. Otherwise, the uterus and   adnexa are unremarkable by CT criteria.    BOWEL: No bowel obstruction. Appendix is normal. Stable trace stranding   of the mesentery with shotty perivascular lymph nodes, compatible with   mesenteric panniculitis.  PERITONEUM: No ascites.  VESSELS: Mild atherosclerotic disease of the proximal SMA. The abdominal   aorta is nonaneurysmal.  RETROPERITONEUM/LYMPH NODES: No lymphadenopathy. Stable shotty   para-aortic lymph nodes.  ABDOMINAL WALL: Within normal limits.  BONES: Within normal limits.    IMPRESSION: No evidence of acute inflammatory or obstructive process in   the abdomen and pelvis.    --- End of Report ---            CAROL PIERCE MD; Attending Radiologist  This document has been electronically signed. Mar 16 2023  2:12PM    < end of copied text >

## 2023-03-17 NOTE — PHYSICAL THERAPY INITIAL EVALUATION ADULT - PERTINENT HX OF CURRENT PROBLEM, REHAB EVAL
Pt with hx of TIA, Lt MCA anuerysm, presents with dizziness amb to bathroom.  Pt with incidental L Pcomm and L MCA bifurcation anuerysms, chronic R small cerebellar hemisphere could infarct. Positive cocaine.

## 2023-03-17 NOTE — OCCUPATIONAL THERAPY INITIAL EVALUATION ADULT - LIVES WITH, PROFILE
Lives with  and daughters/ grandkids come over frequently. Pt reports she lives in basement apartment with 6STE 2HR, none inside. Family can assist./children/spouse

## 2023-03-17 NOTE — PROGRESS NOTE ADULT - NS ATTEND AMEND GEN_ALL_CORE FT
Seen and examined with the NP.  Plan discussed with NP.  I agree with as written above with modifications as below    Vertigo  MRI brain did not show stroke  Utox (+) cocaine  cocaine cessation advised  to follow up as outpatient with Dr Hernández (JAMIL) re aneurysm    Thank you for allowing me to participate in the care of your patient    Avinash Langford MD, PhD   [id number]

## 2023-03-18 ENCOUNTER — TRANSCRIPTION ENCOUNTER (OUTPATIENT)
Age: 53
End: 2023-03-18

## 2023-03-18 VITALS
SYSTOLIC BLOOD PRESSURE: 128 MMHG | TEMPERATURE: 98 F | HEART RATE: 83 BPM | OXYGEN SATURATION: 96 % | DIASTOLIC BLOOD PRESSURE: 85 MMHG | RESPIRATION RATE: 18 BRPM

## 2023-03-18 DIAGNOSIS — Q21.12 PATENT FORAMEN OVALE: ICD-10-CM

## 2023-03-18 DIAGNOSIS — I63.9 CEREBRAL INFARCTION, UNSPECIFIED: ICD-10-CM

## 2023-03-18 LAB
ALBUMIN SERPL ELPH-MCNC: 3.4 G/DL — SIGNIFICANT CHANGE UP (ref 3.3–5.2)
ALP SERPL-CCNC: 101 U/L — SIGNIFICANT CHANGE UP (ref 40–120)
ALT FLD-CCNC: 21 U/L — SIGNIFICANT CHANGE UP
ANION GAP SERPL CALC-SCNC: 10 MMOL/L — SIGNIFICANT CHANGE UP (ref 5–17)
AST SERPL-CCNC: 16 U/L — SIGNIFICANT CHANGE UP
BILIRUB SERPL-MCNC: 0.2 MG/DL — LOW (ref 0.4–2)
BUN SERPL-MCNC: 13.9 MG/DL — SIGNIFICANT CHANGE UP (ref 8–20)
CALCIUM SERPL-MCNC: 8.4 MG/DL — SIGNIFICANT CHANGE UP (ref 8.4–10.5)
CHLORIDE SERPL-SCNC: 108 MMOL/L — SIGNIFICANT CHANGE UP (ref 96–108)
CO2 SERPL-SCNC: 22 MMOL/L — SIGNIFICANT CHANGE UP (ref 22–29)
CREAT SERPL-MCNC: 0.54 MG/DL — SIGNIFICANT CHANGE UP (ref 0.5–1.3)
EGFR: 109 ML/MIN/1.73M2 — SIGNIFICANT CHANGE UP
GLUCOSE BLDC GLUCOMTR-MCNC: 109 MG/DL — HIGH (ref 70–99)
GLUCOSE BLDC GLUCOMTR-MCNC: 110 MG/DL — HIGH (ref 70–99)
GLUCOSE BLDC GLUCOMTR-MCNC: 114 MG/DL — HIGH (ref 70–99)
GLUCOSE SERPL-MCNC: 120 MG/DL — HIGH (ref 70–99)
HCT VFR BLD CALC: 38.8 % — SIGNIFICANT CHANGE UP (ref 34.5–45)
HGB BLD-MCNC: 12.2 G/DL — SIGNIFICANT CHANGE UP (ref 11.5–15.5)
MCHC RBC-ENTMCNC: 28.5 PG — SIGNIFICANT CHANGE UP (ref 27–34)
MCHC RBC-ENTMCNC: 31.4 GM/DL — LOW (ref 32–36)
MCV RBC AUTO: 90.7 FL — SIGNIFICANT CHANGE UP (ref 80–100)
PLATELET # BLD AUTO: 257 K/UL — SIGNIFICANT CHANGE UP (ref 150–400)
POTASSIUM SERPL-MCNC: 3.9 MMOL/L — SIGNIFICANT CHANGE UP (ref 3.5–5.3)
POTASSIUM SERPL-SCNC: 3.9 MMOL/L — SIGNIFICANT CHANGE UP (ref 3.5–5.3)
PROT SERPL-MCNC: 6.6 G/DL — SIGNIFICANT CHANGE UP (ref 6.6–8.7)
RBC # BLD: 4.28 M/UL — SIGNIFICANT CHANGE UP (ref 3.8–5.2)
RBC # FLD: 12.7 % — SIGNIFICANT CHANGE UP (ref 10.3–14.5)
SODIUM SERPL-SCNC: 140 MMOL/L — SIGNIFICANT CHANGE UP (ref 135–145)
WBC # BLD: 5.69 K/UL — SIGNIFICANT CHANGE UP (ref 3.8–10.5)
WBC # FLD AUTO: 5.69 K/UL — SIGNIFICANT CHANGE UP (ref 3.8–10.5)

## 2023-03-18 PROCEDURE — 99285 EMERGENCY DEPT VISIT HI MDM: CPT | Mod: 25

## 2023-03-18 PROCEDURE — 87507 IADNA-DNA/RNA PROBE TQ 12-25: CPT

## 2023-03-18 PROCEDURE — 87046 STOOL CULTR AEROBIC BACT EA: CPT

## 2023-03-18 PROCEDURE — 82330 ASSAY OF CALCIUM: CPT

## 2023-03-18 PROCEDURE — 70450 CT HEAD/BRAIN W/O DYE: CPT | Mod: MA

## 2023-03-18 PROCEDURE — 93005 ELECTROCARDIOGRAM TRACING: CPT

## 2023-03-18 PROCEDURE — G1004: CPT

## 2023-03-18 PROCEDURE — 0042T: CPT | Mod: MA

## 2023-03-18 PROCEDURE — 74177 CT ABD & PELVIS W/CONTRAST: CPT | Mod: MG

## 2023-03-18 PROCEDURE — 82803 BLOOD GASES ANY COMBINATION: CPT

## 2023-03-18 PROCEDURE — 83605 ASSAY OF LACTIC ACID: CPT

## 2023-03-18 PROCEDURE — 84484 ASSAY OF TROPONIN QUANT: CPT

## 2023-03-18 PROCEDURE — C8929: CPT

## 2023-03-18 PROCEDURE — 83036 HEMOGLOBIN GLYCOSYLATED A1C: CPT

## 2023-03-18 PROCEDURE — 85014 HEMATOCRIT: CPT

## 2023-03-18 PROCEDURE — 85730 THROMBOPLASTIN TIME PARTIAL: CPT

## 2023-03-18 PROCEDURE — 82962 GLUCOSE BLOOD TEST: CPT

## 2023-03-18 PROCEDURE — 80048 BASIC METABOLIC PNL TOTAL CA: CPT

## 2023-03-18 PROCEDURE — 93306 TTE W/DOPPLER COMPLETE: CPT | Mod: 26

## 2023-03-18 PROCEDURE — 83735 ASSAY OF MAGNESIUM: CPT

## 2023-03-18 PROCEDURE — 85025 COMPLETE CBC W/AUTO DIFF WBC: CPT

## 2023-03-18 PROCEDURE — 70496 CT ANGIOGRAPHY HEAD: CPT | Mod: MA

## 2023-03-18 PROCEDURE — 82435 ASSAY OF BLOOD CHLORIDE: CPT

## 2023-03-18 PROCEDURE — 99239 HOSP IP/OBS DSCHRG MGMT >30: CPT

## 2023-03-18 PROCEDURE — 85018 HEMOGLOBIN: CPT

## 2023-03-18 PROCEDURE — 80053 COMPREHEN METABOLIC PANEL: CPT

## 2023-03-18 PROCEDURE — 96375 TX/PRO/DX INJ NEW DRUG ADDON: CPT

## 2023-03-18 PROCEDURE — 80061 LIPID PANEL: CPT

## 2023-03-18 PROCEDURE — 99223 1ST HOSP IP/OBS HIGH 75: CPT

## 2023-03-18 PROCEDURE — 70498 CT ANGIOGRAPHY NECK: CPT | Mod: MA

## 2023-03-18 PROCEDURE — 82947 ASSAY GLUCOSE BLOOD QUANT: CPT

## 2023-03-18 PROCEDURE — 85027 COMPLETE CBC AUTOMATED: CPT

## 2023-03-18 PROCEDURE — 0225U NFCT DS DNA&RNA 21 SARSCOV2: CPT

## 2023-03-18 PROCEDURE — 36415 COLL VENOUS BLD VENIPUNCTURE: CPT

## 2023-03-18 PROCEDURE — 83690 ASSAY OF LIPASE: CPT

## 2023-03-18 PROCEDURE — 84295 ASSAY OF SERUM SODIUM: CPT

## 2023-03-18 PROCEDURE — 81001 URINALYSIS AUTO W/SCOPE: CPT

## 2023-03-18 PROCEDURE — 87045 FECES CULTURE AEROBIC BACT: CPT

## 2023-03-18 PROCEDURE — 85610 PROTHROMBIN TIME: CPT

## 2023-03-18 PROCEDURE — 87177 OVA AND PARASITES SMEARS: CPT

## 2023-03-18 PROCEDURE — 70551 MRI BRAIN STEM W/O DYE: CPT | Mod: MG

## 2023-03-18 PROCEDURE — 84132 ASSAY OF SERUM POTASSIUM: CPT

## 2023-03-18 PROCEDURE — 80307 DRUG TEST PRSMV CHEM ANLYZR: CPT

## 2023-03-18 PROCEDURE — 96374 THER/PROPH/DIAG INJ IV PUSH: CPT | Mod: XU

## 2023-03-18 PROCEDURE — T1013: CPT

## 2023-03-18 RX ADMIN — HEPARIN SODIUM 5000 UNIT(S): 5000 INJECTION INTRAVENOUS; SUBCUTANEOUS at 05:27

## 2023-03-18 RX ADMIN — Medication 75 MILLIGRAM(S): at 12:28

## 2023-03-18 RX ADMIN — PANTOPRAZOLE SODIUM 40 MILLIGRAM(S): 20 TABLET, DELAYED RELEASE ORAL at 17:23

## 2023-03-18 RX ADMIN — Medication 20 MILLIGRAM(S): at 07:41

## 2023-03-18 RX ADMIN — PANTOPRAZOLE SODIUM 40 MILLIGRAM(S): 20 TABLET, DELAYED RELEASE ORAL at 05:27

## 2023-03-18 RX ADMIN — Medication 20 MILLIGRAM(S): at 16:47

## 2023-03-18 RX ADMIN — CLOPIDOGREL BISULFATE 75 MILLIGRAM(S): 75 TABLET, FILM COATED ORAL at 12:29

## 2023-03-18 RX ADMIN — HEPARIN SODIUM 5000 UNIT(S): 5000 INJECTION INTRAVENOUS; SUBCUTANEOUS at 13:43

## 2023-03-18 NOTE — CONSULT NOTE ADULT - PROBLEM SELECTOR RECOMMENDATION 9
.  - Known moderate PFO from [facility] in 2021 due to CVA  - no acute CVA on MRI, event may be TIA vs cocaine induce vasospasm   - however patient would benefit from closure of PFO  - no plans for inpatient closure  - Will schedule patient for f/u with OP Cardiology office this week to schedule procedure .  - Known moderate PFO from [facility] in 2021 due to CVA  - ILR interrogated, no AT or AF  - no acute CVA on MRI, event may be TIA vs cocaine induce vasospasm   - however patient would benefit from closure of PFO  - no plans for inpatient closure  - Will schedule patient for f/u with OP Cardiology office this week to schedule procedure .  - Known moderate PFO from [facility] in 2021 after CVA  - ILR interrogated, no AT or AF  - no acute CVA on MRI, event may be TIA vs cocaine induce vasospasm   - however patient would benefit from closure of PFO  - no plans for inpatient closure  - Will schedule patient for f/u with OP Cardiology office this week to schedule procedure

## 2023-03-18 NOTE — DISCHARGE NOTE NURSING/CASE MANAGEMENT/SOCIAL WORK - PATIENT PORTAL LINK FT
You can access the FollowMyHealth Patient Portal offered by [facility] by registering at the following website: [email or web address]. By joining [facility]’s FollowMyHealth portal, you will also be able to view your health information using other applications (apps) compatible with our system.

## 2023-03-18 NOTE — DISCHARGE NOTE PROVIDER - NSDCCPCAREPLAN_GEN_ALL_CORE_FT
PRINCIPAL DISCHARGE DIAGNOSIS  Diagnosis: TIA (transient ischemic attack)  Assessment and Plan of Treatment: MRI-H- Chronic small right cerebellar hemisphere infarct. No acute infarction.   Please continue your medications as prescribed, Plavix and lipitor, You will need follow up with Neurology and IR for aneurysms.      SECONDARY DISCHARGE DIAGNOSES  Diagnosis: PFO (patent foramen ovale)  Assessment and Plan of Treatment: Cardiology consulted in the hospital   You will need outpatient follow up for closure of PFO.   You are strongly advised to stop using cocaine

## 2023-03-18 NOTE — DISCHARGE NOTE NURSING/CASE MANAGEMENT/SOCIAL WORK - NSDCPEFALRISK_GEN_ALL_CORE
For information on Fall & Injury Prevention, visit: [email or web address] OR  [email or web address] OR  https://www.cdc.gov/steadi/patient.html

## 2023-03-18 NOTE — CONSULT NOTE ADULT - PROBLEM SELECTOR RECOMMENDATION 2
.  - MRI with chronic small R cerebellar infarct, no acute infarct  - continue Plavix and atorvastatin  - advised cocaine use cessation  - advised diet, lifestyle and exercise modifications      No further inpatient cardiac work up at this time.  Will sign off.  Please reconsult if needed.

## 2023-03-18 NOTE — DISCHARGE NOTE PROVIDER - NSDCMRMEDTOKEN_GEN_ALL_CORE_FT
clopidogrel 75 mg oral tablet: 1 tab(s) orally once a day  dicyclomine 20 mg oral tablet: 1 tab(s) orally 2 times a day  Lipitor 80 mg oral tablet: 1 tab(s) orally once a day (at bedtime)   lisinopril 5 mg oral tablet: 1 tab(s) orally once a day  metFORMIN 500 mg oral tablet: 1 tab(s) orally 2 times a day  pantoprazole 40 mg oral delayed release tablet: 1 tab(s) orally once a day  venlafaxine 75 mg oral capsule, extended release: 1 cap(s) orally once a day

## 2023-03-18 NOTE — CONSULT NOTE ADULT - NS ATTEND AMEND GEN_ALL_CORE FT
Seen and examined with the PA.  Plan discussed with PA.  I agree with as written above with modifications as below    vertigo  with multiple risk factors suggest MRI to eval for stroke  Interrogate ILR  outpatient f/u with Dr Hernández for aneurysms  will follow with you    Avinash Langford MD PhD   [id number]
Patient seen and examined.  Patient with most likely infectious gastroenteritis which is already improving.  No need for further evaluation as inpatient.  If she has recurrence of diarrhea can send stool studies.  Follow-up with outpatient gastroenterologist.
cocaine abuse. tia  due to PFO vs cocaine induced vasospasm.   prior hisotry of Transient ischemic attack (TIA)   discussed with neurology. plan for outpaitent PFO closure  plavix. statins . No further in-patient cardiac work-up/management is needed.  Follow-up in cardiology office in 2 weeks.

## 2023-03-18 NOTE — DISCHARGE NOTE PROVIDER - HOSPITAL COURSE
53F w/ hx of TIA, PFO, PCOM/LT MCA aneurysm, HTN, T2DM who presents with dizziness.  TIA - cocaine induced vasospasm vs possible TIA. Utox- positive for cocaine. MRI-H- Chronic small right cerebellar hemisphere infarct. No acute infarction. will c/w Plavix and lipitor, Neuro recs appreciated. Patient with incidental 1-2mm left PCOMM and left MCA bifurcation aneurysms. Will need o/p follow up with Dr. Hernández for aneurysms. TTE reviewed, Cardiology consulted as pt has not had follow up outpatient due to insurance issues. Per Cardiology will need outpatient follow up for closure of PFO. ILR interrogated. Pt also with Infectious diarrhea, GI PCR + for Rotavirus and CT A/P- No evidence of acute inflammatory or obstructive process in the abdomen and pelvis. c/w supportive care at home.

## 2023-03-18 NOTE — DISCHARGE NOTE PROVIDER - CARE PROVIDERS DIRECT ADDRESSES
,[email or web address]

## 2023-03-18 NOTE — PROGRESS NOTE ADULT - SUBJECTIVE AND OBJECTIVE BOX
[facility] of Hospital Medicine    Chief Complaint:  Dizziness     SUBJECTIVE / OVERNIGHT EVENTS:  with ongoing diarrhea     Patient denies chest pain, SOB, abd pain, N/V, fever, chills, dysuria or any other complaints. All remainder ROS negative.     MEDICATIONS  (STANDING):  atorvastatin 80 milliGRAM(s) Oral at bedtime  clopidogrel Tablet 75 milliGRAM(s) Oral daily  dextrose 5%. 1000 milliLiter(s) (50 mL/Hr) IV Continuous <Continuous>  dextrose 5%. 1000 milliLiter(s) (100 mL/Hr) IV Continuous <Continuous>  dextrose 50% Injectable 25 Gram(s) IV Push once  dextrose 50% Injectable 12.5 Gram(s) IV Push once  dextrose 50% Injectable 25 Gram(s) IV Push once  dicyclomine 20 milliGRAM(s) Oral two times a day before meals  glucagon  Injectable 1 milliGRAM(s) IntraMuscular once  heparin   Injectable 5000 Unit(s) SubCutaneous every 8 hours  influenza   Vaccine 0.5 milliLiter(s) IntraMuscular once  insulin lispro (ADMELOG) corrective regimen sliding scale   SubCutaneous three times a day before meals  insulin lispro (ADMELOG) corrective regimen sliding scale   SubCutaneous at bedtime  pantoprazole  Injectable 40 milliGRAM(s) IV Push two times a day  venlafaxine 75 milliGRAM(s) Oral daily    MEDICATIONS  (PRN):  dextrose Oral Gel 15 Gram(s) Oral once PRN Blood Glucose LESS THAN 70 milliGRAM(s)/deciliter  ondansetron Injectable 4 milliGRAM(s) IV Push every 6 hours PRN Nausea and/or Vomiting        I&O's Summary    17 Mar 2023 07:01  -  18 Mar 2023 07:00  --------------------------------------------------------  IN: 480 mL / OUT: 0 mL / NET: 480 mL        PHYSICAL EXAM:  Vital Signs Last 24 Hrs  T(C): 36.9 (18 Mar 2023 12:17), Max: 36.9 (18 Mar 2023 12:17)  T(F): 98.5 (18 Mar 2023 12:17), Max: 98.5 (18 Mar 2023 12:17)  HR: 89 (18 Mar 2023 12:17) (72 - 89)  BP: 111/77 (18 Mar 2023 12:17) (106/70 - 119/75)  BP(mean): 86 (17 Mar 2023 18:05) (78 - 86)  RR: 18 (18 Mar 2023 12:17) (18 - 18)  SpO2: 97% (18 Mar 2023 12:17) (95% - 98%)    Parameters below as of 18 Mar 2023 12:17  Patient On (Oxygen Delivery Method): room air          CONSTITUTIONAL: NAD, sitting up in bed  ENMT: Moist oral mucosa, EOMI   RESPIRATORY: Normal respiratory effort; lungs are clear to auscultation bilaterally  CARDIOVASCULAR: Regular rate and rhythm, normal S1 and S2   ABDOMEN: Nontender to palpation, normoactive bowel sounds  MUSCULOSKELETAL:  No clubbing or cyanosis of digits   PSYCH: A+O to person, place, and time; affect appropriate  NEUROLOGY: No gross sensory or motor deficits   SKIN: warm and dry       LABS:                        12.2   5.69  )-----------( 257      ( 18 Mar 2023 05:48 )             38.8     03-18    140  |  108  |  13.9  ----------------------------<  120<H>  3.9   |  22.0  |  0.54    Ca    8.4      18 Mar 2023 05:48  Mg     2.1     03-16    TPro  6.6  /  Alb  3.4  /  TBili  0.2<L>  /  DBili  x   /  AST  16  /  ALT  21  /  AlkPhos  101  03-18    PT/INR - ( 16 Mar 2023 13:50 )   PT: 13.2 sec;   INR: 1.14 ratio         PTT - ( 16 Mar 2023 13:50 )  PTT:29.6 sec  CARDIAC MARKERS ( 16 Mar 2023 13:50 )  x     / <0.01 ng/mL / x     / x     / x          Urinalysis Basic - ( 16 Mar 2023 22:30 )    Color: Yellow / Appearance: Clear / S.020 / pH: x  Gluc: x / Ketone: Trace  / Bili: Negative / Urobili: Negative mg/dL   Blood: x / Protein: 30 mg/dL / Nitrite: Negative   Leuk Esterase: Negative / RBC: 0-2 /HPF / WBC 0-2 /HPF   Sq Epi: x / Non Sq Epi: Few / Bacteria: Negative        CAPILLARY BLOOD GLUCOSE      POCT Blood Glucose.: 110 mg/dL (18 Mar 2023 11:38)  POCT Blood Glucose.: 114 mg/dL (18 Mar 2023 07:38)  POCT Blood Glucose.: 153 mg/dL (17 Mar 2023 21:19)  POCT Blood Glucose.: 148 mg/dL (17 Mar 2023 17:25)

## 2023-03-18 NOTE — CONSULT NOTE ADULT - ASSESSMENT
ASSESSMENT:  54 y/o F hx of TIA, PFO on echo, ILR, PCOM/L MCA aneurysms from prior imaging, HTN, DM presented w/ dizziness. CT head demonstrated no acute infarction or hemorrhage.  Patient was outside of Tenecteplase window.  CT angiogram without large vessel occlusion therefor no thrombectomy.  Incidental 1-2mm left PCOMM and left MCA bifurcation aneurysms which should be followed closely.  Rule out cerebral infarction. Patient with diarrhea and vomiting.     NEURO: Continue close monitoring for neurologic deterioration, sx control of symptoms, permissive HTN avoid rapid fluctuations and hypotension, titrate statin to LDL goal less than 70, MRI Brain w/o. Close outpatient monitory with Neuro IR (Dr. Oscar Hernández) at discharge,  Physical therapy/OT/Speech eval/treatment.     ANTITHROMBOTIC THERAPY: ASA 81mg daily if no contraindication     PULMONARY:  protecting airway, saturating well     CARDIOVASCULAR: check TTE, cardiac monitoring, ILR interrogation                              GASTROINTESTINAL:  dysphagia screen  , GI workup per medicine team.     RENAL: BUN/Cr within range, maintain adequate hydration, good urine output      Na Goal: Greater than 135    HEMATOLOGY: H/H within range, continue close monitoring , Platelets 295, age and risk appropriate malignancy screenings      DVT ppx: no neurological contraindication to pharmacological dvt ppx     ID: afebrile, no leukocytosis , infectious workup as indicated     DISPOSITION: Rehab or home depending on PT eval once stable and workup is complete      CORE MEASURES:        Admission NIHSS: 3     Tenecteplase : [] YES [x] NO      LDL/HDL/A1C: p     Depression Screen: p     Statin Therapy: as noted      Dysphagia Screen: [x] PASS [] FAIL     Smoking [x] YES [] NO      Afib [] YES [x] NO     Stroke Education [x] YES [] NO    
53 F with PMHx of TIA, PFO, PCOM/LT MCA aneurysm, HTN, T2DM who presents with dizziness when walking to the bathroom, her last known well was 8pm on 3/15. She was admitted to the hospital for stroke workup. She initially reported lower abdominal discomfort, intermittent episodes of loose watery bowel movements and nausea/ vomiting 
53F w/ PMHx of TIA, PFO, PCOM/LT MCA aneurysm, HTN, T2DM, Obesity, cocaine misuse who presented to [facility] with dizziness, blurred vision and difficulty ambulating, which resolved after 6 hours, but when headaches, N/V, abdominal pain and diarrhea continued she decided to come in for further evaluation. Plan for f/u as OP to schedule PFO closure.

## 2023-03-18 NOTE — DISCHARGE NOTE PROVIDER - PROVIDER TOKENS
PROVIDER:[TOKEN:[[id number]]],PROVIDER:[TOKEN:[[id number]]],PROVIDER:[TOKEN:[[id number]]]

## 2023-03-18 NOTE — CONSULT NOTE ADULT - SUBJECTIVE AND OBJECTIVE BOX
[facility]                                              CARDIOLOGY AT [facility]                                                   [address], [place] [address]                                             Telephone: [phone]. Fax:[phone]                                                       CARDIOLOGY CONSULTATION NOTE                                                                                             History obtained by: Patient and medical record  Community Cardiologist: none, lost to follow up due to insurance issues   obtained: Yes [x] No [  ]  Reason for Consultation: CVA, PFO  Available out pt records reviewed: Yes [  ] No [  ]    Chief complaint:    Patient is a 53y old  Female who presents with a chief complaint of dizziness. (18 Mar 2023 13:35)      HPI:  53F w/ PMHx of TIA, PFO, PCOM/LT MCA aneurysm, HTN, T2DM, Obesity who presented to [facility] with dizziness, blurred vision and difficulty ambulating, which resolved after 6 hours, but when headaches, N/V, abdominal pain and diarrhea continued she decided to come in for further evaluation. She endorses having used cocaine and uses occasionally. In ED code stroke called. MRI of the brain revealed chronic small infarct but no acute infarction. Of note, patient presented to the hospital in 2021 with dizziness and RT-sided facial droop and was found to have aneurysm at the LT MCA bifurcation and moderate PFO, which was medically managed. Denies fever, chills headache, chills, chest pain, and SOB.        CARDIAC TESTING   ECHO:    STRESS:    CATH:     ELECTROPHYSIOLOGY:     PAST MEDICAL HISTORY  HTN (hypertension)    HLD (hyperlipidemia)    Diabetes        PAST SURGICAL HISTORY  No significant past surgical history    H/O:         SOCIAL HISTORY:  Denies smoking/alcohol/drugs  CIGARETTES:     ALCOHOL:  DRUGS:    FAMILY HISTORY:  Family history of hypertension in mother  she is alive 83 yo    Family history of coronary artery disease (Father)  father  at age 64 from MI      Family History of Cardiovascular Disease:  Yes [  ] No [  ]  Coronary Artery Disease in first degree relative: Yes [  ] No [  ]  Sudden Cardiac Death in First degree relative: Yes [  ] No [  ]    HOME MEDICATIONS:  dicyclomine 20 mg oral tablet: 1 tab(s) orally 2 times a day (16 Mar 2023 17:30)  lisinopril 5 mg oral tablet: 1 tab(s) orally once a day (16 Mar 2023 17:30)  metFORMIN 500 mg oral tablet: 1 tab(s) orally 2 times a day (16 Mar 2023 17:30)  pantoprazole 40 mg oral delayed release tablet: 1 tab(s) orally once a day (16 Mar 2023 17:30)  venlafaxine 75 mg oral capsule, extended release: 1 cap(s) orally once a day (16 Mar 2023 17:30)      CURRENT CARDIAC MEDICATIONS:      CURRENT OTHER MEDICATIONS:  ondansetron Injectable 4 milliGRAM(s) IV Push every 6 hours PRN Nausea and/or Vomiting  venlafaxine 75 milliGRAM(s) Oral daily  dicyclomine 20 milliGRAM(s) Oral two times a day before meals  pantoprazole  Injectable 40 milliGRAM(s) IV Push two times a day  atorvastatin 80 milliGRAM(s) Oral at bedtime  clopidogrel Tablet 75 milliGRAM(s) Oral daily  dextrose 5%. 1000 milliLiter(s) (50 mL/Hr) IV Continuous <Continuous>  dextrose 5%. 1000 milliLiter(s) (100 mL/Hr) IV Continuous <Continuous>  dextrose 50% Injectable 25 Gram(s) IV Push once, Stop order after: 1 Doses  dextrose 50% Injectable 12.5 Gram(s) IV Push once, Stop order after: 1 Doses  dextrose 50% Injectable 25 Gram(s) IV Push once, Stop order after: 1 Doses  dextrose Oral Gel 15 Gram(s) Oral once, Stop order after: 1 Doses PRN Blood Glucose LESS THAN 70 milliGRAM(s)/deciliter  glucagon  Injectable 1 milliGRAM(s) IntraMuscular once, Stop order after: 1 Doses  heparin   Injectable 5000 Unit(s) SubCutaneous every 8 hours  influenza   Vaccine 0.5 milliLiter(s) IntraMuscular once  insulin lispro (ADMELOG) corrective regimen sliding scale   SubCutaneous three times a day before meals  insulin lispro (ADMELOG) corrective regimen sliding scale   SubCutaneous at bedtime      ALLERGIES:   No Known Allergies      REVIEW OF SYMPTOMS:   CONSTITUTIONAL: No fever, no chills, no weight loss, no weight gain, no fatigue   ENMT:  No vertigo; No sinus or throat pain  NECK: No pain or stiffness  CARDIOVASCULAR: No chest pain, no dyspnea, no syncope/presyncope, no palpitations, no dizziness, no Orthopnea, no Paroxsymal nocturnal dyspnea  RESPIRATORY: no Shortness of breath, no cough, no wheezing  : No dysuria, no hematuria   GI: No dark color stool, no nausea, no diarrhea, no constipation, no abdominal pain   NEURO: No headache, no slurred speech   MUSCULOSKELETAL: No joint pain or swelling; No muscle, back, or extremity pain  PSYCH: No agitation, no anxiety.    ALL OTHER REVIEW OF SYSTEMS ARE NEGATIVE.    VITAL SIGNS:  T(C): 36.9 (23 @ 12:17), Max: 36.9 (23 @ 12:17)  T(F): 98.5 (23 @ 12:17), Max: 98.5 (23 @ 12:17)  HR: 89 (23 @ 12:17) (72 - 89)  BP: 111/77 (23 @ 12:17) (106/70 - 119/75)  RR: 18 (23 @ 12:17) (18 - 18)  SpO2: 97% (23 @ :17) (95% - 98%)    INTAKE AND OUTPUT:      @ 07:01  -   @ 07:00  --------------------------------------------------------  IN: 480 mL / OUT: 0 mL / NET: 480 mL        PHYSICAL EXAM:  Constitutional: Comfortable . No acute distress.   HEENT: Atraumatic and normocephalic , neck is supple . no JVD. No carotid bruit.  CNS: A&Ox3. No focal deficits.   Respiratory: CTAB, unlabored   Cardiovascular: RRR normal s1 s2. No murmur. No rubs or gallop.  Gastrointestinal: Soft, non-tender. +Bowel sounds.   Extremities: 2+ Peripheral Pulses, No clubbing, cyanosis, or edema  Psychiatric: Calm . no agitation.   Skin: Warm and dry, no ulcers on extremities     LABS:  ( 16 Mar 2023 13:50 )  Troponin T  <0.01,  CPK  X    , CKMB  X    , BNP X                                  12.2   5.69  )-----------( 257      ( 18 Mar 2023 05:48 )             38.8     -    140  |  108  |  13.9  ----------------------------<  120<H>  3.9   |  22.0  |  0.54    Ca    8.4      18 Mar 2023 05:48  Mg     2.1     -    TPro  6.6  /  Alb  3.4  /  TBili  0.2<L>  /  DBili  x   /  AST  16  /  ALT  21  /  AlkPhos  101  -18      Urinalysis Basic - ( 16 Mar 2023 22:30 )    Color: Yellow / Appearance: Clear / S.020 / pH: x  Gluc: x / Ketone: Trace  / Bili: Negative / Urobili: Negative mg/dL   Blood: x / Protein: 30 mg/dL / Nitrite: Negative   Leuk Esterase: Negative / RBC: 0-2 /HPF / WBC 0-2 /HPF   Sq Epi: x / Non Sq Epi: Few / Bacteria: Negative      23 @ 20:22  CHolesterol: 94,  HDL: 24,  LDL: 31, Triglycerides: 196           INTERPRETATION OF TELEMETRY:     ECG:   Prior ECG: Yes [  ] No [  ]    RADIOLOGY & ADDITIONAL STUDIES:    X-ray:    CT scan:   MRI:   US:                                                [facility]                                              CARDIOLOGY AT [address], [place] [address]                                             Telephone: [phone]. Fax:[phone]                                                       CARDIOLOGY CONSULTATION NOTE                                                                                             History obtained by: Patient and medical record  Community Cardiologist: none, lost to follow up due to insurance issues   obtained: Yes [x] No [  ]  Reason for Consultation: CVA, PFO  Available out pt records reviewed: Yes [ x ] No [  ]    Chief complaint:    Patient is a 53y old  Female who presents with a chief complaint of dizziness. (18 Mar 2023 13:35)      HPI:  53F w/ PMHx of TIA, PFO, PCOM/LT MCA aneurysm, HTN, T2DM, Obesity who presented to [facility] with dizziness, blurred vision and difficulty ambulating, which resolved after 6 hours, but when headaches, N/V, abdominal pain and diarrhea continued she decided to come in for further evaluation. She endorses having used cocaine and uses occasionally. In ED code stroke called. MRI of the brain revealed chronic small infarct but no acute infarction. Of note, patient presented to the hospital in 2021 with dizziness and RT-sided facial droop and was found to have aneurysm at the LT MCA bifurcation and moderate PFO, which was medically managed. She was lost to follow up due to insurance issues but has now been making appointments for follow up. Cardiology consulted for PFO closure. Denies fever, chills headache, chills, chest pain, and SOB.        CARDIAC TESTING   ECHO: < from: TTE Echo Complete w/ Contrast w/ Doppler (23 @ 10:13) >  PHYSICIAN INTERPRETATION:  Left Ventricle: The left ventricular internal cavity size is normal. Left   ventricular wall thickness is normal.  Global LV systolic function was normal. Left ventricular ejection   fraction, by visual estimation, is 55 to 60%. Spectral Doppler shows   impaired relaxation pattern of left ventricular myocardial filling (Grade   I diastolic dysfunction).  Right Ventricle: The right ventricular size is mildly enlarged. RV   systolic function is normal.  Left Atrium: The left atrium is normal in size. Lipomatous hypertrophy of   the intra-atrial septum.  Right Atrium: The right atrium is normal in size.  Pericardium: There is no evidence of pericardial effusion.  Mitral Valve: The mitral valve is normal in structure. Mitral leaflet   mobility is normal. Trace mitral valve regurgitation is seen.  Tricuspid Valve: The tricuspid valve is normal in structure. Trivial   tricuspid regurgitation is visualized.  Aortic Valve: The aortic valve is trileaflet. Sclerotic aortic valve with   normal opening. No evidence of aortic valve regurgitation is seen.  Pulmonic Valve: The pulmonic valve is normal. Trace pulmonic valve   regurgitation.  Aorta: The aortic root is normal in size and structure.  Pulmonary Artery: The pulmonary artery is of normal size and origin.  Venous: The inferior vena cava was normal sized, with respiratory size   variation greater than 50%.  In comparison to the previous echocardiogram(s): Prior examinations are   available and were reviewed for comparison purposes.      Summary:   1. Left ventricular ejection fraction, by visual estimation, is 55 to   60%.   2. Normal global left ventricular systolic function.   3. Spectral Doppler shows impaired relaxation pattern of left   ventricular myocardial filling (Grade I diastolic dysfunction).   4. Mildly enlarged right ventricle, with normal RV function (TAPSE   2.14cm).   5. The left atrium is normal in size.   6. Trace mitral valve regurgitation.   7. Lipomatous hypertrophy of the intra-atrial septum and mobile septum.   8. Sclerotic aortic valve with normal opening.   9. There is no evidence of pericardial effusion.    Ansley Juarez MD Electronically signed on 3/18/2023 at 11:47:06 AM    < end of copied text >  < from: CATY Echo Doppler (21 @ 17:02) >  Summary:   1. Left ventricular ejection fraction, by visual estimation, is 60 to 65%.   2. Normal global left ventricular systolic function.   3. Normal right ventricular size and function, inadequate estimation of RVSP.   4. Mildly enlarged left atrium.   5. No left atrial appendage thrombusand normal left atrial appendage velocities.   6. Moderately sized patent foramen ovale, with bidirectional shunting across atrial septum, R-to-L shunting during episode of sleep apnea/increased intra-thoracic pressure.   7. Mobile intra-atrial septum borderline aneurysmal and lipomatous hypertrophy.   8. There is no evidence of pericardial effusion.    Renny Ramirez DO Electronically signed on 2021 at 5:29:12 PM    *** Final ***    TARA NUNES   This document has been electronically signed. 2021  5:02PM    < end of copied text >      STRESS:    CATH:     ELECTROPHYSIOLOGY:  No AT/AF on ILR    PAST MEDICAL HISTORY  HTN (hypertension)  HLD (hyperlipidemia)  Diabetes    PAST SURGICAL HISTORY  No significant past surgical history  H/O:       SOCIAL HISTORY:  Denies smoking/alcohol/drugs  CIGARETTES: occasional smoker 2-3 cigarettes/week  ALCOHOL: occasional ETOH  DRUGS: occasional cocaine use    FAMILY HISTORY:  Family history of hypertension in mother  she is alive 85 yo, also CVA    Family history of coronary artery disease (Father)  father  at age 64 from MI      Family History of Cardiovascular Disease:  Yes [ x] No [  ]  Coronary Artery Disease in first degree relative: Yes [x ] No [  ]  Sudden Cardiac Death in First degree relative: Yes [  ] No [ x]    HOME MEDICATIONS:  dicyclomine 20 mg oral tablet: 1 tab(s) orally 2 times a day (16 Mar 2023 17:30)  lisinopril 5 mg oral tablet: 1 tab(s) orally once a day (16 Mar 2023 17:30)  metFORMIN 500 mg oral tablet: 1 tab(s) orally 2 times a day (16 Mar 2023 17:30)  pantoprazole 40 mg oral delayed release tablet: 1 tab(s) orally once a day (16 Mar 2023 17:30)  venlafaxine 75 mg oral capsule, extended release: 1 cap(s) orally once a day (16 Mar 2023 17:30)      CURRENT CARDIAC MEDICATIONS:      CURRENT OTHER MEDICATIONS:  ondansetron Injectable 4 milliGRAM(s) IV Push every 6 hours PRN Nausea and/or Vomiting  venlafaxine 75 milliGRAM(s) Oral daily  dicyclomine 20 milliGRAM(s) Oral two times a day before meals  pantoprazole  Injectable 40 milliGRAM(s) IV Push two times a day  atorvastatin 80 milliGRAM(s) Oral at bedtime  clopidogrel Tablet 75 milliGRAM(s) Oral daily  dextrose 5%. 1000 milliLiter(s) (50 mL/Hr) IV Continuous <Continuous>  dextrose 5%. 1000 milliLiter(s) (100 mL/Hr) IV Continuous <Continuous>  dextrose 50% Injectable 25 Gram(s) IV Push once, Stop order after: 1 Doses  dextrose 50% Injectable 12.5 Gram(s) IV Push once, Stop order after: 1 Doses  dextrose 50% Injectable 25 Gram(s) IV Push once, Stop order after: 1 Doses  dextrose Oral Gel 15 Gram(s) Oral once, Stop order after: 1 Doses PRN Blood Glucose LESS THAN 70 milliGRAM(s)/deciliter  glucagon  Injectable 1 milliGRAM(s) IntraMuscular once, Stop order after: 1 Doses  heparin   Injectable 5000 Unit(s) SubCutaneous every 8 hours  influenza   Vaccine 0.5 milliLiter(s) IntraMuscular once  insulin lispro (ADMELOG) corrective regimen sliding scale   SubCutaneous three times a day before meals  insulin lispro (ADMELOG) corrective regimen sliding scale   SubCutaneous at bedtime      ALLERGIES:   No Known Allergies      REVIEW OF SYMPTOMS:   CONSTITUTIONAL: No fever, no chills, no weight loss, no weight gain, no fatigue   ENMT:  No vertigo; No sinus or throat pain  NECK: No pain or stiffness  CARDIOVASCULAR: No chest pain, no dyspnea, no syncope/presyncope, no palpitations, no dizziness, no Orthopnea, no Paroxsymal nocturnal dyspnea  RESPIRATORY: no Shortness of breath, no cough, no wheezing  : No dysuria, no hematuria   GI: No dark color stool, no nausea, no diarrhea, no constipation, no abdominal pain   NEURO: No headache, no slurred speech   MUSCULOSKELETAL: No joint pain or swelling; No muscle, back, or extremity pain  PSYCH: No agitation, no anxiety.    ALL OTHER REVIEW OF SYSTEMS ARE NEGATIVE.    VITAL SIGNS:  T(C): 36.9 (23 @ 12:17), Max: 36.9 (23 @ 12:17)  T(F): 98.5 (23 @ 12:17), Max: 98.5 (23 @ 12:17)  HR: 89 (23 @ 12:17) (72 - 89)  BP: 111/77 (23 @ 12:17) (106/70 - 119/75)  RR: 18 (23 @ 12:17) (18 - 18)  SpO2: 97% (23 @ 12:17) (95% - 98%)    INTAKE AND OUTPUT:      @ 07:01  -   @ 07:00  --------------------------------------------------------  IN: 480 mL / OUT: 0 mL / NET: 480 mL        PHYSICAL EXAM:  Constitutional: Comfortable . No acute distress.   HEENT: Atraumatic and normocephalic , neck is supple . no JVD. No carotid bruit.  CNS: A&Ox3. No focal deficits.   Respiratory: CTAB, unlabored   Cardiovascular: RRR normal s1 s2. No murmur. No rubs or gallop.  Gastrointestinal: Soft, non-tender. +Bowel sounds.   Extremities: 2+ Peripheral Pulses, No clubbing, cyanosis, or edema  Psychiatric: Calm . no agitation.   Skin: Warm and dry, no ulcers on extremities     LABS:  ( 16 Mar 2023 13:50 )  Troponin T  <0.01,  CPK  X    , CKMB  X    , BNP X                                  12.2   5.69  )-----------( 257      ( 18 Mar 2023 05:48 )             38.8     03-18    140  |  108  |  13.9  ----------------------------<  120<H>  3.9   |  22.0  |  0.54    Ca    8.4      18 Mar 2023 05:48  Mg     2.1     03-16    TPro  6.6  /  Alb  3.4  /  TBili  0.2<L>  /  DBili  x   /  AST  16  /  ALT  21  /  AlkPhos  101  -18      Urinalysis Basic - ( 16 Mar 2023 22:30 )    Color: Yellow / Appearance: Clear / S.020 / pH: x  Gluc: x / Ketone: Trace  / Bili: Negative / Urobili: Negative mg/dL   Blood: x / Protein: 30 mg/dL / Nitrite: Negative   Leuk Esterase: Negative / RBC: 0-2 /HPF / WBC 0-2 /HPF   Sq Epi: x / Non Sq Epi: Few / Bacteria: Negative      23 @ 20:22  CHolesterol: 94,  HDL: 24,  LDL: 31, Triglycerides: 196           INTERPRETATION OF TELEMETRY:     ECG: NSR, ALBA  Prior ECG: Yes [  ] No [  ]    RADIOLOGY & ADDITIONAL STUDIES:    X-ray:    CT scan: < from: CT Abdomen and Pelvis w/ IV Cont (23 @ 14:09) >    IMPRESSION: No evidence of acute inflammatory or obstructive process in   the abdomen and pelvis.    --- End of Report ---    < end of copied text >    MRI:   < from: MR Head No Cont (23 @ 21:57) >  FINDINGS:  Chronic small right cerebellar hemisphere could infarct.  There is no abnormal restricted diffusion to suggest acute infarction. No   abnormal signal is demonstrated throughout the brain parenchyma. Normal   T2 flow-voids are seen within  the intracranial vasculature. The lateral   ventricles and cortical sulci are age-appropriate in size and   configuration. There is no mass, mass effect, or extra-axial fluid   collection. There is no susceptibility artifact to suggest hemorrhage.   Midline structures are normal.  The visualized paranasal sinuses, mastoid   air cells and orbits are unremarkable.      IMPRESSION: No acute infarction.    --- End of Report ---    < end of copied text >  US:

## 2023-03-18 NOTE — DISCHARGE NOTE PROVIDER - CARE PROVIDER_API CALL
Avinash Langford; PhD)  Neurology; Vascular Neurology  [address], [address]  [place] [address]  Phone: [phone]  Fax: [phone]  Follow Up Time:     Oscar Hernández)  Neurology; Vascular Neurology  [address] [place] [address]  Phone: [phone]  Fax: [phone]  Follow Up Time:     Kay Cochran)  Cardiology; Internal Medicine  [address], [address]  [place], [place] [id number]  Phone: [phone]  Fax: [phone]  Follow Up Time:

## 2023-03-18 NOTE — CHART NOTE - NSCHARTNOTEFT_GEN_A_CORE
Asked to speak with the pt.. Discussed with  Kin (#[id number]).  Pt. states she hopes to be dced home today. She  states her daughters are available to pick her up.   DC paperwork is being done by attending and the medications are being sent to  [facility] [address] [facility] as per pt's request.  She will F/U with PMD Dr. Jhon Selby, and Cardiology as outpt.  All the questions answered to the pt's satisfaction, and pt. stated her full understanding.  Discussed above with the attending .

## 2023-03-18 NOTE — PROGRESS NOTE ADULT - ASSESSMENT
53F w/ hx of TIA, PFO, PCOM/LT MCA aneurysm, HTN, T2DM who presents with dizziness.    TIA   - cocaine induced vasospasm vs possible TIA  - MRI-H- Chronic small right cerebellar hemisphere infarct. No acute infarction  - w/ dizziness, blurred vision and difficulty ambulating   - will c/w Plavix and lipitor  - neuro recs appreciated  - Utox- positive for cocaine  - Patient with incidental 1-2mm left PCOMM and left MCA bifurcation aneurysms. Will need o/p follow up with Dr. Hernández for aneurysms  - F/u PT/OT and S/S evaluation    Moderate PFO   - ILR interrogated  - F/u TTE  - c/w medical management    Infectious diarrhea   - GI PCR + for Rotavirus   - c/w maintenance IVF  - F/u CT A/P- No evidence of acute inflammatory or obstructive process in the abdomen and pelvis.  - zofran PRN  - GI c/s appreciated   - F/u stool studies    T2DM   - hold home metformin  - will c/w SSI    HTN   - will hold lisinopril     DVT ppx- heparin ppx   Dispo- Pending clinical improvement.     53F w/ hx of TIA, PFO, PCOM/LT MCA aneurysm, HTN, T2DM who presents with dizziness.    TIA   - cocaine induced vasospasm vs possible TIA  - MRI-H- Chronic small right cerebellar hemisphere infarct. No acute infarction  - w/ dizziness, blurred vision and difficulty ambulating   - will c/w Plavix and lipitor  - neuro recs appreciated  - Utox- positive for cocaine  - Patient with incidental 1-2mm left PCOMM and left MCA bifurcation aneurysms. Will need o/p follow up with Dr. Hernández for aneurysms  - TTE reviewed, will consult Cardiology as pt has not had follow up outpatient due to insurance issues     Moderate PFO   - ILR interrogated  - F/u TTE  - c/w medical management    Infectious diarrhea   - GI PCR + for Rotavirus   - c/w maintenance IVF  - F/u CT A/P- No evidence of acute inflammatory or obstructive process in the abdomen and pelvis.  - zofran PRN  - GI c/s appreciated   - F/u stool studies    T2DM   - hold home metformin  - will c/w SSI    HTN   - will hold lisinopril     DVT ppx- heparin ppx   Dispo- Pending clinical improvement

## 2023-03-19 LAB
CULTURE RESULTS: SIGNIFICANT CHANGE UP
SPECIMEN SOURCE: SIGNIFICANT CHANGE UP

## 2023-03-21 LAB
CULTURE RESULTS: SIGNIFICANT CHANGE UP
SPECIMEN SOURCE: SIGNIFICANT CHANGE UP

## 2023-04-12 ENCOUNTER — APPOINTMENT (OUTPATIENT)
Dept: CARDIOLOGY | Facility: CLINIC | Age: 53
End: 2023-04-12
Payer: MEDICAID

## 2023-04-12 ENCOUNTER — NON-APPOINTMENT (OUTPATIENT)
Age: 53
End: 2023-04-12

## 2023-04-12 VITALS
WEIGHT: 228 LBS | TEMPERATURE: 97.7 F | OXYGEN SATURATION: 95 % | HEART RATE: 97 BPM | HEIGHT: 66.54 IN | BODY MASS INDEX: 36.21 KG/M2 | SYSTOLIC BLOOD PRESSURE: 120 MMHG | DIASTOLIC BLOOD PRESSURE: 80 MMHG

## 2023-04-12 VITALS — DIASTOLIC BLOOD PRESSURE: 62 MMHG | SYSTOLIC BLOOD PRESSURE: 106 MMHG

## 2023-04-12 VITALS — SYSTOLIC BLOOD PRESSURE: 122 MMHG | DIASTOLIC BLOOD PRESSURE: 82 MMHG

## 2023-04-12 DIAGNOSIS — Z86.73 PERSONAL HISTORY OF TRANSIENT ISCHEMIC ATTACK (TIA), AND CEREBRAL INFARCTION W/OUT RESIDUAL DEFICITS: ICD-10-CM

## 2023-04-12 PROCEDURE — 93000 ELECTROCARDIOGRAM COMPLETE: CPT

## 2023-04-12 PROCEDURE — 99214 OFFICE O/P EST MOD 30 MIN: CPT | Mod: 25

## 2023-04-12 RX ORDER — ASPIRIN ENTERIC COATED TABLETS 81 MG 81 MG/1
81 TABLET, DELAYED RELEASE ORAL
Qty: 90 | Refills: 0 | Status: DISCONTINUED | COMMUNITY
Start: 2021-12-09 | End: 2023-04-12

## 2023-04-13 ENCOUNTER — NON-APPOINTMENT (OUTPATIENT)
Age: 53
End: 2023-04-13

## 2023-04-13 ENCOUNTER — APPOINTMENT (OUTPATIENT)
Dept: ELECTROPHYSIOLOGY | Facility: CLINIC | Age: 53
End: 2023-04-13
Payer: MEDICAID

## 2023-04-13 PROCEDURE — 93298 REM INTERROG DEV EVAL SCRMS: CPT

## 2023-04-13 PROCEDURE — G2066: CPT

## 2023-04-14 ENCOUNTER — APPOINTMENT (OUTPATIENT)
Dept: NEUROLOGY | Facility: CLINIC | Age: 53
End: 2023-04-14
Payer: MEDICAID

## 2023-04-14 VITALS
OXYGEN SATURATION: 98 % | HEIGHT: 65 IN | HEART RATE: 103 BPM | DIASTOLIC BLOOD PRESSURE: 84 MMHG | WEIGHT: 214 LBS | SYSTOLIC BLOOD PRESSURE: 119 MMHG | TEMPERATURE: 97.3 F | BODY MASS INDEX: 35.65 KG/M2

## 2023-04-14 PROCEDURE — 99215 OFFICE O/P EST HI 40 MIN: CPT

## 2023-04-14 NOTE — DISCUSSION/SUMMARY
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 \par \par Most recent evaluation at [facility] 3/16/2023 for dizziness, nausea and vomiting with cocaine positive urine drug test. Negative for stroke. \par \par CATY in 2020 - large ASD with bidirectional flow, evaluated by cardiology and planned for PFO/ASD closure. \par \par I counseled the patient about the natural history of cerebral aneurysms, the potential risks for fatal or disabling hemorrhage and medical and surgical treatments. I counseled to the patient the importance of smoking cessation in regards to the elevated risk of cerebral aneurysm rupture with continued tobacco and cocaine use. \par \par PLAN:\par 1. Continue plavix - for PFO/ASD closure. \par 2. resume aspirin 81mg daily - \par 3. continue statin. \par 4. MRA head wo contrast in 1 year to follow up aneurysms. \par 5. Smoking and cocaine use cessation. \par 6. BP control\par 7. PFO/ASD closure in a few weeks. \par \par \par \par Patient was educated about signs and symptoms of stroke and was counseled to contact 911 upon emergence of strokelike symptoms. Intravenous thrombolysis and mechanical thrombectomy was also counseled and educated to the patient today.\par \par

## 2023-04-14 NOTE — PHYSICAL EXAM
[FreeTextEntry1] : GENERAL APPEARANCE:\par Well developed, well nourished woman in no acute distress.\par CARDIOVASCULAR:\par Regular rate and rhythm \par  \par NEUROLOGIC EXAM:\par MENTAL STATUS:\par Alert and Oriented to person, place and time.\par Speech is fluent, without aphasia or dysarthria\par CRANIAL NERVES:\par CN 2:    Visual fields are full to confrontation.\par CN 3, 4, 6: Extraocular movements are intact. No nystagmus or ophthalmoplegia is evident. Pupils are equally round and reactive to light.\par CN 5:     Facial sensation is intact to light touch in all 3 divisions\par CN 7:     Facial excursion is full and symmetric bilaterally.\par MOTOR:\par Strength is 5/5 throughout for age and stature.\par No orbiting or drift noted.\par SENSORY:\par Intact to light touch perception in all four extremities without extinction to double simultaneous stimulation\par COORD:\par Finger to nose testing without dysmetria bilaterally.\par GAIT:\par Normal station and gait.\par \par \par \par

## 2023-04-14 NOTE — REASON FOR VISIT
[Follow-Up: _____] : a [unfilled] follow-up visit [[employer] ] : provided by [employer]   [TWNoteComboBox1] : [place]

## 2023-04-14 NOTE — HISTORY OF PRESENT ILLNESS
[FreeTextEntry1] : Hospitalization March 16, 2023 2023 at [facility] with dizziness and blurred vision, with nausea and vomiting and headache. She was evaluated for possible TIA or recurrent ischemic stroke. MRI head neg for acute infarct or any evidence of prior intracerebral hemorrhage. CTA head and neck negative for occlusion or stenosis. Small LICA aneurysm and LMCA aneurysm stable since 2020. \par She had cocaine positive urine during this hospitalization. \par Echo was unremarkable. \par \par She has had multiple evaluations in the past for dizziness and headaches with stroke and TIA evaluations. \par 6/26/2020 - MRI Head for dizziness, unsteady gait, possible chronic cerebellar infarct - negative for acute infarct, chronic infarct or hemorrhage. 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 \par \par 11/19/2021 - MRI Head for dizziness -  negative for acute or chronic ischemic infarct or hemorrhage. No mass. Inflammatory changes in right temporal bone with middle ear and mastoid effusion. CTA head - stable 2-3mm LICA aneurysm vs infundibulum and 2-3mm LMCA aneurysm (not reported initially in 2020, but it was there at the time). \par \par 11/24/2021 - MRI head for dizziness - No acute findings. Stable from 11/19/2021. \par \par 3/16/2023 - MRI Head for dizziness - no acute infarction or hemorrhage or findings of prior hemorrhage. CTA head shows stable 2-3 mm LICA aneurysm vs infundibulum and 2-3mm LMCA aneurysm. \par \par She had prior CATY in 2020 that showed moderate ASD with bidirectional flow. \par \par She is being evaluated for PFO closure. \par \par She denies any severe headaches. She reports she is smoking 2-3 cigarettes per week. \par She reports having stopped using cocaine now. \par \par

## 2023-04-20 ENCOUNTER — APPOINTMENT (OUTPATIENT)
Dept: CARDIOLOGY | Facility: CLINIC | Age: 53
End: 2023-04-20
Payer: MEDICAID

## 2023-04-20 DIAGNOSIS — R07.89 OTHER CHEST PAIN: ICD-10-CM

## 2023-04-20 DIAGNOSIS — Q21.1 ATRIAL SEPTAL DEFECT: ICD-10-CM

## 2023-04-20 DIAGNOSIS — E11.9 TYPE 2 DIABETES MELLITUS W/OUT COMPLICATIONS: ICD-10-CM

## 2023-04-20 DIAGNOSIS — I49.3 VENTRICULAR PREMATURE DEPOLARIZATION: ICD-10-CM

## 2023-04-20 PROCEDURE — 99214 OFFICE O/P EST MOD 30 MIN: CPT

## 2023-04-21 PROBLEM — I49.3 PVC (PREMATURE VENTRICULAR CONTRACTION): Status: ACTIVE | Noted: 2021-12-09

## 2023-04-21 PROBLEM — Q21.1 PFO (PATENT FORAMEN OVALE): Status: ACTIVE | Noted: 2023-04-12

## 2023-04-25 NOTE — DISCUSSION/SUMMARY
[FreeTextEntry1] : 53 year old woman with prior cryptogenic stroke, found to have PFO with right to left shunting.\par \par Plan\par 1. hx CVA/PFO- no AF on ILR, plan for PFO closure. Discussed with patient. She is agreeable to PFO closure. Discussed risks and benefits extensively and she is in agreement. Will plan to perform in coming weeks. Continue Plavix and statin.\par 2. MCA aneurysm- follow up with neurology- Dr. Hernández\par 3. BP stable\par \par Zhang Simon MD\par \par \par \par \par

## 2023-04-25 NOTE — HISTORY OF PRESENT ILLNESS
[FreeTextEntry1] : 53F w/ hx of TIA, PFO, PCOM/LT MCA aneurysm (noted in 2021), s/p ILR in 2021, HTN, T2DM who presents for a follow up visit via telemedicine. She presented to [facility] with c/o dizziness. Utox- positive for cocaine. MRI head showed Chronic small right cerebellar hemisphere infarct. No acute infarction, also noted incidental 1-2mm left PCOMM and left MCA bifurcation aneurysms. TTE showed LVEF 55-60%, mildly enlarged RV, normal RV function, normal LA, trace MR, sclerotic AV with normal opening. She did not follow up with PFO closure in 2021 due to no insurance. ILR interrogated in hospital- no AF noted. Pt also had Infectious diarrhea, GI PCR + for Rotavirus and CT A/P- No evidence of acute inflammatory or obstructive process in the abdomen and pelvis.\par \par She was seen by SONDRA Maldonado last week. Today she feels well and has no complaints. Denies dizziness or lightheadedness. Denies shortness of breath. She is interested in proceeding with PFO closure.

## 2023-05-18 ENCOUNTER — NON-APPOINTMENT (OUTPATIENT)
Age: 53
End: 2023-05-18

## 2023-05-18 ENCOUNTER — APPOINTMENT (OUTPATIENT)
Dept: ELECTROPHYSIOLOGY | Facility: CLINIC | Age: 53
End: 2023-05-18
Payer: MEDICAID

## 2023-05-18 PROCEDURE — G2066: CPT

## 2023-05-18 PROCEDURE — 93298 REM INTERROG DEV EVAL SCRMS: CPT

## 2023-05-23 RX ORDER — CHLORHEXIDINE GLUCONATE 213 G/1000ML
1 SOLUTION TOPICAL ONCE
Refills: 0 | Status: DISCONTINUED | OUTPATIENT
Start: 2023-05-24 | End: 2023-06-07

## 2023-05-23 NOTE — H&P PST ADULT - NSICDXPASTMEDICALHX_GEN_ALL_CORE_FT
PAST MEDICAL HISTORY:  Diabetes     History of cerebral aneurysm     HLD (hyperlipidemia)     HTN (hypertension)

## 2023-05-23 NOTE — H&P PST ADULT - HISTORY OF PRESENT ILLNESS
HPI:   53F w/ hx of TIA, PFO, PCOM/LT MCA aneurysm (noted in 2021), s/p ILR in 2021, HTN, T2DM who presents for a follow up visit via telemedicine. She presented to [facility] with c/o dizziness. Utox- positive for cocaine. MRI head showed Chronic small right cerebellar hemisphere infarct. No acute infarction, also noted incidental 1-2mm left PCOMM and left MCA bifurcation aneurysms. TTE showed LVEF 55-60%, mildly enlarged RV, normal RV function, normal LA, trace MR, sclerotic AV with normal opening. She did not follow up with PFO closure in 2021 due to no insurance. ILR interrogated in hospital- no AF noted. Pt also had Infectious diarrhea, GI PCR + for Rotavirus and CT A/P- No evidence of acute inflammatory or obstructive process in the abdomen and pelvis. Now presents for PFO closure     Heart Failure: No       Systolic/Diastolic/Combined:        NYHA Class (within 2 weeks):     Assessment of LVEF (Must be within 6 months):       EF:        Assessed by:        Date:     Echo (Date, Findings):   < from: TTE Echo Complete w/ Contrast w/ Doppler (03.18.23 @ 10:13) >  Summary:   1. Left ventricular ejection fraction, by visual estimation, is 55 to   60%.   2. Normal global left ventricular systolic function.   3. Spectral Doppler shows impaired relaxation pattern of left   ventricular myocardial filling (Grade I diastolic dysfunction).        Social History:        Marital:         Tobacco:        ETOH:        Caffeine:     ROS:  CONSTITUTIONAL: No weakness, fevers or chills  EYES/ENT: No visual changes;  No vertigo or throat pain   NECK: No pain or stiffness  RESPIRATORY: No cough, wheezing, hemoptysis; No shortness of breath  CARDIOVASCULAR: No chest pain or palpitations  GASTROINTESTINAL: No abdominal or epigastric pain. No nausea, vomiting, or hematemesis; No diarrhea or constipation. No melena or hematochezia.  GENITOURINARY: No dysuria, frequency or hematuria  NEUROLOGICAL: No numbness or weakness  SKIN: No itching, burning, rashes, or lesions   All other review of systems is negative unless indicated above    PHYSICAL EXAM:    Vital Signs Last 24 Hrs  T(C): --  T(F): --  HR: --  BP: --  BP(mean): --  RR: --  SpO2: --        GENERAL: Pt lying comfortably, NAD.  ENMT: PERRL, +EOMI.  NECK: soft, Supple, No JVD,   CHEST/LUNG: Clear to auscultatation bilaterally; No wheezing.  HEART: S1S2+, Regular rate and rhythm; No murmurs.  ABDOMEN: Soft, Nontender, Nondistended; Bowel sounds present.  MUSCULOSKELETAL: Normal range of motion.  SKIN: No rashes or lesions.  NEURO: AAOX3, no focal deficits, no motor r sensory loss.  PSYCH: normal mood.  VASCULAR:   Radial +2 R/+2 L  Femoral +2 R/+2 L  PT +2 R/+2 L  DP +2 R/+2 L    EKG:            HPI:   53F w/ hx of TIA, PFO, PCOM/LT MCA aneurysm (noted in 2021), s/p ILR in 2021, HTN, T2DM who presents for a follow up visit via telemedicine. She presented to [facility] with c/o dizziness. Utox- positive for cocaine. MRI head showed Chronic small right cerebellar hemisphere infarct. No acute infarction, also noted incidental 1-2mm left PCOMM and left MCA bifurcation aneurysms. TTE showed LVEF 55-60%, mildly enlarged RV, normal RV function, normal LA, trace MR, sclerotic AV with normal opening. She did not follow up with PFO closure in 2021 due to no insurance. ILR interrogated in hospital- no AF noted. Pt also had Infectious diarrhea, GI PCR + for Rotavirus and CT A/P- No evidence of acute inflammatory or obstructive process in the abdomen and pelvis. Now presents for PFO closure     Heart Failure: No       Systolic/Diastolic/Combined: n/a       NYHA Class (within 2 weeks):     Assessment of LVEF (Must be within 6 months):       EF: 55-60%       Assessed by: TTE       Date: 3/18/23    Echo (Date, Findings):   < from: TTE Echo Complete w/ Contrast w/ Doppler (03.18.23 @ 10:13) >  Summary:   1. Left ventricular ejection fraction, by visual estimation, is 55 to   60%.   2. Normal global left ventricular systolic function.   3. Spectral Doppler shows impaired relaxation pattern of left   ventricular myocardial filling (Grade I diastolic dysfunction).    CATY 11/2021:  < from: CATY Echo Doppler (11.23.21 @ 17:02) >    Summary:   1. Left ventricular ejection fraction, by visual estimation, is 60 to 65%.   2. Normal global left ventricular systolic function.   3. Normal right ventricular size and function, inadequate estimation of RVSP.   4. Mildly enlarged left atrium.   5. No left atrial appendage thrombusand normal left atrial appendage velocities.   6. Moderately sized patent foramen ovale, with bidirectional shunting across atrial septum, R-to-L shunting during episode of sleep apnea/increased intra-thoracic pressure.   7. Mobile intra-atrial septum borderline aneurysmal and lipomatous hypertrophy.   8. There is no evidence of pericardial effusion.    Renny Ramirez DO Electronically signed on 11/23/2021 at 5:29:12 PM    < end of copied text >     HPI:   53F w/ hx of R cerebellar lacunar infarct,  TIA, PFO, PCOM/LT MCA aneurysm (noted in 2021), s/p ILR in 2021, HTN, T2DM; +cocaine use who presents for a follow up visit via telemedicine. She presented to [facility] in March 15th 2023 with c/o dizziness; blurred vision and difficulty with ambulation. Utox- positive for cocaine. MRI head showed Chronic small right cerebellar hemisphere infarct. No acute infarction, also noted incidental 1-2mm left PCOMM and left MCA bifurcation aneurysms. TTE showed LVEF 55-60%, mildly enlarged RV, normal RV function, normal LA, trace MR, sclerotic AV with normal opening. She had a CATY for similar symptoms in November 2021 which revealed EF 55-60%; and moderate sized PFO with bidirectional shunting across atrial septum.  She did not follow up with PFO closure in 2021 due to no insurance. ILR interrogated in hospital- no AF noted. Pt also had Infectious diarrhea, GI PCR + for Rotavirus and CT A/P- No evidence of acute inflammatory or obstructive process in the abdomen and pelvis. Now presents for PFO closure     Heart Failure: No       Systolic/Diastolic/Combined: n/a       NYHA Class (within 2 weeks):     Assessment of LVEF (Must be within 6 months):       EF: 55-60%       Assessed by: TTE       Date: 3/18/23    Echo (Date, Findings):   < from: TTE Echo Complete w/ Contrast w/ Doppler (03.18.23 @ 10:13) >  Summary:   1. Left ventricular ejection fraction, by visual estimation, is 55 to   60%.   2. Normal global left ventricular systolic function.   3. Spectral Doppler shows impaired relaxation pattern of left   ventricular myocardial filling (Grade I diastolic dysfunction).    CATY 11/2021:  < from: CATY Echo Doppler (11.23.21 @ 17:02) >    Summary:   1. Left ventricular ejection fraction, by visual estimation, is 60 to 65%.   2. Normal global left ventricular systolic function.   3. Normal right ventricular size and function, inadequate estimation of RVSP.   4. Mildly enlarged left atrium.   5. No left atrial appendage thrombusand normal left atrial appendage velocities.   6. Moderately sized patent foramen ovale, with bidirectional shunting across atrial septum, R-to-L shunting during episode of sleep apnea/increased intra-thoracic pressure.   7. Mobile intra-atrial septum borderline aneurysmal and lipomatous hypertrophy.   8. There is no evidence of pericardial effusion.    Renny Ramirez DO Electronically signed on 11/23/2021 at 5:29:12 PM    < end of copied text >

## 2023-05-24 ENCOUNTER — TRANSCRIPTION ENCOUNTER (OUTPATIENT)
Age: 53
End: 2023-05-24

## 2023-05-24 ENCOUNTER — OUTPATIENT (OUTPATIENT)
Dept: OUTPATIENT SERVICES | Facility: HOSPITAL | Age: 53
LOS: 1 days | Discharge: HOME | End: 2023-05-24
Payer: COMMERCIAL

## 2023-05-24 VITALS
HEIGHT: 66.14 IN | OXYGEN SATURATION: 97 % | DIASTOLIC BLOOD PRESSURE: 85 MMHG | RESPIRATION RATE: 16 BRPM | WEIGHT: 214.95 LBS | SYSTOLIC BLOOD PRESSURE: 133 MMHG | HEART RATE: 78 BPM | TEMPERATURE: 98 F

## 2023-05-24 VITALS
RESPIRATION RATE: 17 BRPM | OXYGEN SATURATION: 97 % | DIASTOLIC BLOOD PRESSURE: 62 MMHG | HEART RATE: 77 BPM | SYSTOLIC BLOOD PRESSURE: 116 MMHG

## 2023-05-24 DIAGNOSIS — Q21.10 ATRIAL SEPTAL DEFECT, UNSPECIFIED: ICD-10-CM

## 2023-05-24 DIAGNOSIS — Z98.891 HISTORY OF UTERINE SCAR FROM PREVIOUS SURGERY: Chronic | ICD-10-CM

## 2023-05-24 LAB
ABO RH CONFIRMATION: SIGNIFICANT CHANGE UP
ANION GAP SERPL CALC-SCNC: 8 MMOL/L — SIGNIFICANT CHANGE UP (ref 5–17)
BASOPHILS # BLD AUTO: 0.06 K/UL — SIGNIFICANT CHANGE UP (ref 0–0.2)
BASOPHILS NFR BLD AUTO: 0.7 % — SIGNIFICANT CHANGE UP (ref 0–2)
BLD GP AB SCN SERPL QL: SIGNIFICANT CHANGE UP
BUN SERPL-MCNC: 11.4 MG/DL — SIGNIFICANT CHANGE UP (ref 8–20)
CALCIUM SERPL-MCNC: 9 MG/DL — SIGNIFICANT CHANGE UP (ref 8.4–10.5)
CHLORIDE SERPL-SCNC: 101 MMOL/L — SIGNIFICANT CHANGE UP (ref 96–108)
CO2 SERPL-SCNC: 30 MMOL/L — HIGH (ref 22–29)
CREAT SERPL-MCNC: 0.57 MG/DL — SIGNIFICANT CHANGE UP (ref 0.5–1.3)
EGFR: 108 ML/MIN/1.73M2 — SIGNIFICANT CHANGE UP
EOSINOPHIL # BLD AUTO: 0.14 K/UL — SIGNIFICANT CHANGE UP (ref 0–0.5)
EOSINOPHIL NFR BLD AUTO: 1.6 % — SIGNIFICANT CHANGE UP (ref 0–6)
GLUCOSE SERPL-MCNC: 109 MG/DL — HIGH (ref 70–99)
HCG SERPL-ACNC: <4 MIU/ML — SIGNIFICANT CHANGE UP
HCT VFR BLD CALC: 44.6 % — SIGNIFICANT CHANGE UP (ref 34.5–45)
HGB BLD-MCNC: 14.1 G/DL — SIGNIFICANT CHANGE UP (ref 11.5–15.5)
IMM GRANULOCYTES NFR BLD AUTO: 0.2 % — SIGNIFICANT CHANGE UP (ref 0–0.9)
LYMPHOCYTES # BLD AUTO: 2.69 K/UL — SIGNIFICANT CHANGE UP (ref 1–3.3)
LYMPHOCYTES # BLD AUTO: 29.8 % — SIGNIFICANT CHANGE UP (ref 13–44)
MAGNESIUM SERPL-MCNC: 2.4 MG/DL — SIGNIFICANT CHANGE UP (ref 1.6–2.6)
MCHC RBC-ENTMCNC: 28.8 PG — SIGNIFICANT CHANGE UP (ref 27–34)
MCHC RBC-ENTMCNC: 31.6 GM/DL — LOW (ref 32–36)
MCV RBC AUTO: 91.2 FL — SIGNIFICANT CHANGE UP (ref 80–100)
MONOCYTES # BLD AUTO: 0.62 K/UL — SIGNIFICANT CHANGE UP (ref 0–0.9)
MONOCYTES NFR BLD AUTO: 6.9 % — SIGNIFICANT CHANGE UP (ref 2–14)
NEUTROPHILS # BLD AUTO: 5.49 K/UL — SIGNIFICANT CHANGE UP (ref 1.8–7.4)
NEUTROPHILS NFR BLD AUTO: 60.8 % — SIGNIFICANT CHANGE UP (ref 43–77)
PLATELET # BLD AUTO: 331 K/UL — SIGNIFICANT CHANGE UP (ref 150–400)
POTASSIUM SERPL-MCNC: 4 MMOL/L — SIGNIFICANT CHANGE UP (ref 3.5–5.3)
POTASSIUM SERPL-SCNC: 4 MMOL/L — SIGNIFICANT CHANGE UP (ref 3.5–5.3)
RBC # BLD: 4.89 M/UL — SIGNIFICANT CHANGE UP (ref 3.8–5.2)
RBC # FLD: 12.3 % — SIGNIFICANT CHANGE UP (ref 10.3–14.5)
SODIUM SERPL-SCNC: 139 MMOL/L — SIGNIFICANT CHANGE UP (ref 135–145)
WBC # BLD: 9.02 K/UL — SIGNIFICANT CHANGE UP (ref 3.8–10.5)
WBC # FLD AUTO: 9.02 K/UL — SIGNIFICANT CHANGE UP (ref 3.8–10.5)

## 2023-05-24 PROCEDURE — C1766: CPT

## 2023-05-24 PROCEDURE — 86900 BLOOD TYPING SEROLOGIC ABO: CPT

## 2023-05-24 PROCEDURE — 93005 ELECTROCARDIOGRAM TRACING: CPT

## 2023-05-24 PROCEDURE — 86901 BLOOD TYPING SEROLOGIC RH(D): CPT

## 2023-05-24 PROCEDURE — 93580 TRANSCATH CLOSURE OF ASD: CPT

## 2023-05-24 PROCEDURE — 93662 INTRACARDIAC ECG (ICE): CPT | Mod: 26

## 2023-05-24 PROCEDURE — 93662 INTRACARDIAC ECG (ICE): CPT

## 2023-05-24 PROCEDURE — 86850 RBC ANTIBODY SCREEN: CPT

## 2023-05-24 PROCEDURE — 93306 TTE W/DOPPLER COMPLETE: CPT | Mod: 26

## 2023-05-24 PROCEDURE — 85025 COMPLETE CBC W/AUTO DIFF WBC: CPT

## 2023-05-24 PROCEDURE — 83735 ASSAY OF MAGNESIUM: CPT

## 2023-05-24 PROCEDURE — C1889: CPT

## 2023-05-24 PROCEDURE — 99152 MOD SED SAME PHYS/QHP 5/>YRS: CPT

## 2023-05-24 PROCEDURE — C8929: CPT

## 2023-05-24 PROCEDURE — 93010 ELECTROCARDIOGRAM REPORT: CPT

## 2023-05-24 PROCEDURE — 93010 ELECTROCARDIOGRAM REPORT: CPT | Mod: 77

## 2023-05-24 PROCEDURE — C1894: CPT

## 2023-05-24 PROCEDURE — 36415 COLL VENOUS BLD VENIPUNCTURE: CPT

## 2023-05-24 PROCEDURE — C1769: CPT

## 2023-05-24 PROCEDURE — C1759: CPT

## 2023-05-24 PROCEDURE — 84702 CHORIONIC GONADOTROPIN TEST: CPT

## 2023-05-24 PROCEDURE — C1887: CPT

## 2023-05-24 PROCEDURE — 80048 BASIC METABOLIC PNL TOTAL CA: CPT

## 2023-05-24 RX ORDER — ACETAMINOPHEN 500 MG
1000 TABLET ORAL ONCE
Refills: 0 | Status: COMPLETED | OUTPATIENT
Start: 2023-05-24 | End: 2023-05-24

## 2023-05-24 RX ORDER — ASPIRIN/CALCIUM CARB/MAGNESIUM 324 MG
81 TABLET ORAL DAILY
Refills: 0 | Status: DISCONTINUED | OUTPATIENT
Start: 2023-05-25 | End: 2023-06-07

## 2023-05-24 RX ORDER — ASPIRIN/CALCIUM CARB/MAGNESIUM 324 MG
1 TABLET ORAL
Qty: 30 | Refills: 6
Start: 2023-05-24 | End: 2023-12-19

## 2023-05-24 RX ORDER — SODIUM CHLORIDE 9 MG/ML
250 INJECTION INTRAMUSCULAR; INTRAVENOUS; SUBCUTANEOUS ONCE
Refills: 0 | Status: COMPLETED | OUTPATIENT
Start: 2023-05-24 | End: 2023-05-24

## 2023-05-24 RX ORDER — CLOPIDOGREL BISULFATE 75 MG/1
75 TABLET, FILM COATED ORAL DAILY
Refills: 0 | Status: DISCONTINUED | OUTPATIENT
Start: 2023-05-25 | End: 2023-06-07

## 2023-05-24 RX ORDER — METFORMIN HYDROCHLORIDE 850 MG/1
1 TABLET ORAL
Qty: 0 | Refills: 0 | DISCHARGE

## 2023-05-24 RX ORDER — SEMAGLUTIDE 0.68 MG/ML
0.5 INJECTION, SOLUTION SUBCUTANEOUS
Refills: 0 | DISCHARGE

## 2023-05-24 RX ORDER — DEXTROSE 50 % IN WATER 50 %
25 SYRINGE (ML) INTRAVENOUS ONCE
Refills: 0 | Status: DISCONTINUED | OUTPATIENT
Start: 2023-05-24 | End: 2023-06-07

## 2023-05-24 RX ORDER — ASPIRIN/CALCIUM CARB/MAGNESIUM 324 MG
81 TABLET ORAL ONCE
Refills: 0 | Status: COMPLETED | OUTPATIENT
Start: 2023-05-24 | End: 2023-05-24

## 2023-05-24 RX ORDER — CLOPIDOGREL BISULFATE 75 MG/1
1 TABLET, FILM COATED ORAL
Qty: 30 | Refills: 6
Start: 2023-05-24 | End: 2023-12-19

## 2023-05-24 RX ORDER — DEXTROSE 50 % IN WATER 50 %
12.5 SYRINGE (ML) INTRAVENOUS ONCE
Refills: 0 | Status: DISCONTINUED | OUTPATIENT
Start: 2023-05-24 | End: 2023-06-07

## 2023-05-24 RX ORDER — DEXTROSE 50 % IN WATER 50 %
15 SYRINGE (ML) INTRAVENOUS ONCE
Refills: 0 | Status: DISCONTINUED | OUTPATIENT
Start: 2023-05-24 | End: 2023-06-07

## 2023-05-24 RX ORDER — LISINOPRIL 2.5 MG/1
5 TABLET ORAL DAILY
Refills: 0 | Status: DISCONTINUED | OUTPATIENT
Start: 2023-05-25 | End: 2023-06-07

## 2023-05-24 RX ORDER — INSULIN LISPRO 100/ML
VIAL (ML) SUBCUTANEOUS
Refills: 0 | Status: DISCONTINUED | OUTPATIENT
Start: 2023-05-24 | End: 2023-06-07

## 2023-05-24 RX ORDER — PANTOPRAZOLE SODIUM 20 MG/1
1 TABLET, DELAYED RELEASE ORAL
Qty: 0 | Refills: 0 | DISCHARGE

## 2023-05-24 RX ORDER — GLUCAGON INJECTION, SOLUTION 0.5 MG/.1ML
1 INJECTION, SOLUTION SUBCUTANEOUS ONCE
Refills: 0 | Status: DISCONTINUED | OUTPATIENT
Start: 2023-05-24 | End: 2023-06-07

## 2023-05-24 RX ORDER — ASPIRIN/CALCIUM CARB/MAGNESIUM 324 MG
1 TABLET ORAL
Qty: 0 | Refills: 0 | DISCHARGE
Start: 2023-05-24

## 2023-05-24 RX ORDER — SODIUM CHLORIDE 9 MG/ML
1000 INJECTION, SOLUTION INTRAVENOUS
Refills: 0 | Status: DISCONTINUED | OUTPATIENT
Start: 2023-05-24 | End: 2023-06-07

## 2023-05-24 RX ORDER — VENLAFAXINE HCL 75 MG
75 CAPSULE, EXT RELEASE 24 HR ORAL DAILY
Refills: 0 | Status: DISCONTINUED | OUTPATIENT
Start: 2023-05-25 | End: 2023-06-07

## 2023-05-24 RX ADMIN — SODIUM CHLORIDE 250 MILLILITER(S): 9 INJECTION INTRAMUSCULAR; INTRAVENOUS; SUBCUTANEOUS at 16:21

## 2023-05-24 RX ADMIN — Medication 400 MILLIGRAM(S): at 17:27

## 2023-05-24 RX ADMIN — Medication 81 MILLIGRAM(S): at 10:24

## 2023-05-24 RX ADMIN — Medication 1000 MILLIGRAM(S): at 22:52

## 2023-05-24 RX ADMIN — SODIUM CHLORIDE 250 MILLILITER(S): 9 INJECTION INTRAMUSCULAR; INTRAVENOUS; SUBCUTANEOUS at 10:24

## 2023-05-24 NOTE — DISCHARGE NOTE PROVIDER - HOSPITAL COURSE
HPI:   53F w/ hx of R cerebellar lacunar infarct,  TIA, PFO, PCOM/LT MCA aneurysm (noted in 2021), s/p ILR in 2021, HTN, T2DM; +cocaine use who presents for a follow up visit via telemedicine. She presented to [facility] in March 15th 2023 with c/o dizziness; blurred vision and difficulty with ambulation. Utox- positive for cocaine. MRI head showed Chronic small right cerebellar hemisphere infarct. No acute infarction, also noted incidental 1-2mm left PCOMM and left MCA bifurcation aneurysms. TTE showed LVEF 55-60%, mildly enlarged RV, normal RV function, normal LA, trace MR, sclerotic AV with normal opening. She had a CATY for similar symptoms in November 2021 which revealed EF 55-60%; and moderate sized PFO with bidirectional shunting across atrial septum.  She did not follow up with PFO closure in 2021 due to no insurance. ILR interrogated in hospital- no AF noted. Pt also had Infectious diarrhea, GI PCR + for Rotavirus and CT A/P- No evidence of acute inflammatory or obstructive process in the abdomen and pelvis. Now presents for PFO closure     Heart Failure: No       Systolic/Diastolic/Combined: n/a       NYHA Class (within 2 weeks):     Assessment of LVEF (Must be within 6 months):       EF: 55-60%       Assessed by: TTE       Date: 3/18/23    Echo (Date, Findings):   < from: TTE Echo Complete w/ Contrast w/ Doppler (03.18.23 @ 10:13) >  Summary:   1. Left ventricular ejection fraction, by visual estimation, is 55 to   60%.   2. Normal global left ventricular systolic function.   3. Spectral Doppler shows impaired relaxation pattern of left   ventricular myocardial filling (Grade I diastolic dysfunction).    CATY 11/2021:  < from: CATY Echo Doppler (11.23.21 @ 17:02) >    Summary:   1. Left ventricular ejection fraction, by visual estimation, is 60 to 65%.   2. Normal global left ventricular systolic function.   3. Normal right ventricular size and function, inadequate estimation of RVSP.   4. Mildly enlarged left atrium.   5. No left atrial appendage thrombusand normal left atrial appendage velocities.   6. Moderately sized patent foramen ovale, with bidirectional shunting across atrial septum, R-to-L shunting during episode of sleep apnea/increased intra-thoracic pressure.   7. Mobile intra-atrial septum borderline aneurysmal and lipomatous hypertrophy.   8. There is no evidence of pericardial effusion.    Renny Ramirez DO Electronically signed on 11/23/2021 at 5:29:12 PM    < end of copied text >    Now s/p PFO Closure via RFV with Dr. Zhang Simon, tolerated procedure well. Pt arrived to recovery in NAD and HDS, RFV access site stable with figure 8 suture in place, no bleed/hematoma, distal pulse +2, RLE remains acyanotic, warm to touch; palpable distal pulse  Intraprocedurally: Cefazolin 2g IV; Midazolam 1mg IV; Fentanyl 50mcg IV; Lidocaine 1% 10ml; Heparin 10,000 units IV; Clopidogrel 300mg PO x1  Findings: s/p PFO Closure with 30mm Amplatz Closure Device (PRELIMINARY REPORT; PENDING OFFICIAL REPORT)  Post Cath EKG: NSR    Plan:  -Formal cath report pending  -Post procedure management/monitoring per protocol  -Access site precautions  -HOB flat x 2 hours. If RFV site stable, ok to raise HOB <30 degrees at 230 pm.   -Bedrest x 6 hours post procedure. Ok to ambulate after figure 8 suture removal  -NS 0.9% 250ml/hr x 1 bolus: post procedure CONCHITA ppx   -Repeat ECG if any clinical indication or change on tele  -Obtain TTE this evening 3 hours after procedure (echo ordered)  -Continue current medical therapy  -s/p clopidogrel load 300mg PO x1 in lab  -Dual anti platelet therapy with aspirin/plavix  -Educated regarding strict adherence with DAPT x 6 months.   -Educated regarding post procedure management and care  -Discussed the importance of RF modification  -F/U outpt in 1-2 weeks with Cardiologist Dr. Zhang Simon  -DISPO: Possible discharge home this evening if echo is obtained/ read with no effusion and patient remains hemodynamically stable, and RFV site stable.   -Patient instructed to hold metformin x 48 hours. Ok to resume Saturday 5/27 AM   HPI:   53F w/ hx of R cerebellar lacunar infarct,  TIA, PFO, PCOM/LT MCA aneurysm (noted in 2021), s/p ILR in 2021, HTN, T2DM; +cocaine use who presents for a follow up visit via telemedicine. She presented to [facility] in March 15th 2023 with c/o dizziness; blurred vision and difficulty with ambulation. Utox- positive for cocaine. MRI head showed Chronic small right cerebellar hemisphere infarct. No acute infarction, also noted incidental 1-2mm left PCOMM and left MCA bifurcation aneurysms. TTE showed LVEF 55-60%, mildly enlarged RV, normal RV function, normal LA, trace MR, sclerotic AV with normal opening. She had a CATY for similar symptoms in November 2021 which revealed EF 55-60%; and moderate sized PFO with bidirectional shunting across atrial septum.  She did not follow up with PFO closure in 2021 due to no insurance. ILR interrogated in hospital- no AF noted. Pt also had Infectious diarrhea, GI PCR + for Rotavirus and CT A/P- No evidence of acute inflammatory or obstructive process in the abdomen and pelvis. Now presents for PFO closure     Heart Failure: No       Systolic/Diastolic/Combined: n/a       NYHA Class (within 2 weeks):     Assessment of LVEF (Must be within 6 months):       EF: 55-60%       Assessed by: TTE       Date: 3/18/23    Echo (Date, Findings):   < from: TTE Echo Complete w/ Contrast w/ Doppler (03.18.23 @ 10:13) >  Summary:   1. Left ventricular ejection fraction, by visual estimation, is 55 to   60%.   2. Normal global left ventricular systolic function.   3. Spectral Doppler shows impaired relaxation pattern of left   ventricular myocardial filling (Grade I diastolic dysfunction).    CATY 11/2021:  < from: CATY Echo Doppler (11.23.21 @ 17:02) >    Summary:   1. Left ventricular ejection fraction, by visual estimation, is 60 to 65%.   2. Normal global left ventricular systolic function.   3. Normal right ventricular size and function, inadequate estimation of RVSP.   4. Mildly enlarged left atrium.   5. No left atrial appendage thrombusand normal left atrial appendage velocities.   6. Moderately sized patent foramen ovale, with bidirectional shunting across atrial septum, R-to-L shunting during episode of sleep apnea/increased intra-thoracic pressure.   7. Mobile intra-atrial septum borderline aneurysmal and lipomatous hypertrophy.   8. There is no evidence of pericardial effusion.    Renny Ramirez DO Electronically signed on 11/23/2021 at 5:29:12 PM    < end of copied text >    Now s/p PFO Closure via RFV with Dr. Zhang Simon, tolerated procedure well. Pt arrived to recovery in NAD and HDS, RFV access site stable with figure 8 suture in place, no bleed/hematoma, distal pulse +2, RLE remains acyanotic, warm to touch; palpable distal pulse  Intraprocedurally: Cefazolin 2g IV; Midazolam 1mg IV; Fentanyl 50mcg IV; Lidocaine 1% 10ml; Heparin 10,000 units IV; Clopidogrel 300mg PO x1  Findings: s/p PFO Closure with 30mm Amplatz Closure Device (PRELIMINARY REPORT; PENDING OFFICIAL REPORT)  Post Cath EKG: NSR    Plan:  -Formal cath report pending  -Post procedure management/monitoring per protocol  -Access site precautions  -HOB flat x 2 hours. If RFV site stable, ok to raise HOB <30 degrees at 230 pm.   -Bedrest x 6 hours post procedure. Ok to ambulate after figure 8 suture removal  -NS 0.9% 250ml/hr x 1 bolus: post procedure CONCHITA ppx   -Repeat ECG if any clinical indication or change on tele  -Obtain TTE this evening 3 hours after procedure (echo ordered)  -Continue current medical therapy  -s/p clopidogrel load 300mg PO x1 in lab  -Dual anti platelet therapy with aspirin/plavix  -Educated regarding strict adherence with DAPT x 6 months.   -Educated regarding post procedure management and care  -Discussed the importance of RF modification  -F/U outpt in 1-2 weeks with Cardiologist Dr. Zhang Simon  -DISPO: Possible discharge home this evening if echo is obtained/ read with no effusion and patient remains hemodynamically stable, and RFV site stable.   -Patient instructed to hold metformin x 48 hours. Ok to resume Saturday 5/27 AM    < from: TTE Echo Complete w/ Contrast w/ Doppler (05.24.23 @ 17:23) >    Summary:   1. Technically difficult study.   2. Endocardial visualization was enhanced with intravenous echo contrast.   3. There is mild concentric left ventricular hypertrophy.   4. Normal global left ventricular systolic function.   5. Left ventricular ejection fraction, by visual estimation, is 55 to   60%.   6. Mildly enlarged right ventricle.   7. The left atrium is normal in size.   8. The right atrium is normal in size.   9. Sclerotic aortic valve with normal opening.  10. Trace mitral valve regurgitation.  11. Normal pulmonary artery pressure.  12. There is no evidence of pericardial effusion.  13. Patient is s/p ASD closure, device visualized without clear evidence   of shunting but cannot exclude due to difficult windows.    < end of copied text >

## 2023-05-24 NOTE — DISCHARGE NOTE PROVIDER - NSDCCPTREATMENT_GEN_ALL_CORE_FT
PRINCIPAL PROCEDURE  Procedure: Transcatheter closure of patent foramen ovale (PFO)  Findings and Treatment: -You had a PFO Closure with Dr. Simon today on 5/24/23. Dr. Simon accessed your right femoral vein during the procedure. That is the vein in your right groin.   -Please continue to monitor your right groin when you go home. If you develop any bleeding, lay down where you are and apply direct firm pressure until the bleeding stops. If the bleeding is excessive, please call 911.   -If heavy bleeding or large lumps form, hold pressure at the spot and come to the Emergency Room.  -No submerging the arm in water for 48 hours. This includes hot tubs, swimming pools and jacuzzis.  You may start showering tomorrow on 5/25. Prior to showering, remove dressing from right groin. Shower with warm water and soap. Pat dry with towel. You may place a bandaid over the site. change the bandaid every day.   -no heavy lifting for 48 hours. No heavy lifting greater than 10lbs.  -You may walk indoors/ outdoors as tolerated. No strenuous exercise, gym, sports or heavy lifting x3 days. Avoid stair climbing x 3 days.  -Please return to nearest ED if you develop any fever, chills, drainage from the incision site, or any change of temperature, color, sensation of the affected extremity.  -Call your doctor for any bleeding, swelling, loss of sensation in the foot or leg, or toes turning blue.   -Please return to nearest ED if you develop any chest pain, chest pressure, shortness of breath, jaw pain, pain radiating down the arm, palpitations, dizziness, palpitations, abdominal complaints including abdominal pain, nausea and vomiting.   -Please follow up with your cardiologist in 1-2 weeks

## 2023-05-24 NOTE — DISCHARGE NOTE PROVIDER - NSDCMRMEDTOKEN_GEN_ALL_CORE_FT
clopidogrel 75 mg oral tablet: 1 tab(s) orally once a day  lisinopril 5 mg oral tablet: 1 tab(s) orally once a day  metFORMIN 500 mg oral tablet: 1 tab(s) orally 2 times a day  Ozempic 2 mg/1.5 mL (0.25 mg or 0.5 mg dose) subcutaneous solution: 0.5 subcutaneously once a week sunday  venlafaxine 75 mg oral capsule, extended release: 1 cap(s) orally once a day   aspirin 81 mg oral tablet, chewable: 1 tab(s) orally once a day  clopidogrel 75 mg oral tablet: 1 tab(s) orally once a day  lisinopril 5 mg oral tablet: 1 tab(s) orally once a day  metFORMIN 500 mg oral tablet: 1 tab(s) orally 2 times a day PLEASE HOLD METFORMIN x 48 HOURS. YOU MAY RESUME ON SATURDAY 5/27 AM  Ozempic 2 mg/1.5 mL (0.25 mg or 0.5 mg dose) subcutaneous solution: 0.5 subcutaneously once a week sunday  venlafaxine 75 mg oral capsule, extended release: 1 cap(s) orally once a day

## 2023-05-24 NOTE — PROGRESS NOTE ADULT - ASSESSMENT
Now s/p PFO Closure via RFV with Dr. Zhang Simon, tolerated procedure well. Pt arrived to recovery in NAD and HDS, RFV access site stable with figure 8 suture in place, no bleed/hematoma, distal pulse +2, RLE remains acyanotic, warm to touch; palpable distal pulse  Intraprocedurally: Cefazolin 2g IV; Midazolam 1mg IV; Fentanyl 50mcg IV; Lidocaine 1% 10ml; Heparin 10,000 units IV; Clopidogrel 300mg PO x1  Findings: s/p PFO Closure with 30mm Amplatz Closure Device (PRELIMINARY REPORT; PENDING OFFICIAL REPORT)  Post Cath EKG: NSR    Plan:  -Formal cath report pending  -Post procedure management/monitoring per protocol  -Access site precautions  -HOB flat x 2 hours. If RFV site stable, ok to raise HOB <30 degrees at 230 pm.   -Bedrest x 6 hours post procedure. Ok to ambulate after figure 8 suture removal  -NS 0.9% 250ml/hr x 1 bolus: post procedure CONCHITA ppx   -Repeat ECG if any clinical indication or change on tele  -Obtain TTE this evening 3 hours after procedure (echo ordered)  -Continue current medical therapy  -s/p clopidogrel load 300mg PO x1 in lab  -Dual anti platelet therapy with aspirin/plavix  -Educated regarding strict adherence with DAPT x 6 months.   -Educated regarding post procedure management and care  -Discussed the importance of RF modification  -F/U outpt in 1-2 weeks with Cardiologist Dr. Zhang Simon  -DISPO: Possible discharge home this evening if echo is obtained/ read with no effusion and patient remains hemodynamically stable, and RFV site stable.   -Patient instructed to hold metformin x 48 hours. Ok to resume Saturday 5/27 AM

## 2023-05-24 NOTE — CHART NOTE - NSCHARTNOTEFT_GEN_A_CORE
Figure 8 Suture removed from RFV. Manual pressure held x 5 minutes. No active bleeding, no groin hematoma noted. RLE remains acyanotic, warm to touch; motor/sensory function intact. Slight tenderness to right groin site which improved after suture removal.    PLAN:  -Monitor groin site q 15 minutes  -Notify cardiology ACP immediately if any active bleeding or groin hematoma.  -Maintain supine position with RLE straight and HOB flat x 1 hour. If RFV site stable, ok to sit HOB to 30 degrees in 1 hour at 630 pm  -bedrest x 2 hours. If RFV site stable, ok to ambulate in 2 hours at 730 pm.  -spoke with Dr. Jin to read echo prior to discharge home this evening.

## 2023-05-24 NOTE — DISCHARGE NOTE NURSING/CASE MANAGEMENT/SOCIAL WORK - PATIENT PORTAL LINK FT
You can access the FollowMyHealth Patient Portal offered by [facility] by registering at the following website: [email or web address]. By joining [employer]’s FollowMyHealth portal, you will also be able to view your health information using other applications (apps) compatible with our system.

## 2023-05-24 NOTE — DISCHARGE NOTE PROVIDER - NSDCCPCAREPLAN_GEN_ALL_CORE_FT
PRINCIPAL DISCHARGE DIAGNOSIS  Diagnosis: PFO (patent foramen ovale)  Assessment and Plan of Treatment: -Patent Foramen Ovale (PFO) is a hole between the left and right atria (upper chamrs) of the heart. This hole exists in everyone before birth, but most often closes shortly after being born. PFO is what the hole is called when it fails to close naturally after a baby is born.  -You had a PFO Closure today 5/24 with Dr. Zhang Simon.   -Please continue your aspirin 81mg and plavix 75mg daily.  -Please follow up with your cardiologist in 1-2 weeks.   -Please return to the nearest ER if you develop any chest pain; chest pressure; shortness of breath; palpitations; dizziness or passing out.  -Please do not take your metformin x 48 hours. You may resume on Saturday 5/27 AM.

## 2023-05-24 NOTE — ASU PATIENT PROFILE, ADULT - FALL HARM RISK - HARM RISK INTERVENTIONS

## 2023-05-24 NOTE — PROGRESS NOTE ADULT - SUBJECTIVE AND OBJECTIVE BOX
[facility]                                              INTERVENTIONAL CARDIOLOGY AT [address], [place] [address]                                             Telephone: [phone]. Fax:[phone]                                                       INTERVENTIONAL CARDIOLOGY CONSULTATION NOTE                                                                                             History obtained by: Patient and medical record  Community Cardiologist: Dr. Zhang Simon  Reason for Consultation: Evaluation for cardiac catheterization  Available pt records reviewed: Yes [ x ] No [  ]    Chief complaint:    Patient is a 53y old  Female who presents with a chief complaint of PFO closure (23 May 2023 17:47)      HPI:  HPI:   53F w/ hx of R cerebellar lacunar infarct,  TIA, PFO, PCOM/LT MCA aneurysm (noted in ), s/p ILR in , HTN, T2DM; +cocaine use who presents for a follow up visit via telemedicine. She presented to [facility] in 2023 with c/o dizziness; blurred vision and difficulty with ambulation. Utox- positive for cocaine. MRI head showed Chronic small right cerebellar hemisphere infarct. No acute infarction, also noted incidental 1-2mm left PCOMM and left MCA bifurcation aneurysms. TTE showed LVEF 55-60%, mildly enlarged RV, normal RV function, normal LA, trace MR, sclerotic AV with normal opening. She had a CATY for similar symptoms in 2021 which revealed EF 55-60%; and moderate sized PFO with bidirectional shunting across atrial septum.  She did not follow up with PFO closure in  due to no insurance. ILR interrogated in hospital- no AF noted. Pt also had Infectious diarrhea, GI PCR + for Rotavirus and CT A/P- No evidence of acute inflammatory or obstructive process in the abdomen and pelvis. Now presents for PFO closure     Heart Failure: No       Systolic/Diastolic/Combined: n/a       NYHA Class (within 2 weeks):     Assessment of LVEF (Must be within 6 months):       EF: 55-60%       Assessed by: TTE       Date: 3/18/23    Echo (Date, Findings):   < from: TTE Echo Complete w/ Contrast w/ Doppler (23 @ 10:13) >  Summary:   1. Left ventricular ejection fraction, by visual estimation, is 55 to   60%.   2. Normal global left ventricular systolic function.   3. Spectral Doppler shows impaired relaxation pattern of left   ventricular myocardial filling (Grade I diastolic dysfunction).    CATY 2021:  < from: CATY Echo Doppler (21 @ 17:02) >    Summary:   1. Left ventricular ejection fraction, by visual estimation, is 60 to 65%.   2. Normal global left ventricular systolic function.   3. Normal right ventricular size and function, inadequate estimation of RVSP.   4. Mildly enlarged left atrium.   5. No left atrial appendage thrombusand normal left atrial appendage velocities.   6. Moderately sized patent foramen ovale, with bidirectional shunting across atrial septum, R-to-L shunting during episode of sleep apnea/increased intra-thoracic pressure.   7. Mobile intra-atrial septum borderline aneurysmal and lipomatous hypertrophy.   8. There is no evidence of pericardial effusion.    Renny Ramirez DO Electronically signed on 2021 at 5:29:12 PM    < end of copied text >     (23 May 2023 17:47)    PAST MEDICAL HISTORY  HTN (hypertension)    HLD (hyperlipidemia)    Diabetes    History of cerebral aneurysm        Associated Risk Factors:        Frailty Assessment: (none/mild/mod/severe): none       Cerebrovascular Disease: N/A       Chronic Lung Disease: N/A       Peripheral Arterial Disease: N/A       Chronic Kidney Disease (if yes, what is GFR): N/A       Uncontrolled Diabetes (if yes, what is HgbA1C or FBS): N/A       Poorly Controlled Hypertension (if yes, what is SBP): N/A       Morbid Obesity (if yes, what is BMI): N/A       History of Recent Ventricular Arrhythmia: N/A       Inability to Ambulate Safely: N/A       Need for Therapeutic Anticoagulation: N/A       Antiplatelet or Contrast Allergy: N/A      PAST SURGICAL HISTORY  No significant past surgical history    H/O:         SOCIAL HISTORY: Lives at home with her , no active smoker; denies former smoking history; occasional etoh use (monthly); current cocaine use 1-2x per month (last use was 2-3 weeks ago)    FAMILY HISTORY:  Family history of hypertension in mother  she is alive 85 yo    Family history of coronary artery disease (Father)  father  at age 64 from MI      Family History of Premature Cardiovascular Disease:  Yes [  ] No [  ]    HOME MEDICATIONS:  lisinopril 5 mg oral tablet: 1 tab(s) orally once a day (24 May 2023 09:42)  metFORMIN 500 mg oral tablet: 1 tab(s) orally 2 times a day (24 May 2023 09:42)  Ozempic 2 mg/1.5 mL (0.25 mg or 0.5 mg dose) subcutaneous solution: 0.5 subcutaneously once a week  (24 May 2023 09:43)  venlafaxine 75 mg oral capsule, extended release: 1 cap(s) orally once a day (24 May 2023 09:42)    ALLERGIES:   No Known Allergies      REVIEW OF SYMPTOMS:   CONSTITUTIONAL: no fever, no chills, no weight loss, no weight gain, no fatigue   CARDIOVASCULAR: no active chest pain, chest pressure, shortness of breath, palpitations, fatigue or leg edema  RESPIRATORY: no Shortness of breath, no cough, no wheezing  : No dysuria, no hematuria   GI: No dark color stool, no nausea, no diarrhea, no constipation, no abdominal pain   NEURO: intermittent dizziness, No headache, no slurred speech   ALL OTHER REVIEW OF SYSTEMS ARE NEGATIVE.    VITAL SIGNS:  T(C): 36.4 (23 @ 09:50), Max: 36.4 (23 @ 09:50)  T(F): 97.5 (23 @ 09:50), Max: 97.5 (23 @ 09:50)  HR: 78 (23 @ 09:50) (78 - 78)  BP: 133/85 (23 @ 09:50) (133/85 - 133/85)  RR: 16 (23 @ 09:50) (16 - 16)  SpO2: 97% (23 @ 09:50) (97% - 97%)    INTAKE AND OUTPUT:       PHYSICAL EXAM:  Constitutional: Comfortable . No acute distress.   HEENT: Atraumatic and normocephalic , neck is supple . no JVD. No carotid bruit.  CNS: A&Ox3. slight right facial droop otherwise exam nonfocal  Respiratory: CTAB, unlabored   Cardiovascular: RRR normal s1 s2. No murmur. No rubs or gallop.  Gastrointestinal: Soft, non-tender. +Bowel sounds.   Extremities: 2+ Peripheral Pulses, No clubbing, cyanosis, or edema  Psychiatric: Calm . no agitation.   Skin: Warm and dry, no ulcers on extremities     LABS:                            14.1   9.02  )-----------( 331      ( 24 May 2023 09:49 )             44.6         139  |  101  |  11.4  ----------------------------<  109<H>  4.0   |  30.0<H>  |  0.57    Ca    9.0      24 May 2023 09:49  Mg     2.4             ECG: NSR  Prior ECG: Yes [ X ] No [  ]    CARDIAC TESTING   ECHO:   < from: TTE Echo Complete w/ Contrast w/ Doppler (23 @ 10:13) >  Summary:   1. Left ventricular ejection fraction, by visual estimation, is 55 to   60%.   2. Normal global left ventricular systolic function.   3. Spectral Doppler shows impaired relaxation pattern of left   ventricular myocardial filling (Grade I diastolic dysfunction).   4. Mildly enlarged right ventricle, with normal RV function (TAPSE   2.14cm).   5. The left atrium is normal in size.   6. Trace mitral valve regurgitation.   7. Lipomatous hypertrophy of the intra-atrial septum and mobile septum.   8. Sclerotic aortic valve with normal opening.   9. There is no evidence of pericardial effusion.    Ansley Juarez MD Electronically signed on 3/18/2023 at 11:47:06 AM    < end of copied text >        CATY:  < from: CATY Echo Doppler (21 @ 17:02) >    Summary:   1. Left ventricular ejection fraction, by visual estimation, is 60 to 65%.   2. Normal global left ventricular systolic function.   3. Normal right ventricular size and function, inadequate estimation of RVSP.   4. Mildly enlarged left atrium.   5. No left atrial appendage thrombusand normal left atrial appendage velocities.   6. Moderately sized patent foramen ovale, with bidirectional shunting across atrial septum, R-to-L shunting during episode of sleep apnea/increased intra-thoracic pressure.   7. Mobile intra-atrial septum borderline aneurysmal and lipomatous hypertrophy.   8. There is no evidence of pericardial effusion.    Renny Ramirez DO Electronically signed on 2021 at 5:29:12 PM        *** Final ***        < end of copied text >        ELECTROPHYSIOLOGY: ILR was interrogated 3/16/23 revealing 0% AT / AF appreciated    Cardiac Cath Risk Assessments:  ASA: 3  Mallampati: 2  Bleeding Risk: 1.0%  Creatinine: 0.57  GFR: 108    Plan:  -plan for PFO Closure  -preferred access: RFV   -confirmed appropriate NPO duration  -ECG and Labs reviewed  -Aspirin 81mg po pre-cath; patient took clopidogrel 75mg PO prior to hospital arrival  -Normal Saline 0.9%  250ml/hr IV: pre procedure CONCHITA ppx   -procedure discussed with patient; risks and benefits explained, questions answered  -consent obtained by attending   -language line solutions  patrizia ID [id number] used for interpretation and consent  -Type and screen sent  -Serum pregnancy ordered

## 2023-05-24 NOTE — DISCHARGE NOTE PROVIDER - NSDCFUSCHEDAPPT_GEN_ALL_CORE_FT
Oscar Hernández  [facility]  NEUROLOGY [address]  Scheduled Appointment: 06/06/2023    [facility]  ELECTROPH [address]  Scheduled Appointment: 06/22/2023

## 2023-05-26 PROBLEM — Z86.79 PERSONAL HISTORY OF OTHER DISEASES OF THE CIRCULATORY SYSTEM: Chronic | Status: ACTIVE | Noted: 2023-05-24

## 2023-05-27 ENCOUNTER — EMERGENCY (EMERGENCY)
Facility: HOSPITAL | Age: 53
LOS: 1 days | Discharge: HOME | End: 2023-05-27
Attending: EMERGENCY MEDICINE
Payer: COMMERCIAL

## 2023-05-27 VITALS
SYSTOLIC BLOOD PRESSURE: 105 MMHG | TEMPERATURE: 98 F | DIASTOLIC BLOOD PRESSURE: 71 MMHG | HEART RATE: 92 BPM | RESPIRATION RATE: 18 BRPM | OXYGEN SATURATION: 98 %

## 2023-05-27 VITALS
TEMPERATURE: 98 F | DIASTOLIC BLOOD PRESSURE: 82 MMHG | WEIGHT: 229.94 LBS | HEART RATE: 95 BPM | OXYGEN SATURATION: 98 % | RESPIRATION RATE: 20 BRPM | HEIGHT: 66.14 IN | SYSTOLIC BLOOD PRESSURE: 108 MMHG

## 2023-05-27 DIAGNOSIS — Z98.891 HISTORY OF UTERINE SCAR FROM PREVIOUS SURGERY: Chronic | ICD-10-CM

## 2023-05-27 LAB
ALBUMIN SERPL ELPH-MCNC: 3.9 G/DL — SIGNIFICANT CHANGE UP (ref 3.3–5.2)
ALP SERPL-CCNC: 126 U/L — HIGH (ref 40–120)
ALT FLD-CCNC: 26 U/L — SIGNIFICANT CHANGE UP
ANION GAP SERPL CALC-SCNC: 10 MMOL/L — SIGNIFICANT CHANGE UP (ref 5–17)
APPEARANCE UR: ABNORMAL
APTT BLD: 30.7 SEC — SIGNIFICANT CHANGE UP (ref 27.5–35.5)
AST SERPL-CCNC: 20 U/L — SIGNIFICANT CHANGE UP
BACTERIA # UR AUTO: ABNORMAL
BASOPHILS # BLD AUTO: 0.05 K/UL — SIGNIFICANT CHANGE UP (ref 0–0.2)
BASOPHILS NFR BLD AUTO: 0.5 % — SIGNIFICANT CHANGE UP (ref 0–2)
BILIRUB SERPL-MCNC: <0.2 MG/DL — LOW (ref 0.4–2)
BILIRUB UR-MCNC: ABNORMAL
BUN SERPL-MCNC: 30.3 MG/DL — HIGH (ref 8–20)
CALCIUM SERPL-MCNC: 9.5 MG/DL — SIGNIFICANT CHANGE UP (ref 8.4–10.5)
CHLORIDE SERPL-SCNC: 102 MMOL/L — SIGNIFICANT CHANGE UP (ref 96–108)
CO2 SERPL-SCNC: 28 MMOL/L — SIGNIFICANT CHANGE UP (ref 22–29)
COLOR SPEC: YELLOW — SIGNIFICANT CHANGE UP
CREAT SERPL-MCNC: 0.93 MG/DL — SIGNIFICANT CHANGE UP (ref 0.5–1.3)
DIFF PNL FLD: ABNORMAL
EGFR: 73 ML/MIN/1.73M2 — SIGNIFICANT CHANGE UP
EOSINOPHIL # BLD AUTO: 0.22 K/UL — SIGNIFICANT CHANGE UP (ref 0–0.5)
EOSINOPHIL NFR BLD AUTO: 2.2 % — SIGNIFICANT CHANGE UP (ref 0–6)
EPI CELLS # UR: SIGNIFICANT CHANGE UP
GLUCOSE SERPL-MCNC: 122 MG/DL — HIGH (ref 70–99)
GLUCOSE UR QL: NEGATIVE MG/DL — SIGNIFICANT CHANGE UP
HCT VFR BLD CALC: 40.8 % — SIGNIFICANT CHANGE UP (ref 34.5–45)
HGB BLD-MCNC: 12.9 G/DL — SIGNIFICANT CHANGE UP (ref 11.5–15.5)
IMM GRANULOCYTES NFR BLD AUTO: 0.3 % — SIGNIFICANT CHANGE UP (ref 0–0.9)
INR BLD: 1.09 RATIO — SIGNIFICANT CHANGE UP (ref 0.88–1.16)
KETONES UR-MCNC: ABNORMAL
LEUKOCYTE ESTERASE UR-ACNC: ABNORMAL
LYMPHOCYTES # BLD AUTO: 2.62 K/UL — SIGNIFICANT CHANGE UP (ref 1–3.3)
LYMPHOCYTES # BLD AUTO: 25.8 % — SIGNIFICANT CHANGE UP (ref 13–44)
MCHC RBC-ENTMCNC: 29.1 PG — SIGNIFICANT CHANGE UP (ref 27–34)
MCHC RBC-ENTMCNC: 31.6 GM/DL — LOW (ref 32–36)
MCV RBC AUTO: 91.9 FL — SIGNIFICANT CHANGE UP (ref 80–100)
MONOCYTES # BLD AUTO: 0.94 K/UL — HIGH (ref 0–0.9)
MONOCYTES NFR BLD AUTO: 9.3 % — SIGNIFICANT CHANGE UP (ref 2–14)
NEUTROPHILS # BLD AUTO: 6.3 K/UL — SIGNIFICANT CHANGE UP (ref 1.8–7.4)
NEUTROPHILS NFR BLD AUTO: 61.9 % — SIGNIFICANT CHANGE UP (ref 43–77)
NITRITE UR-MCNC: NEGATIVE — SIGNIFICANT CHANGE UP
PH UR: 5 — SIGNIFICANT CHANGE UP (ref 5–8)
PLATELET # BLD AUTO: 245 K/UL — SIGNIFICANT CHANGE UP (ref 150–400)
POTASSIUM SERPL-MCNC: 4.3 MMOL/L — SIGNIFICANT CHANGE UP (ref 3.5–5.3)
POTASSIUM SERPL-SCNC: 4.3 MMOL/L — SIGNIFICANT CHANGE UP (ref 3.5–5.3)
PROT SERPL-MCNC: 7.3 G/DL — SIGNIFICANT CHANGE UP (ref 6.6–8.7)
PROT UR-MCNC: 30 MG/DL
PROTHROM AB SERPL-ACNC: 12.7 SEC — SIGNIFICANT CHANGE UP (ref 10.5–13.4)
RBC # BLD: 4.44 M/UL — SIGNIFICANT CHANGE UP (ref 3.8–5.2)
RBC # FLD: 12.5 % — SIGNIFICANT CHANGE UP (ref 10.3–14.5)
RBC CASTS # UR COMP ASSIST: ABNORMAL /HPF (ref 0–4)
SODIUM SERPL-SCNC: 140 MMOL/L — SIGNIFICANT CHANGE UP (ref 135–145)
SP GR SPEC: 1.03 — HIGH (ref 1.01–1.02)
UROBILINOGEN FLD QL: 1 MG/DL
WBC # BLD: 10.16 K/UL — SIGNIFICANT CHANGE UP (ref 3.8–10.5)
WBC # FLD AUTO: 10.16 K/UL — SIGNIFICANT CHANGE UP (ref 3.8–10.5)
WBC UR QL: SIGNIFICANT CHANGE UP /HPF (ref 0–5)

## 2023-05-27 PROCEDURE — 99284 EMERGENCY DEPT VISIT MOD MDM: CPT | Mod: 25

## 2023-05-27 PROCEDURE — 99284 EMERGENCY DEPT VISIT MOD MDM: CPT

## 2023-05-27 PROCEDURE — 85730 THROMBOPLASTIN TIME PARTIAL: CPT

## 2023-05-27 PROCEDURE — 80053 COMPREHEN METABOLIC PANEL: CPT

## 2023-05-27 PROCEDURE — 36415 COLL VENOUS BLD VENIPUNCTURE: CPT

## 2023-05-27 PROCEDURE — 85610 PROTHROMBIN TIME: CPT

## 2023-05-27 PROCEDURE — 74177 CT ABD & PELVIS W/CONTRAST: CPT | Mod: MA

## 2023-05-27 PROCEDURE — T1013: CPT

## 2023-05-27 PROCEDURE — 74177 CT ABD & PELVIS W/CONTRAST: CPT | Mod: 26,MA

## 2023-05-27 PROCEDURE — 81001 URINALYSIS AUTO W/SCOPE: CPT

## 2023-05-27 PROCEDURE — 85025 COMPLETE CBC W/AUTO DIFF WBC: CPT

## 2023-05-27 NOTE — ED PROVIDER NOTE - OBJECTIVE STATEMENT
53 year old female with PMH HTN, HLD, DM, and s/p PFO closure on 5/24 presents with abd pain. Pt states that beginning yesterday she began to have pelvic cramping pain and hematuria. She reports mild dysuria as well. No fevers, chills, vomiting, diarrhea, chest pain, SOB, leg pain.

## 2023-05-27 NOTE — ED PROVIDER NOTE - NSFOLLOWUPINSTRUCTIONS_ED_ALL_ED_FT
1. Follow up with your primary care physician within 2-3days for reevaluation.  2.  Return to the Emergency Department immediately for any worsening, progressive or concerning symptoms.

## 2023-05-27 NOTE — ED PROVIDER NOTE - CLINICAL SUMMARY MEDICAL DECISION MAKING FREE TEXT BOX
Pt presents with pelvic pain after fem catheterization. NO clinical sign of pseudoaneurysm. Abd is soft. Will obtain CT to eval RP bleed and UA to eval uti Pt presents with pelvic pain after fem catheterization. NO clinical sign of pseudoaneurysm. Abd is soft. Will obtain CT to eval RP bleed and UA to eval urinary tract infection    Gricelda Lyn DO: Patient signed out to me by Dr. Quiles. Labs and Imaging reviewed- no acute findings, no evidence of cellulitis on exam. patient is stable for dc. Gricelda Lyn DO

## 2023-05-27 NOTE — ED PROVIDER NOTE - IV ALTEPLASE INCLUSION HIDDEN
SPOKE/ W/ PT 'S DAUGHTER AND ADV'D U/S ORDERED  SHE WILL TRY AND JULIO CESAR FOR TOMORROW  WILL SEE IF SHE CAN GET PT  THERE-PT  IS IN A LOT OF PAIN-HARD TO GET AROUND  IF SHE CAN'T GET HER THERE MIGHT HAVE TO TAKE HER TO THE ER  show

## 2023-05-27 NOTE — ED PROVIDER NOTE - PROGRESS NOTE DETAILS
Patient signed out to me by Dr. Quiles. Imaging reviewed- no acute findings, no evidence of cellulitis on exam. patient is stable for dc. Gricelda Lyn DO

## 2023-05-27 NOTE — ED ADULT TRIAGE NOTE - CHIEF COMPLAINT QUOTE
pt c/o lower right abd pain, blood in urine pt had a cardiac surgery on wednesday pt c/o lower right abd pain, right groin area, blood in urine pt had a cardiac surgery PFO closure last Wednesday, denies any recent drugs and alcohol

## 2023-05-27 NOTE — ED PROVIDER NOTE - PATIENT PORTAL LINK FT
You can access the FollowMyHealth Patient Portal offered by [facility] by registering at the following website: [email or web address]. By joining [employer]’s FollowMyHealth portal, you will also be able to view your health information using other applications (apps) compatible with our system.

## 2023-05-28 NOTE — ED ADULT NURSE NOTE - NSFALLUNIVINTERV_ED_ALL_ED
Bed/Stretcher in lowest position, wheels locked, appropriate side rails in place/Call bell, personal items and telephone in reach/Instruct patient to call for assistance before getting out of bed/chair/stretcher/Non-slip footwear applied when patient is off stretcher/[place] to call system/Physically safe environment - no spills, clutter or unnecessary equipment/Purposeful proactive rounding/Room/bathroom lighting operational, light cord in reach

## 2023-05-28 NOTE — ED ADULT NURSE NOTE - CHIEF COMPLAINT QUOTE
pt c/o lower right abd pain, right groin area, blood in urine pt had a cardiac surgery PFO closure last Wednesday, denies any recent drugs and alcohol

## 2023-05-30 DIAGNOSIS — I63.9 CEREBRAL INFARCTION, UNSPECIFIED: ICD-10-CM

## 2023-06-02 ENCOUNTER — NON-APPOINTMENT (OUTPATIENT)
Age: 53
End: 2023-06-02

## 2023-06-02 ENCOUNTER — APPOINTMENT (OUTPATIENT)
Dept: CARDIOLOGY | Facility: CLINIC | Age: 53
End: 2023-06-02
Payer: MEDICAID

## 2023-06-02 VITALS
DIASTOLIC BLOOD PRESSURE: 84 MMHG | HEIGHT: 65 IN | WEIGHT: 224 LBS | BODY MASS INDEX: 37.32 KG/M2 | TEMPERATURE: 98.3 F | OXYGEN SATURATION: 99 % | SYSTOLIC BLOOD PRESSURE: 125 MMHG | HEART RATE: 88 BPM

## 2023-06-02 DIAGNOSIS — E78.5 HYPERLIPIDEMIA, UNSPECIFIED: ICD-10-CM

## 2023-06-02 DIAGNOSIS — R19.09 OTHER INTRA-ABDOMINAL AND PELVIC SWELLING, MASS AND LUMP: ICD-10-CM

## 2023-06-02 DIAGNOSIS — I10 ESSENTIAL (PRIMARY) HYPERTENSION: ICD-10-CM

## 2023-06-02 DIAGNOSIS — Z87.74 PERSONAL HISTORY OF (CORRECTED) CONGENITAL MALFORMATIONS OF HEART AND CIRCULATORY SYSTEM: ICD-10-CM

## 2023-06-02 PROCEDURE — 99214 OFFICE O/P EST MOD 30 MIN: CPT | Mod: 25

## 2023-06-02 PROCEDURE — 93000 ELECTROCARDIOGRAM COMPLETE: CPT

## 2023-06-09 ENCOUNTER — NON-APPOINTMENT (OUTPATIENT)
Age: 53
End: 2023-06-09

## 2023-06-09 PROBLEM — Z87.74 S/P PATENT FORAMEN OVALE CLOSURE: Status: ACTIVE | Noted: 2023-06-09

## 2023-06-09 PROBLEM — I10 HYPERTENSION: Status: ACTIVE | Noted: 2021-12-09

## 2023-06-09 PROBLEM — R19.09 GROIN LUMP: Status: ACTIVE | Noted: 2023-06-02

## 2023-06-09 PROBLEM — E78.5 HYPERLIPIDEMIA: Status: ACTIVE | Noted: 2021-12-09

## 2023-06-13 ENCOUNTER — APPOINTMENT (OUTPATIENT)
Dept: NEUROLOGY | Facility: CLINIC | Age: 53
End: 2023-06-13
Payer: MEDICAID

## 2023-06-13 VITALS
DIASTOLIC BLOOD PRESSURE: 80 MMHG | TEMPERATURE: 97.3 F | HEART RATE: 100 BPM | OXYGEN SATURATION: 98 % | WEIGHT: 224.1 LBS | BODY MASS INDEX: 37.34 KG/M2 | SYSTOLIC BLOOD PRESSURE: 117 MMHG | HEIGHT: 65 IN

## 2023-06-13 DIAGNOSIS — M54.17 RADICULOPATHY, LUMBOSACRAL REGION: ICD-10-CM

## 2023-06-13 DIAGNOSIS — G45.9 TRANSIENT CEREBRAL ISCHEMIC ATTACK, UNSPECIFIED: ICD-10-CM

## 2023-06-13 PROCEDURE — 99215 OFFICE O/P EST HI 40 MIN: CPT

## 2023-06-13 NOTE — DISCUSSION/SUMMARY
[FreeTextEntry1] : 53 year old woman with hypertension, chronic tobacco use, prior cocaine use and multiple evaluations for dizziness (no evidence of prior ischemic infarct or intracranial hemorrhage on any of her x4 MRI brains since 2020) found to have incidental small 2-3mm LICA aneurysm vs infundibulum and small 2-3mm LMCA bifurcation aneurysm and PFO now s/p PFO closure 5/24/2023. \par \par Evaluation at [facility] 3/16/2023 for dizziness, nausea and vomiting with cocaine positive urine drug test. MRI negative for stroke. \par \par CATY in 2020 - large ASD with bidirectional flow, evaluated by cardiology, now s/p PFO/ASD closure 5/24/2023.\par \par I counseled the patient about the natural history of cerebral aneurysms, the potential risks for fatal or disabling hemorrhage and medical and surgical treatments. I counseled to the patient the importance of smoking cessation in regards to the elevated risk of cerebral aneurysm rupture with continued tobacco and cocaine use. \par \par New left lower extremity EHL weakness and dorsum of left foot and left lateral leg numbness x2 weeks. May be possible L5 radiculopathy. History of reported herniated disk in the past. Recommend repeat MRI lumbar spine now with neurosurgical evaluation. \par \par PLAN:\par 1. Continue plavix as per cardiology for PFO/ASD closure. \par 2. aspirin 81mg daily \par 3. continue statin. \par 4. MRA head wo contrast in 3/2024 to follow up aneurysms. \par 5. Smoking and cocaine use cessation as counseled. \par 6. MRI lumbar spine wo contrast now. \par 7. Neurosurgical referral for likely lumbar radiculopathy. \par \par \par Patient was educated about signs and symptoms of stroke and was counseled to contact 911 upon emergence of strokelike symptoms. Intravenous thrombolysis and mechanical thrombectomy was also counseled and educated to the patient today.\par

## 2023-06-13 NOTE — HISTORY OF PRESENT ILLNESS
[FreeTextEntry1] :  services used today. \par \par Doing well. Rare headaches, not bad. \par \par She had her PFO closed and feels better. \par \par No dizziness. No falls. \par Ambulating well. \par \par She has developed new left foot numbness and pain on the dorsum of the left foot over the past 2 weeks. \par She has a history of severe back pain and reported herniated disk.\par \par She denies any severe back pain or significant issues walking currently. \par

## 2023-06-13 NOTE — PHYSICAL EXAM
[FreeTextEntry1] : GENERAL APPEARANCE:\par Well developed, well nourished woman in no acute distress.\par CARDIOVASCULAR:\par Regular rate and rhythm\par  \par NEUROLOGIC EXAM:\par MENTAL STATUS:\par Alert and Oriented to person, place and time.\par Speech is fluent, without aphasia or dysarthria\par Behavior and affect appropriate to situation.                        \par CRANIAL NERVES:\par CN 2:    Visual fields are full to confrontation.\par CN 3, 4, 6: Extraocular movements are intact. No nystagmus or ophthalmoplegia is evident. Pupils are equally round and reactive to light.\par CN 5:     Facial sensation is intact to light touch in all 3 divisions\par CN 7:     Facial excursion is full and symmetric bilaterally.\par MOTOR:\par RUE/RLE 5/5\par LUE 5/5\par LLE 5/5 hip flexion, knee extension, dorsiflexion. 4/5 EHL. \par No orbiting or drift noted.\par SENSORY:\par Decreased light touch and sharp perception in the left dorsum of the foot and the lateral aspect of the lower leg. \par COORD:\par Finger to nose testing without dysmetria bilaterally.\par GAIT:\par Normal station and gait. Romberg negative. \par \par

## 2023-06-22 ENCOUNTER — NON-APPOINTMENT (OUTPATIENT)
Age: 53
End: 2023-06-22

## 2023-06-22 ENCOUNTER — APPOINTMENT (OUTPATIENT)
Dept: ELECTROPHYSIOLOGY | Facility: CLINIC | Age: 53
End: 2023-06-22
Payer: MEDICAID

## 2023-06-22 PROCEDURE — G2066: CPT

## 2023-06-22 PROCEDURE — 93298 REM INTERROG DEV EVAL SCRMS: CPT

## 2023-07-14 ENCOUNTER — APPOINTMENT (OUTPATIENT)
Dept: CARDIOLOGY | Facility: CLINIC | Age: 53
End: 2023-07-14

## 2023-07-17 ENCOUNTER — APPOINTMENT (OUTPATIENT)
Dept: MRI IMAGING | Facility: CLINIC | Age: 53
End: 2023-07-17

## 2023-07-27 ENCOUNTER — NON-APPOINTMENT (OUTPATIENT)
Age: 53
End: 2023-07-27

## 2023-07-27 ENCOUNTER — APPOINTMENT (OUTPATIENT)
Dept: ELECTROPHYSIOLOGY | Facility: CLINIC | Age: 53
End: 2023-07-27
Payer: MEDICAID

## 2023-07-27 PROCEDURE — 93298 REM INTERROG DEV EVAL SCRMS: CPT

## 2023-07-27 PROCEDURE — G2066: CPT

## 2023-08-30 ENCOUNTER — NON-APPOINTMENT (OUTPATIENT)
Age: 53
End: 2023-08-30

## 2023-08-30 ENCOUNTER — APPOINTMENT (OUTPATIENT)
Dept: ELECTROPHYSIOLOGY | Facility: CLINIC | Age: 53
End: 2023-08-30
Payer: MEDICAID

## 2023-08-30 PROCEDURE — 93298 REM INTERROG DEV EVAL SCRMS: CPT

## 2023-08-30 PROCEDURE — G2066: CPT

## 2023-09-03 NOTE — DISCHARGE NOTE NURSING/CASE MANAGEMENT/SOCIAL WORK - NSDCPEFALRISK_GEN_ALL_CORE
Chief complaint:   Chief Complaint   Patient presents with   • Cough     Pt states she was here 6 days ago with the same symptoms of tightness in chest, wheezing, and coughing. Pt states with cough there is clear phlegm. No pain was reported. Pt was given Prednisone, Benzonatate, and Zithromax last visit but they did not help.       Vitals:  Visit Vitals  BP (!) 153/93   Pulse 62   Temp 99 °F (37.2 °C) (Temporal)   Resp 16   Ht 5' 1\" (1.549 m)   Wt 81.5 kg (179 lb 9.6 oz)   LMP 01/14/2010 (Exact Date)   SpO2 96%   BMI 33.94 kg/m²       HISTORY OF PRESENT ILLNESS     Cough  This is a new problem. The current episode started 1 to 4 weeks ago. Associated symptoms include nasal congestion and postnasal drip. Pertinent negatives include no ear pain, fever, rhinorrhea or shortness of breath. Associated symptoms comments: Finished Z-amberly and prednisone, denies it helping. No fever. .       Other significant problems:  Patient Active Problem List    Diagnosis Date Noted   • Asthma in adult without complication 01/10/2023     Priority: Low   • COVID-19 virus infection 12/02/2021     Priority: Low   • DESIRAE on CPAP 12/22/2020     Priority: Low   • Unformed visual hallucinations 12/15/2020     Priority: Low   • Irritable bowel syndrome with both constipation and diarrhea 10/27/2020     Priority: Low   • Gastroesophageal reflux disease 10/27/2020     Priority: Low   • Constipation 02/27/2020     Priority: Low   • Vitamin D deficiency 01/06/2020     Priority: Low   • Type 2 diabetes with complication (CMD) 01/06/2020     Priority: Low   • Stage 3 chronic kidney disease (CMD) 01/06/2020     Priority: Low   • Class 1 obesity 01/06/2020     Priority: Low   • Benign neoplasm of left adrenal gland 02/20/2019     Priority: Low   • Multinodular goiter 10/03/2018     Priority: Low   • Recurrent seizures (CMD) 09/23/2018     Priority: Low     EEG on 5/23/2018 was normal for awake drowsy and sleep states.  EEG on 1/15/2018 This is a normal  EEG for the awake only state.   No epileptiform activity is seen.  MRI of the brain 6/2/18   Impression   1. No evidence of acute infarction  2. Stable appearance of susceptibility artifact near genu of internal capsule on the left consistent with sequela of prior hematoma with residual hemosiderin deposition, unchanged.  3. Right parietal sequela of chronic infarction/laminar necrosis, unchanged in size.  4. Minimal changes of chronic small vessel ischemic disease  5. No new bleed or mass.       1/14/18: MR angiogram of the head and neck  IMPRESSION:   1. Right middle cerebral artery occlusion. This is consistent with the  patient's history of remote right MCA territory infarct.     IMPRESSION:   1.  Normal extracranial carotid and vertebral MRA, no evidence of  high-grade stenosis, dissection, or occlusion.     • Mild intermittent asthma without complication 07/10/2018     Priority: Low   • History of TIA (transient ischemic attack) and stroke 03/14/2018     Priority: Low     history of hypertension, fairly controlled diabetes, hyperlipidemia, right parietal hemorrhagic stroke in 2016.     • Anxiety disorder due to medical condition 03/14/2018     Priority: Low   • Hypertension 01/24/2018     Priority: Low   • Breast mass, right 04/09/2015     Priority: Low   • Hyperlipidemia with target low density lipoprotein (LDL) cholesterol less than 100 mg/dL 04/09/2015     Priority: Low       PAST MEDICAL, FAMILY AND SOCIAL HISTORY     Medications:  Current Outpatient Medications   Medication Sig Dispense Refill   • doxycycline hyclate (VIBRA-TABS) 100 MG tablet Take 1 tablet by mouth in the morning and 1 tablet in the evening. Do all this for 10 days. 20 tablet 0   • fluticasone (FLONASE) 50 MCG/ACT nasal spray Spray 2 sprays in each nostril daily. 16 g 1   • cloNIDine (CATAPRES) 0.3 MG tablet Take 1 tablet by mouth in the morning and 1 tablet at noon and 1 tablet in the evening. 270 tablet 1   • predniSONE (DELTASONE) 20  MG tablet Take 3 tablets by mouth daily for 2 days, THEN 2 tablets daily for 2 days, THEN 1 tablet daily for 2 days. 12 tablet 0   • azithromycin (ZITHROMAX) 250 MG tablet Take 2 tablets by mouth daily for 1 day, THEN 1 tablet daily for 4 days. 6 tablet 0   • benzonatate (TESSALON PERLES) 200 MG capsule Take 1 capsule by mouth 3 times daily as needed for Cough. 20 capsule 0   • Dexlansoprazole 60 MG capsule TAKE 1 CAPSULE BY MOUTH DAILY 90 capsule 2   • Finerenone (Kerendia) 10 MG Tab Take 1 tablet by mouth daily. PLEASE SCHEDULE APPOINTMENT FOR FURTHER REFILLS 90 tablet 0   • clopidogrel (PLAVIX) 75 MG tablet TAKE 1 TABLET BY MOUTH DAILY 90 tablet 1   • escitalopram (LEXAPRO) 20 MG tablet TAKE 1 TABLET BY MOUTH DAILY 30 tablet 1   • docusate sodium (COLACE) 100 MG capsule Take 1 capsule by mouth nightly. 90 capsule 1   • busPIRone (BUSPAR) 10 MG tablet Take 1 tablet by mouth in the morning and 1 tablet in the evening. 60 tablet 5   • famotidine (PEPCID) 20 MG tablet Take 1 tablet by mouth at bedtime. 30 tablet 5   • amitriptyline (ELAVIL) 10 MG tablet Take 1 tablet by mouth nightly. 30 tablet 5   • HumaLOG KwikPen 100 UNIT/ML pen-injector TAKE 10 UNITS BEFORE MEALS THREE TIMES DAILY. IF PRE-MEAL BLOOD SUGAR ABOVE 200 TAKE 12 UNITS. MAX DAILY DOSE 45 UNITS 45 mL 0   • insulin glargine (Lantus SoloStar) 100 UNIT/ML pen-injector Inject 35 Units into the skin nightly. Plus 2 units to prime every day. 45 mL 0   • levetiracetam (KEPPRA) 1000 MG tablet Take 1 tablet by mouth in the morning and 1 tablet in the evening. 180 tablet 3   • montelukast (SINGULAIR) 10 MG tablet TAKE 1 TABLET BY MOUTH EVERY NIGHT 30 tablet 11   • Semaglutide,0.25 or 0.5MG/DOS, (Ozempic, 0.25 or 0.5 MG/DOSE,) 2 MG/3ML Solution Pen-injector Inject 0.25 mg into the skin 1 day a week. 9 mL 0   • atorvastatin (LIPITOR) 20 MG tablet Take 1 tablet by mouth daily. 90 tablet 1   • carvedilol (COREG) 25 MG tablet TAKE 1 TABLET BY MOUTH TWICE DAILY WITH  MEALS 180 tablet 1   • losartan (COZAAR) 100 MG tablet TAKE 1 TABLET BY MOUTH DAILY 90 tablet 1   • CANE ADJUST/FIXED WITH TIP  1 each 0   • BD Pen Needle Mery 2nd Gen 32G X 4 MM Misc AS DIRECTED FOUR TIMES DAILY 400 each 3   • calcium carbonate (Calcium 600) 600 MG Tab Take 2 tablets by mouth daily. 30 tablet 0   • acetaminophen (Tylenol 8 Hour) 650 MG CR tablet Take 1 tablet by mouth every 8 hours as needed for Pain. 90 tablet 0   • fluticasone-vilanterol (Breo Ellipta) 200-25 MCG/ACT inhaler Inhale 1 puff into the lungs daily. 60 each 11   • albuterol 108 (90 Base) MCG/ACT inhaler Inhale 2 puffs into the lungs every 4 hours as needed for Shortness of Breath or Wheezing. 3 each 2   • B Complex-Biotin-FA (Super B-50 B Complex) Cap Take 1 tablet by mouth daily. 90 capsule 3   • albuterol 108 (90 Base) MCG/ACT inhaler Inhale 2 puffs into the lungs every 4 hours as needed for Wheezing. 8.5 g 0   • ipratropium-albuterol (DUONEB) 0.5-2.5 (3) MG/3ML nebulizer solution Take 3 mLs by nebulization every 6 hours as needed for Wheezing. 280 mL 11   • Continuous Blood Gluc Sensor (Dexcom G6 Sensor) Misc Insert new sensor every 10 days. 1 each 0   • ipratropium (ATROVENT) 0.03 % nasal spray 1-2 spray per nostril 1 to 3 times a day for runny nose, post nasal drip, or throat clearing 30 mL 11   • fluticasone (FLONASE) 50 MCG/ACT nasal spray 1 spray in each nostril 1-2 times a day as needed for nasal congestion, runny nose, post nasal drip, sinus pressure, or fullness sensation in ears 16 g 11   • Lancets (OneTouch Delica Plus [id number]) Misc TEST TWICE DAILY 200 each 3   • blood glucose test strip Patient to test twice daily. Dx: E11.65 Meter: per insurance coverage or patient provided name (Freestyle Lite requested) 200 each 3   • Spacer/Aero-Holding Chambers Device Use with inhalers, hand wash with warm water and mild dish soap 1 each 3   • Cholecalciferol 125 mcg (5,000 units) capsule Take 1 capsule by mouth daily. 180 capsule  3   • ferrous sulfate 325 (65 FE) MG tablet Take 1 tablet by mouth daily (with breakfast). 30 tablet 5   • Spacer/Aero Chamber Mouthpiece Misc Use with inhaler, rinse after use 1 each 4   • Ascorbic Acid (VITAMIN C PO) Take 1 tablet by mouth daily.       No current facility-administered medications for this visit.       Allergies:  ALLERGIES:   Allergen Reactions   • Latex   (Environmental) PRURITUS   • Seasonal HEADACHES   • Sulfa Antibiotics PRURITUS   • Sulfasalazine RASH       Past Medical  History/Surgeries:  Past Medical History:   Diagnosis Date   • Acute pain of left shoulder 11/13/2018   • Anxiety disorder due to medical condition 3/14/2018   • Asthma    • Benign neoplasm of left adrenal gland 2/20/2019   • Chronic constipation    • Essential (primary) hypertension    • Gastroesophageal reflux disease    • Head ache    • Hypercholesteremia    • DESIRAE on CPAP 12/22/2020   • Seizure (CMD)    • Seizure disorder as sequela of cerebrovascular accident (CMD) 11/24/2016    discharged by neurology   • Sleep apnea    • Stroke (CMD) 11/24/2016    intracranial hemorrhage   • Thyroid nodule 10/3/2018   • Type 2 diabetes mellitus with stage 3 chronic kidney disease, with long-term current use of insulin (CMD) 1998    insulin started 2017       Past Surgical History:   Procedure Laterality Date   • Bunionectomy Left 01/05/2018    Dr. Fallon   • Carpal tunnel release Bilateral    • Colonoscopy     • Colonoscopy  09/11/2020   • Egd     • Egd N/A 03/10/2021    normal EGD and biopsies Dr Adenike Davila   • Eye surgery      [place] cataracts   • Ir venogram adrenal selective bilat s&i Bilateral 01/08/2019   • Laparoscopy adrenalectomy Left 03/07/2019    normal adrenal gland, no pathology       Family History:  Family History   Problem Relation Age of Onset   • Diabetes Mother    • Heart disease Mother    • Kidney disease Mother    • Hypertension Mother    • Myocardial Infarction Mother    • Diabetes Sister    • Hypertension Sister    •  Diabetes Brother    • Hypertension Brother    • Diabetes Sister    • Asthma Son    • Allergic Rhinitis Neg Hx    • Eczema Neg Hx    • Urticaria Neg Hx        Social History:  Social History     Tobacco Use   • Smoking status: Never     Passive exposure: Never   • Smokeless tobacco: Never   Substance Use Topics   • Alcohol use: No     Comment: last drink in 3 years       REVIEW OF SYSTEMS     Review of Systems   Constitutional: Negative for activity change, appetite change and fever.   HENT: Positive for congestion and postnasal drip. Negative for ear pain, rhinorrhea, sinus pressure and sinus pain.    Respiratory: Positive for cough. Negative for chest tightness and shortness of breath.        PHYSICAL EXAM     Physical Exam  HENT:      Neck: Normal range of motion.   Eyes:      Extraocular Movements: Extraocular movements intact.      Pupils: Pupils are equal, round, and reactive to light.   Cardiovascular:      Rate and Rhythm: Normal rate.      Pulses: Normal pulses.      Heart sounds: Normal heart sounds.   Pulmonary:      Effort: Pulmonary effort is normal.      Breath sounds: Normal breath sounds. No wheezing.   Musculoskeletal:         General: Normal range of motion.   Neurological:      Mental Status: She is alert and oriented to person, place, and time.         ASSESSMENT/PLAN     Diagnoses and all orders for this visit:  Cough, unspecified type  -     doxycycline hyclate (VIBRA-TABS) 100 MG tablet; Take 1 tablet by mouth in the morning and 1 tablet in the evening. Do all this for 10 days.  -     fluticasone (FLONASE) 50 MCG/ACT nasal spray; Spray 2 sprays in each nostril daily.       For information on Fall & Injury Prevention, visit: [email or web address] OR  [email or web address] OR  https://www.cdc.gov/steadi/patient.html

## 2023-09-21 ENCOUNTER — APPOINTMENT (OUTPATIENT)
Dept: CARDIOLOGY | Facility: CLINIC | Age: 53
End: 2023-09-21

## 2023-10-04 ENCOUNTER — NON-APPOINTMENT (OUTPATIENT)
Age: 53
End: 2023-10-04

## 2023-10-04 ENCOUNTER — APPOINTMENT (OUTPATIENT)
Dept: ELECTROPHYSIOLOGY | Facility: CLINIC | Age: 53
End: 2023-10-04
Payer: MEDICAID

## 2023-10-04 PROCEDURE — 93298 REM INTERROG DEV EVAL SCRMS: CPT

## 2023-10-04 PROCEDURE — G2066: CPT

## 2023-10-13 ENCOUNTER — APPOINTMENT (OUTPATIENT)
Dept: CARDIOLOGY | Facility: CLINIC | Age: 53
End: 2023-10-13
Payer: MEDICAID

## 2023-10-13 ENCOUNTER — NON-APPOINTMENT (OUTPATIENT)
Age: 53
End: 2023-10-13

## 2023-10-13 VITALS
DIASTOLIC BLOOD PRESSURE: 78 MMHG | WEIGHT: 217 LBS | BODY MASS INDEX: 36.15 KG/M2 | HEART RATE: 88 BPM | TEMPERATURE: 97.6 F | SYSTOLIC BLOOD PRESSURE: 128 MMHG | OXYGEN SATURATION: 98 % | HEIGHT: 65 IN

## 2023-10-13 DIAGNOSIS — Z01.810 ENCOUNTER FOR PREPROCEDURAL CARDIOVASCULAR EXAMINATION: ICD-10-CM

## 2023-10-13 PROCEDURE — 99214 OFFICE O/P EST MOD 30 MIN: CPT | Mod: 25

## 2023-10-13 PROCEDURE — 93000 ELECTROCARDIOGRAM COMPLETE: CPT | Mod: NC

## 2023-10-13 RX ORDER — CLOPIDOGREL BISULFATE 75 MG/1
75 TABLET, FILM COATED ORAL
Qty: 30 | Refills: 3 | Status: DISCONTINUED | COMMUNITY
Start: 2021-12-09 | End: 2023-10-13

## 2023-10-19 PROBLEM — Z01.810 PREPROCEDURAL CARDIOVASCULAR EXAMINATION: Status: ACTIVE | Noted: 2023-10-19

## 2023-11-01 ENCOUNTER — EMERGENCY (EMERGENCY)
Facility: HOSPITAL | Age: 53
LOS: 1 days | Discharge: HOME | End: 2023-11-01
Attending: EMERGENCY MEDICINE
Payer: COMMERCIAL

## 2023-11-01 VITALS
HEART RATE: 77 BPM | TEMPERATURE: 98 F | RESPIRATION RATE: 20 BRPM | HEIGHT: 65 IN | SYSTOLIC BLOOD PRESSURE: 121 MMHG | DIASTOLIC BLOOD PRESSURE: 84 MMHG | WEIGHT: 222.67 LBS | OXYGEN SATURATION: 96 %

## 2023-11-01 VITALS
TEMPERATURE: 98 F | HEART RATE: 80 BPM | RESPIRATION RATE: 20 BRPM | DIASTOLIC BLOOD PRESSURE: 80 MMHG | SYSTOLIC BLOOD PRESSURE: 120 MMHG | OXYGEN SATURATION: 99 %

## 2023-11-01 DIAGNOSIS — Z98.891 HISTORY OF UTERINE SCAR FROM PREVIOUS SURGERY: Chronic | ICD-10-CM

## 2023-11-01 LAB
ALBUMIN SERPL ELPH-MCNC: 3.6 G/DL — SIGNIFICANT CHANGE UP (ref 3.3–5.2)
ALBUMIN SERPL ELPH-MCNC: 3.6 G/DL — SIGNIFICANT CHANGE UP (ref 3.3–5.2)
ALP SERPL-CCNC: 140 U/L — HIGH (ref 40–120)
ALP SERPL-CCNC: 140 U/L — HIGH (ref 40–120)
ALT FLD-CCNC: 18 U/L — SIGNIFICANT CHANGE UP
ALT FLD-CCNC: 18 U/L — SIGNIFICANT CHANGE UP
ANION GAP SERPL CALC-SCNC: 9 MMOL/L — SIGNIFICANT CHANGE UP (ref 5–17)
ANION GAP SERPL CALC-SCNC: 9 MMOL/L — SIGNIFICANT CHANGE UP (ref 5–17)
APTT BLD: 31.2 SEC — SIGNIFICANT CHANGE UP (ref 24.5–35.6)
APTT BLD: 31.2 SEC — SIGNIFICANT CHANGE UP (ref 24.5–35.6)
AST SERPL-CCNC: 15 U/L — SIGNIFICANT CHANGE UP
AST SERPL-CCNC: 15 U/L — SIGNIFICANT CHANGE UP
BASOPHILS # BLD AUTO: 0.05 K/UL — SIGNIFICANT CHANGE UP (ref 0–0.2)
BASOPHILS # BLD AUTO: 0.05 K/UL — SIGNIFICANT CHANGE UP (ref 0–0.2)
BASOPHILS NFR BLD AUTO: 0.6 % — SIGNIFICANT CHANGE UP (ref 0–2)
BASOPHILS NFR BLD AUTO: 0.6 % — SIGNIFICANT CHANGE UP (ref 0–2)
BILIRUB SERPL-MCNC: <0.2 MG/DL — LOW (ref 0.4–2)
BILIRUB SERPL-MCNC: <0.2 MG/DL — LOW (ref 0.4–2)
BUN SERPL-MCNC: 20 MG/DL — SIGNIFICANT CHANGE UP (ref 8–20)
BUN SERPL-MCNC: 20 MG/DL — SIGNIFICANT CHANGE UP (ref 8–20)
CALCIUM SERPL-MCNC: 8.7 MG/DL — SIGNIFICANT CHANGE UP (ref 8.4–10.5)
CALCIUM SERPL-MCNC: 8.7 MG/DL — SIGNIFICANT CHANGE UP (ref 8.4–10.5)
CHLORIDE SERPL-SCNC: 103 MMOL/L — SIGNIFICANT CHANGE UP (ref 96–108)
CHLORIDE SERPL-SCNC: 103 MMOL/L — SIGNIFICANT CHANGE UP (ref 96–108)
CO2 SERPL-SCNC: 28 MMOL/L — SIGNIFICANT CHANGE UP (ref 22–29)
CO2 SERPL-SCNC: 28 MMOL/L — SIGNIFICANT CHANGE UP (ref 22–29)
CREAT SERPL-MCNC: 0.58 MG/DL — SIGNIFICANT CHANGE UP (ref 0.5–1.3)
CREAT SERPL-MCNC: 0.58 MG/DL — SIGNIFICANT CHANGE UP (ref 0.5–1.3)
EGFR: 107 ML/MIN/1.73M2 — SIGNIFICANT CHANGE UP
EGFR: 107 ML/MIN/1.73M2 — SIGNIFICANT CHANGE UP
EOSINOPHIL # BLD AUTO: 0.3 K/UL — SIGNIFICANT CHANGE UP (ref 0–0.5)
EOSINOPHIL # BLD AUTO: 0.3 K/UL — SIGNIFICANT CHANGE UP (ref 0–0.5)
EOSINOPHIL NFR BLD AUTO: 3.5 % — SIGNIFICANT CHANGE UP (ref 0–6)
EOSINOPHIL NFR BLD AUTO: 3.5 % — SIGNIFICANT CHANGE UP (ref 0–6)
GLUCOSE SERPL-MCNC: 104 MG/DL — HIGH (ref 70–99)
GLUCOSE SERPL-MCNC: 104 MG/DL — HIGH (ref 70–99)
HCT VFR BLD CALC: 37.8 % — SIGNIFICANT CHANGE UP (ref 34.5–45)
HCT VFR BLD CALC: 37.8 % — SIGNIFICANT CHANGE UP (ref 34.5–45)
HGB BLD-MCNC: 12.2 G/DL — SIGNIFICANT CHANGE UP (ref 11.5–15.5)
HGB BLD-MCNC: 12.2 G/DL — SIGNIFICANT CHANGE UP (ref 11.5–15.5)
IMM GRANULOCYTES NFR BLD AUTO: 0.5 % — SIGNIFICANT CHANGE UP (ref 0–0.9)
IMM GRANULOCYTES NFR BLD AUTO: 0.5 % — SIGNIFICANT CHANGE UP (ref 0–0.9)
INR BLD: 1.02 RATIO — SIGNIFICANT CHANGE UP (ref 0.85–1.18)
INR BLD: 1.02 RATIO — SIGNIFICANT CHANGE UP (ref 0.85–1.18)
LYMPHOCYTES # BLD AUTO: 2.59 K/UL — SIGNIFICANT CHANGE UP (ref 1–3.3)
LYMPHOCYTES # BLD AUTO: 2.59 K/UL — SIGNIFICANT CHANGE UP (ref 1–3.3)
LYMPHOCYTES # BLD AUTO: 30.2 % — SIGNIFICANT CHANGE UP (ref 13–44)
LYMPHOCYTES # BLD AUTO: 30.2 % — SIGNIFICANT CHANGE UP (ref 13–44)
MCHC RBC-ENTMCNC: 29.3 PG — SIGNIFICANT CHANGE UP (ref 27–34)
MCHC RBC-ENTMCNC: 29.3 PG — SIGNIFICANT CHANGE UP (ref 27–34)
MCHC RBC-ENTMCNC: 32.3 GM/DL — SIGNIFICANT CHANGE UP (ref 32–36)
MCHC RBC-ENTMCNC: 32.3 GM/DL — SIGNIFICANT CHANGE UP (ref 32–36)
MCV RBC AUTO: 90.9 FL — SIGNIFICANT CHANGE UP (ref 80–100)
MCV RBC AUTO: 90.9 FL — SIGNIFICANT CHANGE UP (ref 80–100)
MONOCYTES # BLD AUTO: 0.89 K/UL — SIGNIFICANT CHANGE UP (ref 0–0.9)
MONOCYTES # BLD AUTO: 0.89 K/UL — SIGNIFICANT CHANGE UP (ref 0–0.9)
MONOCYTES NFR BLD AUTO: 10.4 % — SIGNIFICANT CHANGE UP (ref 2–14)
MONOCYTES NFR BLD AUTO: 10.4 % — SIGNIFICANT CHANGE UP (ref 2–14)
NEUTROPHILS # BLD AUTO: 4.71 K/UL — SIGNIFICANT CHANGE UP (ref 1.8–7.4)
NEUTROPHILS # BLD AUTO: 4.71 K/UL — SIGNIFICANT CHANGE UP (ref 1.8–7.4)
NEUTROPHILS NFR BLD AUTO: 54.8 % — SIGNIFICANT CHANGE UP (ref 43–77)
NEUTROPHILS NFR BLD AUTO: 54.8 % — SIGNIFICANT CHANGE UP (ref 43–77)
PLATELET # BLD AUTO: 271 K/UL — SIGNIFICANT CHANGE UP (ref 150–400)
PLATELET # BLD AUTO: 271 K/UL — SIGNIFICANT CHANGE UP (ref 150–400)
POTASSIUM SERPL-MCNC: 4.1 MMOL/L — SIGNIFICANT CHANGE UP (ref 3.5–5.3)
POTASSIUM SERPL-MCNC: 4.1 MMOL/L — SIGNIFICANT CHANGE UP (ref 3.5–5.3)
POTASSIUM SERPL-SCNC: 4.1 MMOL/L — SIGNIFICANT CHANGE UP (ref 3.5–5.3)
POTASSIUM SERPL-SCNC: 4.1 MMOL/L — SIGNIFICANT CHANGE UP (ref 3.5–5.3)
PROT SERPL-MCNC: 7.1 G/DL — SIGNIFICANT CHANGE UP (ref 6.6–8.7)
PROT SERPL-MCNC: 7.1 G/DL — SIGNIFICANT CHANGE UP (ref 6.6–8.7)
PROTHROM AB SERPL-ACNC: 11.3 SEC — SIGNIFICANT CHANGE UP (ref 9.5–13)
PROTHROM AB SERPL-ACNC: 11.3 SEC — SIGNIFICANT CHANGE UP (ref 9.5–13)
RBC # BLD: 4.16 M/UL — SIGNIFICANT CHANGE UP (ref 3.8–5.2)
RBC # BLD: 4.16 M/UL — SIGNIFICANT CHANGE UP (ref 3.8–5.2)
RBC # FLD: 12.5 % — SIGNIFICANT CHANGE UP (ref 10.3–14.5)
RBC # FLD: 12.5 % — SIGNIFICANT CHANGE UP (ref 10.3–14.5)
SODIUM SERPL-SCNC: 140 MMOL/L — SIGNIFICANT CHANGE UP (ref 135–145)
SODIUM SERPL-SCNC: 140 MMOL/L — SIGNIFICANT CHANGE UP (ref 135–145)
TSH SERPL-MCNC: 0.89 UIU/ML — SIGNIFICANT CHANGE UP (ref 0.27–4.2)
TSH SERPL-MCNC: 0.89 UIU/ML — SIGNIFICANT CHANGE UP (ref 0.27–4.2)
WBC # BLD: 8.58 K/UL — SIGNIFICANT CHANGE UP (ref 3.8–10.5)
WBC # BLD: 8.58 K/UL — SIGNIFICANT CHANGE UP (ref 3.8–10.5)
WBC # FLD AUTO: 8.58 K/UL — SIGNIFICANT CHANGE UP (ref 3.8–10.5)
WBC # FLD AUTO: 8.58 K/UL — SIGNIFICANT CHANGE UP (ref 3.8–10.5)

## 2023-11-01 PROCEDURE — 36415 COLL VENOUS BLD VENIPUNCTURE: CPT

## 2023-11-01 PROCEDURE — 70450 CT HEAD/BRAIN W/O DYE: CPT | Mod: 26,MG

## 2023-11-01 PROCEDURE — 96374 THER/PROPH/DIAG INJ IV PUSH: CPT

## 2023-11-01 PROCEDURE — 80053 COMPREHEN METABOLIC PANEL: CPT

## 2023-11-01 PROCEDURE — G1004: CPT

## 2023-11-01 PROCEDURE — 84443 ASSAY THYROID STIM HORMONE: CPT

## 2023-11-01 PROCEDURE — 85730 THROMBOPLASTIN TIME PARTIAL: CPT

## 2023-11-01 PROCEDURE — 70450 CT HEAD/BRAIN W/O DYE: CPT | Mod: MG

## 2023-11-01 PROCEDURE — 99284 EMERGENCY DEPT VISIT MOD MDM: CPT

## 2023-11-01 PROCEDURE — 99284 EMERGENCY DEPT VISIT MOD MDM: CPT | Mod: 25

## 2023-11-01 PROCEDURE — 85025 COMPLETE CBC W/AUTO DIFF WBC: CPT

## 2023-11-01 PROCEDURE — 85610 PROTHROMBIN TIME: CPT

## 2023-11-01 RX ORDER — ACETAMINOPHEN 500 MG
2 TABLET ORAL
Qty: 24 | Refills: 0
Start: 2023-11-01 | End: 2023-11-03

## 2023-11-01 RX ORDER — KETOROLAC TROMETHAMINE 30 MG/ML
10 SYRINGE (ML) INJECTION ONCE
Refills: 0 | Status: DISCONTINUED | OUTPATIENT
Start: 2023-11-01 | End: 2023-11-01

## 2023-11-01 RX ORDER — SODIUM CHLORIDE 9 MG/ML
1000 INJECTION INTRAMUSCULAR; INTRAVENOUS; SUBCUTANEOUS ONCE
Refills: 0 | Status: COMPLETED | OUTPATIENT
Start: 2023-11-01 | End: 2023-11-01

## 2023-11-01 RX ORDER — DIAZEPAM 5 MG
1 TABLET ORAL
Qty: 6 | Refills: 0
Start: 2023-11-01 | End: 2023-11-03

## 2023-11-01 RX ORDER — METOCLOPRAMIDE HCL 10 MG
10 TABLET ORAL ONCE
Refills: 0 | Status: COMPLETED | OUTPATIENT
Start: 2023-11-01 | End: 2023-11-01

## 2023-11-01 RX ADMIN — Medication 10 MILLIGRAM(S): at 20:51

## 2023-11-01 RX ADMIN — SODIUM CHLORIDE 1000 MILLILITER(S): 9 INJECTION INTRAMUSCULAR; INTRAVENOUS; SUBCUTANEOUS at 20:52

## 2023-11-01 RX ADMIN — Medication 10 MILLIGRAM(S): at 21:22

## 2023-11-01 NOTE — ED ADULT NURSE NOTE - OBJECTIVE STATEMENT
c/o HA and feeling "Drunk" and unable to coordinate on limbs. also c/o abd LLQ pain. pt stated she had blurred vision yesterday that has been resolved but still have black spots floating in her eyes. hx DM, HTN, CVA. AO4, NAD.

## 2023-11-01 NOTE — ED PROVIDER NOTE - OBJECTIVE STATEMENT
54 y/o 52 y/o female comes in c/o headache amd pressure in her eyes and feels unsteady in her gait not dizzy , also states that today had two episodes of BRBPR with BM painless   no n/v/d , no sensation of room spinning , no Upper respiratory symptoms, wears a hearing aid left ear  c/o photophobia , no neck stiffness, no fever

## 2023-11-01 NOTE — ED PROVIDER NOTE - CARE PROVIDER_API CALL
Shae Mas  Gastroenterology  [address], [address]  [place] [address]  Phone: [phone]  Fax: [phone]  Follow Up Time:     Avinash Langford  Neurology  [address], [address]  [place] [address]  Phone: [phone]  Fax: [phone]  Follow Up Time:

## 2023-11-01 NOTE — ED PROVIDER NOTE - CLINICAL SUMMARY MEDICAL DECISION MAKING FREE TEXT BOX
52 y/o female comes in c/o headache amd pressure in her eyes and feels unsteady in her gait not dizzy , also states that today had two episodes of BRBPR with BM painless   no n/v/d , no sensation of room spinning , no Upper respiratory symptoms, wears a hearing aid left ear  c/o photophobia , no neck stiffness, no fever    reeval :neuro nonfocal , abdomen soft nontender , symptoms resolved

## 2023-11-01 NOTE — ED ADULT NURSE NOTE - CHIEF COMPLAINT QUOTE
#[id number]. Pt A&Ox4, NAD. Pt presents to the ED with complaints of head pressure, dizziness x3 days. Pt also with complaints of rectal bleeding since this morning. Breathing even and unlabored.

## 2023-11-01 NOTE — ED ADULT NURSE NOTE - NSSUHOSCREENINGYN_ED_ALL_ED
Yes - the patient is able to be screened Itraconazole Counseling:  I discussed with the patient the risks of itraconazole including but not limited to liver damage, nausea/vomiting, neuropathy, and severe allergy.  The patient understands that this medication is best absorbed when taken with acidic beverages such as non-diet cola or ginger ale.  The patient understands that monitoring is required including baseline LFTs and repeat LFTs at intervals.  The patient understands that they are to contact us or the primary physician if concerning signs are noted.

## 2023-11-01 NOTE — ED PROVIDER NOTE - PATIENT PORTAL LINK FT
You can access the FollowMyHealth Patient Portal offered by [facility] by registering at the following website: [email or web address]. By joining [employer]’s FollowMyHealth portal, you will also be able to view your health information using other applications (apps) compatible with our system.

## 2023-11-01 NOTE — ED ADULT TRIAGE NOTE - CHIEF COMPLAINT QUOTE
#[id number]. Pt A&Ox4, NAD. Pt presents to the ED with complaints of head pressure, dizziness x3 days. Pt also with complaints of rectal bleeding since this morning. Breathing even and unlabored.

## 2023-11-01 NOTE — ED PROVIDER NOTE - CARE PROVIDERS DIRECT ADDRESSES
[email or web address]

## 2023-11-07 ENCOUNTER — APPOINTMENT (OUTPATIENT)
Dept: NEUROLOGY | Facility: CLINIC | Age: 53
End: 2023-11-07
Payer: MEDICAID

## 2023-11-07 VITALS
SYSTOLIC BLOOD PRESSURE: 127 MMHG | HEIGHT: 65 IN | WEIGHT: 212 LBS | BODY MASS INDEX: 35.32 KG/M2 | TEMPERATURE: 97.9 F | OXYGEN SATURATION: 98 % | HEART RATE: 88 BPM | DIASTOLIC BLOOD PRESSURE: 85 MMHG

## 2023-11-07 DIAGNOSIS — I67.1 CEREBRAL ANEURYSM, NONRUPTURED: ICD-10-CM

## 2023-11-07 DIAGNOSIS — R42 DIZZINESS AND GIDDINESS: ICD-10-CM

## 2023-11-07 PROCEDURE — 99215 OFFICE O/P EST HI 40 MIN: CPT

## 2023-11-07 RX ORDER — MECLIZINE HYDROCHLORIDE 25 MG/1
25 TABLET ORAL TWICE DAILY
Qty: 28 | Refills: 0 | Status: ACTIVE | COMMUNITY
Start: 2023-11-07

## 2023-11-07 RX ORDER — ASPIRIN ENTERIC COATED TABLETS 81 MG 81 MG/1
81 TABLET, DELAYED RELEASE ORAL
Refills: 0 | Status: ACTIVE | COMMUNITY
Start: 2023-11-07

## 2023-11-08 ENCOUNTER — NON-APPOINTMENT (OUTPATIENT)
Age: 53
End: 2023-11-08

## 2023-11-08 ENCOUNTER — APPOINTMENT (OUTPATIENT)
Dept: ELECTROPHYSIOLOGY | Facility: CLINIC | Age: 53
End: 2023-11-08
Payer: MEDICAID

## 2023-11-08 PROCEDURE — G2066: CPT | Mod: NC

## 2023-11-08 PROCEDURE — 93298 REM INTERROG DEV EVAL SCRMS: CPT | Mod: NC

## 2023-11-21 ENCOUNTER — APPOINTMENT (OUTPATIENT)
Dept: NEUROLOGY | Facility: CLINIC | Age: 53
End: 2023-11-21

## 2023-12-13 ENCOUNTER — APPOINTMENT (OUTPATIENT)
Dept: ELECTROPHYSIOLOGY | Facility: CLINIC | Age: 53
End: 2023-12-13
Payer: SELF-PAY

## 2023-12-13 ENCOUNTER — NON-APPOINTMENT (OUTPATIENT)
Age: 53
End: 2023-12-13

## 2023-12-13 PROCEDURE — 93298 REM INTERROG DEV EVAL SCRMS: CPT

## 2023-12-13 PROCEDURE — G2066: CPT

## 2024-01-17 ENCOUNTER — APPOINTMENT (OUTPATIENT)
Dept: ELECTROPHYSIOLOGY | Facility: CLINIC | Age: 54
End: 2024-01-17
Payer: SELF-PAY

## 2024-01-17 ENCOUNTER — NON-APPOINTMENT (OUTPATIENT)
Age: 54
End: 2024-01-17

## 2024-01-17 PROCEDURE — 93298 REM INTERROG DEV EVAL SCRMS: CPT

## 2024-02-06 NOTE — PATIENT PROFILE ADULT - NSPROPTRIGHTCAREGIVER_GEN_A_NUR
(209 lb)   SpO2 94%   BMI 29.15 kg/m²          has a past medical history of Arthritis, COPD (chronic obstructive pulmonary disease) ([facility]), DVT of deep femoral vein ([facility]), H/O echocardiogram Limited, H/O right and left heart catheterization, History of echocardiogram, Hx of Doppler echocardiogram, Hypertension, PAD (peripheral artery disease) ([facility]), Pneumonia, Seizures ([facility]), and Vertigo.   has a past surgical history that includes  section; Endoscopy, colon, diagnostic; Colonoscopy; Cardiac surgery; and Anomalous pulmonary venous return repair, total (2023).    Physical Exam  Eyes:      Extraocular Movements: Extraocular movements intact.   Cardiovascular:      Rate and Rhythm: Normal rate and regular rhythm.      Heart sounds: Murmur heard.   Pulmonary:      Effort: Pulmonary effort is normal.      Breath sounds: No rales.      Comments: Decreased breath sounds bilaterally  Musculoskeletal:      Right lower leg: No edema.      Left lower leg: No edema.   Skin:     General: Skin is warm.   Neurological:      Mental Status: She is oriented to person, place, and time. Mental status is at baseline.              Lab Data:  CBC:   No results for input(s): \"WBC\", \"HGB\", \"HCT\", \"MCV\", \"PLT\" in the last 72 hours.  BMP:   No results for input(s): \"NA\", \"K\", \"CL\", \"CO2\", \"PHOS\", \"BUN\", \"CREATININE\", \"CA\" in the last 72 hours.  LIVER PROFILE:   No results for input(s): \"AST\", \"ALT\", \"LIPASE\", \"AMYLASE\", \"ALB\", \"BILIDIR\", \"BILITOT\", \"ALKPHOS\" in the last 72 hours.  PT/INR:   No results for input(s): \"PROTIME\", \"INR\" in the last 72 hours.  APTT:   No results for input(s): \"APTT\" in the last 72 hours.  BNP:  No results for input(s): \"BNP\" in the last 72 hours.  TROPONIN: No results for input(s): \"TROPONINT\" in the last 72 hours.  Labs, consult, tests reviewed      Assessment/Plan:    Severe Aortic Stenosis s/p TAVR  Breast discomfort      EKG reviewed, showing normal sinus rhythm with right bundle branch block  declines

## 2024-02-08 NOTE — ED STATDOCS - NSTIMEPROVIDERCAREINITIATE_GEN_ER
No protocol for requested medication.    Medication: gabapentin 300 mg  Last office visit date: 10/18/23  Pharmacy: [facility] - [address]    Order pended, routed to clinician for review.    
PDMP reviewed; therapy appropriate, no aberrant behavior identified.  Approved and signed.    
25-Jun-2020 09:24

## 2024-02-21 ENCOUNTER — APPOINTMENT (OUTPATIENT)
Dept: ELECTROPHYSIOLOGY | Facility: CLINIC | Age: 54
End: 2024-02-21

## 2024-03-22 ENCOUNTER — APPOINTMENT (OUTPATIENT)
Dept: ELECTROPHYSIOLOGY | Facility: CLINIC | Age: 54
End: 2024-03-22

## 2024-04-21 ENCOUNTER — NON-APPOINTMENT (OUTPATIENT)
Age: 54
End: 2024-04-21

## 2024-04-22 ENCOUNTER — APPOINTMENT (OUTPATIENT)
Dept: ELECTROPHYSIOLOGY | Facility: CLINIC | Age: 54
End: 2024-04-22
Payer: SELF-PAY

## 2024-04-22 PROCEDURE — 93298 REM INTERROG DEV EVAL SCRMS: CPT

## 2024-05-22 ENCOUNTER — NON-APPOINTMENT (OUTPATIENT)
Age: 54
End: 2024-05-22

## 2024-05-23 ENCOUNTER — APPOINTMENT (OUTPATIENT)
Dept: ELECTROPHYSIOLOGY | Facility: CLINIC | Age: 54
End: 2024-05-23
Payer: SELF-PAY

## 2024-05-23 PROCEDURE — 93298 REM INTERROG DEV EVAL SCRMS: CPT

## 2024-06-25 ENCOUNTER — NON-APPOINTMENT (OUTPATIENT)
Age: 54
End: 2024-06-25

## 2024-06-26 ENCOUNTER — APPOINTMENT (OUTPATIENT)
Dept: ELECTROPHYSIOLOGY | Facility: CLINIC | Age: 54
End: 2024-06-26
Payer: SELF-PAY

## 2024-06-26 PROCEDURE — 93298 REM INTERROG DEV EVAL SCRMS: CPT

## 2024-07-14 NOTE — PATIENT PROFILE ADULT - HOW PATIENT ADDRESSED, PROFILE
Hospitalist Daily Progress Note  Name: Mateo Marsh  Age: 58 y.o.  Gender: male  CodeStatus: Full Code  Allergies: No Known Allergies    Chief Complaint:Sickle Cell Pain Crisis (Patient states pain all over from sickle cell .)      Primary Care Provider: Rey Pang MD    InpatientTreatment Team: Treatment Team: Attending Provider: Camilo Michael MD; Consulting Physician: Satya Sanchez MD; Consulting Physician: Hussein Strong MD; Consulting Physician: Denver Montes MD; Consulting Physician: Miles Baxter MD; Physician Assistant: Rj Michelle PA; Consulting Physician: Ruslan Almodovar MD; Consulting Physician: Silva Bain MD; Registered Nurse: Mariella Gilmore RN; : Fatuma Lyon RN    Admission Date: 7/9/2024      Subjective: Off precedex, alert, answers questions, gf at bedside.      Physical Exam  Vitals and nursing note reviewed.   Constitutional:       Appearance: Normal appearance.   Cardiovascular:      Rate and Rhythm: Normal rate and regular rhythm.   Pulmonary:      Effort: Pulmonary effort is normal.      Breath sounds: Normal breath sounds.   Abdominal:      General: Bowel sounds are normal.      Palpations: Abdomen is soft.   Musculoskeletal:         General: Normal range of motion.   Skin:     General: Skin is warm and dry.   Neurological:      Mental Status: He is alert and oriented to person, place, and time. Mental status is at baseline.         Medications:  Reviewed    Infusion Medications:    dextrose 125 mL/hr at 07/14/24 1702    niCARdipine Stopped (07/14/24 1007)    dexmedeTOMIDine (PRECEDEX) 1,000 mcg in sodium chloride 0.9 % 250 mL infusion Stopped (07/14/24 1400)    sodium chloride      sodium chloride      dextrose       Scheduled Medications:    amLODIPine  10 mg Oral Daily    valproate (DEPACON) 500 mg in sodium chloride 0.9 % 100 mL IVPB  500 mg IntraVENous 3 times per day    piperacillin-tazobactam  3,375 mg IntraVENous Q8H     Very dehydrated.  IV fluids and pain control.  Hematology consult.  7/11 - not likely contributing to current state, pain control as needed.  Altered mental status-patient is very confused and oriented only x 2.  He did not know how to swallow.  No other neurological symptoms.  Most likely due to severe dehydration.  Will consult neurology  7/11 - LP and MRI pending in am.  7/12 - LP reveals no bacterial infection, MRI pending.  Check RPR  7/13 - MRI shows chronic microvascular disease, non-acute.  Likely hypertensive encephalopathy is primary cause.  Cont nicardipine gtt.    7/14 - dramatically improved, off nicardipine, bp improved, home oral medications restarted.  Chest pain-history of CAD with recent stress test showing possible ischemia.  Plan for cath but patient did not follow-up.  Will consult cardiology.  Cycle troponin.  Repeat EKG.  7/11 - ct chest shows possible pneumonia however not consistent with patient history per girlfriend, hold additional abx until LP.  7/12 - starting abx today  Dehydration-very dehydrated.  Worsening his sickle cell symptoms.  Will continue IV fluids and monitor  Diabetes-insulin sliding scale.  Verify home medication  Hypertension-resume home medication.  Hydralazine as needed.     Code Status: Full  DVT prophylaxis: Lovenox    7/13 - Discussion with Scarlet Marsh, will hold transfer request x 24 hours for .  Discussed with other family as well who would like to hold transfer request.  Cont bp control.    7/14 - discussed with both daughters and girlfriend.  In agreement to cont treatment at [facility].    Electronically signed by ELISABETH JUAREZ MD on 7/14/2024 at 5:17 PM   Nieves

## 2024-07-30 ENCOUNTER — NON-APPOINTMENT (OUTPATIENT)
Age: 54
End: 2024-07-30

## 2024-07-31 ENCOUNTER — APPOINTMENT (OUTPATIENT)
Dept: ELECTROPHYSIOLOGY | Facility: CLINIC | Age: 54
End: 2024-07-31
Payer: SELF-PAY

## 2024-07-31 PROCEDURE — 93298 REM INTERROG DEV EVAL SCRMS: CPT

## 2024-09-03 ENCOUNTER — NON-APPOINTMENT (OUTPATIENT)
Age: 54
End: 2024-09-03

## 2024-09-04 ENCOUNTER — APPOINTMENT (OUTPATIENT)
Dept: ELECTROPHYSIOLOGY | Facility: CLINIC | Age: 54
End: 2024-09-04
Payer: SELF-PAY

## 2024-09-04 PROCEDURE — 93298 REM INTERROG DEV EVAL SCRMS: CPT

## 2024-10-09 ENCOUNTER — APPOINTMENT (OUTPATIENT)
Dept: ELECTROPHYSIOLOGY | Facility: CLINIC | Age: 54
End: 2024-10-09
Payer: SELF-PAY

## 2024-10-09 ENCOUNTER — NON-APPOINTMENT (OUTPATIENT)
Age: 54
End: 2024-10-09

## 2024-10-09 PROCEDURE — 93298 REM INTERROG DEV EVAL SCRMS: CPT

## 2024-11-06 ENCOUNTER — EMERGENCY (EMERGENCY)
Facility: HOSPITAL | Age: 54
LOS: 1 days | End: 2024-11-06
Attending: EMERGENCY MEDICINE
Payer: MEDICAID

## 2024-11-06 VITALS
HEART RATE: 86 BPM | SYSTOLIC BLOOD PRESSURE: 131 MMHG | DIASTOLIC BLOOD PRESSURE: 892 MMHG | RESPIRATION RATE: 18 BRPM | HEIGHT: 65 IN | OXYGEN SATURATION: 97 % | WEIGHT: 205.03 LBS | TEMPERATURE: 98 F

## 2024-11-06 VITALS
HEART RATE: 80 BPM | SYSTOLIC BLOOD PRESSURE: 152 MMHG | TEMPERATURE: 98 F | DIASTOLIC BLOOD PRESSURE: 92 MMHG | RESPIRATION RATE: 18 BRPM | OXYGEN SATURATION: 99 %

## 2024-11-06 DIAGNOSIS — Z98.891 HISTORY OF UTERINE SCAR FROM PREVIOUS SURGERY: Chronic | ICD-10-CM

## 2024-11-06 LAB
ALBUMIN SERPL ELPH-MCNC: 3.7 G/DL — SIGNIFICANT CHANGE UP (ref 3.3–5.2)
ALP SERPL-CCNC: 156 U/L — HIGH (ref 40–120)
ALT FLD-CCNC: 16 U/L — SIGNIFICANT CHANGE UP
ANION GAP SERPL CALC-SCNC: 10 MMOL/L — SIGNIFICANT CHANGE UP (ref 5–17)
AST SERPL-CCNC: 13 U/L — SIGNIFICANT CHANGE UP
BASOPHILS # BLD AUTO: 0.05 K/UL — SIGNIFICANT CHANGE UP (ref 0–0.2)
BASOPHILS NFR BLD AUTO: 0.5 % — SIGNIFICANT CHANGE UP (ref 0–2)
BILIRUB SERPL-MCNC: <0.2 MG/DL — LOW (ref 0.4–2)
BUN SERPL-MCNC: 14 MG/DL — SIGNIFICANT CHANGE UP (ref 8–20)
CALCIUM SERPL-MCNC: 8.6 MG/DL — SIGNIFICANT CHANGE UP (ref 8.4–10.5)
CHLORIDE SERPL-SCNC: 103 MMOL/L — SIGNIFICANT CHANGE UP (ref 96–108)
CO2 SERPL-SCNC: 26 MMOL/L — SIGNIFICANT CHANGE UP (ref 22–29)
CREAT SERPL-MCNC: 0.54 MG/DL — SIGNIFICANT CHANGE UP (ref 0.5–1.3)
D DIMER BLD IA.RAPID-MCNC: <150 NG/ML DDU — SIGNIFICANT CHANGE UP
EGFR: 109 ML/MIN/1.73M2 — SIGNIFICANT CHANGE UP
EOSINOPHIL # BLD AUTO: 0.15 K/UL — SIGNIFICANT CHANGE UP (ref 0–0.5)
EOSINOPHIL NFR BLD AUTO: 1.5 % — SIGNIFICANT CHANGE UP (ref 0–6)
GLUCOSE SERPL-MCNC: 160 MG/DL — HIGH (ref 70–99)
HCT VFR BLD CALC: 37.7 % — SIGNIFICANT CHANGE UP (ref 34.5–45)
HGB BLD-MCNC: 12 G/DL — SIGNIFICANT CHANGE UP (ref 11.5–15.5)
IMM GRANULOCYTES NFR BLD AUTO: 0.2 % — SIGNIFICANT CHANGE UP (ref 0–0.9)
LYMPHOCYTES # BLD AUTO: 2.18 K/UL — SIGNIFICANT CHANGE UP (ref 1–3.3)
LYMPHOCYTES # BLD AUTO: 22.2 % — SIGNIFICANT CHANGE UP (ref 13–44)
MCHC RBC-ENTMCNC: 28.8 PG — SIGNIFICANT CHANGE UP (ref 27–34)
MCHC RBC-ENTMCNC: 31.8 G/DL — LOW (ref 32–36)
MCV RBC AUTO: 90.6 FL — SIGNIFICANT CHANGE UP (ref 80–100)
MONOCYTES # BLD AUTO: 0.7 K/UL — SIGNIFICANT CHANGE UP (ref 0–0.9)
MONOCYTES NFR BLD AUTO: 7.1 % — SIGNIFICANT CHANGE UP (ref 2–14)
NEUTROPHILS # BLD AUTO: 6.72 K/UL — SIGNIFICANT CHANGE UP (ref 1.8–7.4)
NEUTROPHILS NFR BLD AUTO: 68.5 % — SIGNIFICANT CHANGE UP (ref 43–77)
PLATELET # BLD AUTO: 253 K/UL — SIGNIFICANT CHANGE UP (ref 150–400)
POTASSIUM SERPL-MCNC: 4.1 MMOL/L — SIGNIFICANT CHANGE UP (ref 3.5–5.3)
POTASSIUM SERPL-SCNC: 4.1 MMOL/L — SIGNIFICANT CHANGE UP (ref 3.5–5.3)
PROT SERPL-MCNC: 6.6 G/DL — SIGNIFICANT CHANGE UP (ref 6.6–8.7)
RBC # BLD: 4.16 M/UL — SIGNIFICANT CHANGE UP (ref 3.8–5.2)
RBC # FLD: 12.1 % — SIGNIFICANT CHANGE UP (ref 10.3–14.5)
SODIUM SERPL-SCNC: 139 MMOL/L — SIGNIFICANT CHANGE UP (ref 135–145)
TROPONIN T, HIGH SENSITIVITY RESULT: <6 NG/L — SIGNIFICANT CHANGE UP (ref 0–51)
WBC # BLD: 9.82 K/UL — SIGNIFICANT CHANGE UP (ref 3.8–10.5)
WBC # FLD AUTO: 9.82 K/UL — SIGNIFICANT CHANGE UP (ref 3.8–10.5)

## 2024-11-06 PROCEDURE — 85025 COMPLETE CBC W/AUTO DIFF WBC: CPT

## 2024-11-06 PROCEDURE — 71046 X-RAY EXAM CHEST 2 VIEWS: CPT

## 2024-11-06 PROCEDURE — 36415 COLL VENOUS BLD VENIPUNCTURE: CPT

## 2024-11-06 PROCEDURE — 99285 EMERGENCY DEPT VISIT HI MDM: CPT | Mod: 25

## 2024-11-06 PROCEDURE — 93010 ELECTROCARDIOGRAM REPORT: CPT

## 2024-11-06 PROCEDURE — 99285 EMERGENCY DEPT VISIT HI MDM: CPT

## 2024-11-06 PROCEDURE — 93005 ELECTROCARDIOGRAM TRACING: CPT

## 2024-11-06 PROCEDURE — 84484 ASSAY OF TROPONIN QUANT: CPT

## 2024-11-06 PROCEDURE — 85379 FIBRIN DEGRADATION QUANT: CPT

## 2024-11-06 PROCEDURE — T1013: CPT

## 2024-11-06 PROCEDURE — 71046 X-RAY EXAM CHEST 2 VIEWS: CPT | Mod: 26

## 2024-11-06 PROCEDURE — 80053 COMPREHEN METABOLIC PANEL: CPT

## 2024-11-06 NOTE — ED PROVIDER NOTE - CARE PROVIDER_API CALL
Depression    Diabetes mellitus, type 2
Karyna Cook  Cardiology  [address] [place] [address]  Phone: [phone]  Fax: [phone]  Follow Up Time: Urgent

## 2024-11-06 NOTE — ED ADULT TRIAGE NOTE - BSA (M2)
2 Likely 2nd to b/l pl effusions, atelectasis  -Suspect fluid overload given elevated proBNP, LE edema on admission   -Keep O>I with HD as tolerated   -Pt with hx of hydroPTX. CT chest in 2/2022 with pleural thickening, atelectasis. Findings at the time suspected to be chronic. CT A/P 4/2 with small b/l pl effusions L>R, pleural thickening  -CT chest now with small b/l pl effusion R>L, bibasilar atelectasis  -SOB resolved  -Keep sats >90% with supplemental O2 as needed (currently RA)  -CXR now with improved congestion

## 2024-11-06 NOTE — ED ADULT NURSE NOTE - OBJECTIVE STATEMENT
Pt is AOX4 c/o chest pain, back pain and jaw pain, L. arm heaviness since 04:00am. Denies N/V, SOB, headache, vision changes, fevers, chills. Pt states she has a hx of hypertension. Updated on the plan of care and verbalizes understanding.

## 2024-11-06 NOTE — ED PROVIDER NOTE - NSFOLLOWUPINSTRUCTIONS_ED_ALL_ED_FT
1. Follow up with cardiology within 2-3days for reevaluation.  2.  Return to the Emergency Department immediately for any worsening, progressive or concerning symptoms.     1. Fabi un seguimiento con cardiología dentro de 2 a 3 días para camille reevaluación.  2. Regrese al Departamento de Emergencias inmediatamente ante cualquier síntoma que empeore, sea progresivo o preocupante.    olor de pecho no específico en los adultos  Nonspecific Chest Pain, Adult  El dolor de pecho es camille sensación molesta, opresiva o dolorosa en el pecho. El dolor se siente christopher camille aplastamiento, torsión u opresión en el pecho. Camille persona puede sentir camille sensación de ardor u hormigueo. El dolor de pecho también se puede sentir en la espalda, el paul, la mandíbula, el hombro o el brazo. Celia dolor puede empeorar al moverse, estornudar o respirar profundamente.    El dolor de pecho puede deberse a camille afección potencialmente mortal. Se debe tratar de inmediato. También puede ser provocado por algo que no es potencialmente mortal. Si tiene dolor de pecho, puede ser difícil saber la diferencia, por lo tanto es importante que obtenga ayuda de inmediato para asegurarse de que no tiene camille afección grave.    Algunas causas potencialmente mortales del dolor de pecho incluyen:  Infarto de miocardio.  Un desgarro en el vaso sanguíneo principal del cuerpo (disección aórtica).  Inflamación alrededor del corazón (pericarditis).  Un problema en los pulmones, christopher un coágulo de milvia (embolia pulmonar) o un pulmón colapsado (neumotórax).  Algunas causas de dolor de pecho que no son potencialmente mortales incluyen:  Acidez estomacal.  Ansiedad o estrés.  Daño de los huesos, los músculos y los cartílagos que conforman la pared torácica.  Neumonía o bronquitis.  Culebrilla (virus de la varicela zóster).  El dolor de pecho puede aparecer y desaparecer. También puede ser shweta. Burnette médico le hará pruebas clínicas y otros estudios para tratar de determinar la causa del dolor. El tratamiento dependerá de la causa del dolor de pecho.    Siga estas instrucciones en burnette casa:  Medicamentos    Use los medicamentos de venta lupe y los recetados solamente christopher se lo haya indicado el médico.  Si le recetaron un antibiótico, tómelo christopher se lo haya indicado el médico. No deje de mary el antibiótico aunque comience a sentirse mejor.  Actividad    Evite las actividades que le causen dolor de pecho.  No levante ningún objeto que pese más de 10 libras (4,5 kg) o que supere el límite de peso que le hayan indicado, hasta que el médico le diga que puede hacerlo.  Fabi reposo christopher se lo haya indicado el médico.  Retome aurora actividades normales solo christopher se lo haya indicado el médico. Pregúntele al médico qué actividades son seguras para usted.  Estilo de jorge    A plate along with examples of foods in a healthy diet.  Silhouette of a person sitting on the floor doing yoga.  No consuma ningún producto que contenga nicotina o tabaco, christopher cigarrillos, cigarrillos electrónicos y tabaco de mascar. Si necesita ayuda para dejar de fumar, consulte al médico.  No gregory alcohol.  Opte por un estilo de jorge saludable christopher se lo hayan recomendado. Puede incluir:  Practicar actividad física con regularidad. Pídale al médico que le sugiera ejercicios que marija seguros para usted.  Seguir camille dieta cardiosaludable. Esta debe incluir muchas frutas y verduras frescas, cereales integrales, proteínas (magras) con bajo contenido de grasa y productos lácteos bajos en grasa. Un nutricionista podrá ayudarlo a hacer elecciones de alimentación saludables.  Mantener un peso saludable.  Controlar cualquier otra afección que tenga, christopher presión arterial lauren (hipertensión) o diabetes.  Disminuir el nivel de estrés; por ejemplo, con yoga o técnicas de relajación.  Instrucciones generales    Esté atento a cualquier cambio en los síntomas.  Es burnette responsabilidad obtener los resultados de cualquier prueba que se haya realizado. Consulte al médico o pregunte en el departamento donde se realizan las pruebas cuándo estarán listos los resultados.  Concurra a todas las visitas de seguimiento christopher se lo haya indicado el médico. Vidette es importante.  Es posible que le pidan que se someta a más pruebas si el dolor de pecho no desaparece.  Comuníquese con un médico si:  El dolor de pecho no desaparece.  Se siente deprimido.  Tiene fiebre.  Nota cambios en los síntomas o desarrolla síntomas nuevos.  Solicite ayuda de inmediato si:  El dolor en el pecho empeora.  Tiene tos que empeora o tose con milvia.  Siente un dolor intenso en el abdomen.  Se desmaya.  Tiene camille molestia repentina e inexplicable en el pecho.  Tiene molestias repentinas e inexplicables en los brazos, la espalda, el paul o la mandíbula.  Le falta el aire en cualquier momento.  Comienza a sudar de manera repentina o la piel se le humedece.  Siente náuseas o vomita.  Se siente repentinamente mareado o se desmaya.  Tiene debilidad intensa, o debilidad o fatiga sin explicación.  Siente que el corazón comienza a latir rápidamente o que se saltea latidos.  Estos síntomas pueden representar un problema grave que constituye camille emergencia. No espere a eris si los síntomas desaparecen. Solicite atención médica de inmediato. Comuníquese con el servicio de emergencias de burnette localidad (911 en los [place]). No conduzca por aurora propios medios hasta el hospital.    Resumen  El dolor de pecho puede ser provocado por camille afección que es grave y requiere tratamiento urgente. También puede ser provocado por algo que no es potencialmente mortal.  El médico puede hacerle pruebas clínicas y otros estudios para tratar de determinar la causa del dolor.  Siga las instrucciones de burnette médico sobre mary medicamentos, hacer cambios en burnette estilo de jorge y recibir tratamiento de urgencia si los síntomas empeoran.  Concurra a todas las visitas de seguimiento christopher se lo haya indicado el médico. Vidette incluye las visitas para realizarle otras pruebas si el dolor de pecho no desaparece.  Esta información no tiene christopher fin reemplazar el consejo del médico. Asegúrese de hacerle al médico cualquier pregunta que tenga.

## 2024-11-06 NOTE — ED ADULT NURSE NOTE - CHIEF COMPLAINT QUOTE
pt was woken up out of bed with left sided under breast pain radiating to the back, neck and left arm. hx of Htn, DM, and sees [facility]

## 2024-11-06 NOTE — ED ADULT TRIAGE NOTE - CHIEF COMPLAINT QUOTE
pt was woken up out of bed with left sided under breast pain radiating to the back, neck and left arm. hx of Htn, DM, and sees [facility]

## 2024-11-06 NOTE — ED PROVIDER NOTE - PHYSICAL EXAMINATION
Gen: NAD, AOx3  Head: NCAT  HEENT: oral mucosa moist, normal conjunctiva, oropharynx clear without exudate or erythema  Lung: CTAB, no respiratory distress, no wheezing, rales, rhonchi  CV: normal s1/s2, rrr, no murmurs, Normal perfusion, pulses 2+ throughout  Abd: soft, NTND  MSK: tender to palpation over left anterior chest wall, No edema, no visible deformities, full range of motion in all 4 extremities  Neuro: CN II-XII grossly intact, No focal neurologic deficits  Skin: No rash   Psych: normal affect

## 2024-11-06 NOTE — ED PROVIDER NOTE - PATIENT PORTAL LINK FT
You can access the FollowMyHealth Patient Portal offered by [facility] by registering at the following website: [email or web address]. By joining [employer]’s FollowMyHealth portal, you will also be able to view your health information using other applications (apps) compatible with our system.

## 2024-11-06 NOTE — ED ADULT NURSE NOTE - NSFALLUNIVINTERV_ED_ALL_ED
Bed/Stretcher in lowest position, wheels locked, appropriate side rails in place/Call bell, personal items and telephone in reach/Instruct patient to call for assistance before getting out of bed/chair/stretcher/Non-slip footwear applied when patient is off stretcher/[place] to call system/Physically safe environment - no spills, clutter or unnecessary equipment/Purposeful proactive rounding/Room/bathroom lighting operational, light cord in reach

## 2024-11-06 NOTE — ED PROVIDER NOTE - OBJECTIVE STATEMENT
53yo female with PMH [place], hypertension, hyperlipidemia presenting with left-sided chest pain that started this morning, feels sharp, worse with deep inspiration, radiating to neck.  No associated shortness of breath, diaphoresis, fevers or chills.  No history of CAD or stents.  Follows with cardiology as outpatient.

## 2024-11-06 NOTE — ED PROVIDER NOTE - CLINICAL SUMMARY MEDICAL DECISION MAKING FREE TEXT BOX
54-year-old female past medical history diabetes, hypertension, hyperlipidemia presenting with left-sided chest pain that is reproducible nature, no fevers or chills.  EKG was independently reviewed, normal sinus rhythm, no ST elevations or depressions, no signs of ischemia noted.  Chest x-ray was reviewed and was normal.  Labs reviewed, including a troponin of less than 6, which rules the patient out for acute coronary syndrome.  A D-dimer was negative, which rules the patient out for pulmonary embolism.  The patient was reassessed, feeling much better, requesting DC home.  Stable for DC home with cardiology follow-up

## 2024-11-13 ENCOUNTER — APPOINTMENT (OUTPATIENT)
Dept: ELECTROPHYSIOLOGY | Facility: CLINIC | Age: 54
End: 2024-11-13
Payer: SELF-PAY

## 2024-11-13 ENCOUNTER — NON-APPOINTMENT (OUTPATIENT)
Age: 54
End: 2024-11-13

## 2024-11-13 PROCEDURE — 93298 REM INTERROG DEV EVAL SCRMS: CPT

## 2024-12-18 ENCOUNTER — APPOINTMENT (OUTPATIENT)
Dept: ELECTROPHYSIOLOGY | Facility: CLINIC | Age: 54
End: 2024-12-18

## 2024-12-18 PROCEDURE — 93298 REM INTERROG DEV EVAL SCRMS: CPT

## 2024-12-25 PROBLEM — F10.90 ALCOHOL USE: Status: ACTIVE | Noted: 2021-12-09

## 2025-01-20 NOTE — ED PROVIDER NOTE - GENITOURINARY NEGATIVE STATEMENT, MLM
Received faxed Medical Clearance forms.    Patient is scheduled for 1/21/25 Pre-Op Appointment.     Forms place in Dr.. Mansfield's mailbox.    no dysuria, no frequency, and no hematuria.

## 2025-01-22 ENCOUNTER — APPOINTMENT (OUTPATIENT)
Dept: ELECTROPHYSIOLOGY | Facility: CLINIC | Age: 55
End: 2025-01-22

## 2025-01-22 PROCEDURE — 93298 REM INTERROG DEV EVAL SCRMS: CPT

## 2025-01-27 NOTE — PATIENT PROFILE ADULT - STATED REASON FOR ADMISSION
dizziness Saucerization Excision Additional Text (Leave Blank If You Do Not Want): The margin was drawn around the clinically apparent lesion.  Incisions were then made along these lines, in a tangential fashion, to the appropriate tissue plane and the lesion was extirpated.

## 2025-02-26 ENCOUNTER — APPOINTMENT (OUTPATIENT)
Dept: ELECTROPHYSIOLOGY | Facility: CLINIC | Age: 55
End: 2025-02-26

## 2025-02-26 PROCEDURE — 93298 REM INTERROG DEV EVAL SCRMS: CPT

## 2025-04-02 ENCOUNTER — NON-APPOINTMENT (OUTPATIENT)
Age: 55
End: 2025-04-02

## 2025-04-02 ENCOUNTER — APPOINTMENT (OUTPATIENT)
Dept: ELECTROPHYSIOLOGY | Facility: CLINIC | Age: 55
End: 2025-04-02

## 2025-04-02 PROCEDURE — 93298 REM INTERROG DEV EVAL SCRMS: CPT

## 2025-05-07 ENCOUNTER — APPOINTMENT (OUTPATIENT)
Dept: ELECTROPHYSIOLOGY | Facility: CLINIC | Age: 55
End: 2025-05-07
Payer: SELF-PAY

## 2025-05-07 ENCOUNTER — NON-APPOINTMENT (OUTPATIENT)
Age: 55
End: 2025-05-07

## 2025-05-07 PROCEDURE — 93298 REM INTERROG DEV EVAL SCRMS: CPT

## 2025-06-11 ENCOUNTER — NON-APPOINTMENT (OUTPATIENT)
Age: 55
End: 2025-06-11

## 2025-06-11 ENCOUNTER — APPOINTMENT (OUTPATIENT)
Dept: ELECTROPHYSIOLOGY | Facility: CLINIC | Age: 55
End: 2025-06-11
Payer: SELF-PAY

## 2025-06-11 PROCEDURE — 93298 REM INTERROG DEV EVAL SCRMS: CPT

## 2025-06-25 ENCOUNTER — EMERGENCY (EMERGENCY)
Facility: HOSPITAL | Age: 55
LOS: 1 days | End: 2025-06-25
Attending: EMERGENCY MEDICINE
Payer: MEDICAID

## 2025-06-25 VITALS
SYSTOLIC BLOOD PRESSURE: 135 MMHG | DIASTOLIC BLOOD PRESSURE: 84 MMHG | TEMPERATURE: 99 F | HEART RATE: 97 BPM | WEIGHT: 210.1 LBS | RESPIRATION RATE: 20 BRPM | OXYGEN SATURATION: 96 %

## 2025-06-25 VITALS
OXYGEN SATURATION: 98 % | HEART RATE: 89 BPM | TEMPERATURE: 98 F | SYSTOLIC BLOOD PRESSURE: 129 MMHG | RESPIRATION RATE: 18 BRPM | DIASTOLIC BLOOD PRESSURE: 89 MMHG

## 2025-06-25 DIAGNOSIS — Z98.891 HISTORY OF UTERINE SCAR FROM PREVIOUS SURGERY: Chronic | ICD-10-CM

## 2025-06-25 PROCEDURE — 73030 X-RAY EXAM OF SHOULDER: CPT

## 2025-06-25 PROCEDURE — 73060 X-RAY EXAM OF HUMERUS: CPT

## 2025-06-25 PROCEDURE — 73060 X-RAY EXAM OF HUMERUS: CPT | Mod: 26,RT

## 2025-06-25 PROCEDURE — 72100 X-RAY EXAM L-S SPINE 2/3 VWS: CPT

## 2025-06-25 PROCEDURE — 73090 X-RAY EXAM OF FOREARM: CPT

## 2025-06-25 PROCEDURE — 96372 THER/PROPH/DIAG INJ SC/IM: CPT

## 2025-06-25 PROCEDURE — T1013: CPT

## 2025-06-25 PROCEDURE — 73090 X-RAY EXAM OF FOREARM: CPT | Mod: 26,LT

## 2025-06-25 PROCEDURE — 73030 X-RAY EXAM OF SHOULDER: CPT | Mod: 26,RT

## 2025-06-25 PROCEDURE — 99284 EMERGENCY DEPT VISIT MOD MDM: CPT

## 2025-06-25 PROCEDURE — 73130 X-RAY EXAM OF HAND: CPT | Mod: 26,RT

## 2025-06-25 PROCEDURE — 73130 X-RAY EXAM OF HAND: CPT

## 2025-06-25 PROCEDURE — 72100 X-RAY EXAM L-S SPINE 2/3 VWS: CPT | Mod: 26

## 2025-06-25 PROCEDURE — 99284 EMERGENCY DEPT VISIT MOD MDM: CPT | Mod: 25

## 2025-06-25 RX ORDER — DIAZEPAM 5 MG/1
2 TABLET ORAL ONCE
Refills: 0 | Status: DISCONTINUED | OUTPATIENT
Start: 2025-06-25 | End: 2025-06-25

## 2025-06-25 RX ORDER — KETOROLAC TROMETHAMINE 30 MG/ML
30 INJECTION, SOLUTION INTRAMUSCULAR; INTRAVENOUS ONCE
Refills: 0 | Status: DISCONTINUED | OUTPATIENT
Start: 2025-06-25 | End: 2025-06-25

## 2025-06-25 RX ORDER — IBUPROFEN 200 MG
1 TABLET ORAL
Qty: 20 | Refills: 0
Start: 2025-06-25 | End: 2025-06-29

## 2025-06-25 RX ORDER — ONDANSETRON HCL/PF 4 MG/2 ML
4 VIAL (ML) INJECTION ONCE
Refills: 0 | Status: COMPLETED | OUTPATIENT
Start: 2025-06-25 | End: 2025-06-25

## 2025-06-25 RX ADMIN — Medication 4 MILLIGRAM(S): at 12:16

## 2025-06-25 RX ADMIN — DIAZEPAM 2 MILLIGRAM(S): 5 TABLET ORAL at 12:16

## 2025-06-25 RX ADMIN — KETOROLAC TROMETHAMINE 30 MILLIGRAM(S): 30 INJECTION, SOLUTION INTRAMUSCULAR; INTRAVENOUS at 12:16

## 2025-06-25 NOTE — ED PROVIDER NOTE - PATIENT PORTAL LINK FT
You can access the FollowMyHealth Patient Portal offered by [facility] by registering at the following website: [email or web address]. By joining [employer]’s FollowMyHealth portal, you will also be able to view your health information using other applications (apps) compatible with our system.

## 2025-06-25 NOTE — ED PROVIDER NOTE - PHYSICAL EXAMINATION
General: well appearing, NAD appears upset  Head:  NC, AT  Eyes: EOMI, no scleral icterus  Ears: no erythema/drainage  Nose: midline, no bleeding/drainage  Throat: MMM  Cardiac: RRR, no apparent murmurs, no lower extremity edema  Respiratory: CTABL, equal chest wall expansions  Abdomen: soft, ND, NT, no rebound, no guarding, nonperitonitic  MSK/Vascular: full ROM, soft compartments, warm extremities R: shoulder , humerus, elbow, FA, hand +ttp to light touch from nvi distal p 2 +  Spine:No midline c spine t spine ttp or step off or deformity. + midline lumbar ttp no step off or deformity any spinal area  Neuro: AAOx3, sensation to light touch intact, finger to nose coordination intact, cranial nerves 2-12 intact  Psych: calm, cooperative, normal affect General: well appearing, NAD appears upset  Head:  NC, AT  Eyes: EOMI, no scleral icterus  Ears: no erythema/drainage  Nose: midline, no bleeding/drainage  Throat: MMM  Cardiac: RRR, no apparent murmurs, no lower extremity edema  Respiratory: CTABL, equal chest wall expansions  Abdomen: soft, ND, NT, no rebound, no guarding, nonperitonitic  MSK/Vascular: full ROM, soft compartments, warm extremities R: shoulder , humerus, elbow, FA, hand +ttp to light touch from nvi distal p 2 + Also has paravert msk ttp T and LS and C spinal regions  Spine:No midline c spine t spine ttp or step off or deformity. + midline lumbar ttp no step off or deformity any spinal area  Neuro: AAOx3, sensation to light touch intact, finger to nose coordination intact, cranial nerves 2-12 intact  Psych: calm, cooperative, normal affect
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
## 2025-06-25 NOTE — ED PROVIDER NOTE - NS ED ROS FT
Constitutional: no fever, no chills  Eyes: no pain, no redness, no visual changes.  ENMT: no ear pain, no hearing problems, no nasal congestion/drainage, no throat pain, no neck pain/stiffness  CV: no chest pain, no edema  Resp: no cough, no dyspnea  GI: no abdominal pain, no bloating, no constipation, no diarrhea, + nausea, no vomiting.  : no dysuria, no hematuria  MSK: + msk pain, no weakness  Skin: no lesions, and no rashes   Neuro: no LOC, no headache, no sensory deficits, and no weakness + dizzy

## 2025-06-25 NOTE — ED PROVIDER NOTE - NSFOLLOWUPINSTRUCTIONS_ED_ALL_ED_FT
No tienes fracturas. Estarás rígido y dolorido kiki unos días. Brenda ibuprofeno para el dolor, 1 píldora cada 6 horas. Haz un seguimiento con tu médico.
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
## 2025-07-15 ENCOUNTER — APPOINTMENT (OUTPATIENT)
Dept: ELECTROPHYSIOLOGY | Facility: CLINIC | Age: 55
End: 2025-07-15
Payer: SELF-PAY

## 2025-07-15 ENCOUNTER — NON-APPOINTMENT (OUTPATIENT)
Age: 55
End: 2025-07-15

## 2025-07-15 PROCEDURE — 93298 REM INTERROG DEV EVAL SCRMS: CPT

## 2025-08-19 ENCOUNTER — APPOINTMENT (OUTPATIENT)
Dept: ELECTROPHYSIOLOGY | Facility: CLINIC | Age: 55
End: 2025-08-19
Payer: SELF-PAY

## 2025-08-19 ENCOUNTER — NON-APPOINTMENT (OUTPATIENT)
Age: 55
End: 2025-08-19

## 2025-08-19 PROCEDURE — 93298 REM INTERROG DEV EVAL SCRMS: CPT
